# Patient Record
Sex: MALE | Race: BLACK OR AFRICAN AMERICAN | NOT HISPANIC OR LATINO | Employment: OTHER | URBAN - METROPOLITAN AREA
[De-identification: names, ages, dates, MRNs, and addresses within clinical notes are randomized per-mention and may not be internally consistent; named-entity substitution may affect disease eponyms.]

---

## 2017-04-07 ENCOUNTER — LAB VISIT (OUTPATIENT)
Dept: LAB | Facility: HOSPITAL | Age: 67
End: 2017-04-07
Attending: INTERNAL MEDICINE
Payer: COMMERCIAL

## 2017-04-07 DIAGNOSIS — D47.2 MGUS (MONOCLONAL GAMMOPATHY OF UNKNOWN SIGNIFICANCE): ICD-10-CM

## 2017-04-07 LAB
ALBUMIN SERPL BCP-MCNC: 4.1 G/DL
ALP SERPL-CCNC: 80 U/L
ALT SERPL W/O P-5'-P-CCNC: 30 U/L
ANION GAP SERPL CALC-SCNC: 7 MMOL/L
AST SERPL-CCNC: 32 U/L
BASOPHILS # BLD AUTO: 0.01 K/UL
BASOPHILS NFR BLD: 0.4 %
BILIRUB SERPL-MCNC: 0.9 MG/DL
BUN SERPL-MCNC: 17 MG/DL
CALCIUM SERPL-MCNC: 9.6 MG/DL
CHLORIDE SERPL-SCNC: 107 MMOL/L
CO2 SERPL-SCNC: 28 MMOL/L
CREAT SERPL-MCNC: 1.6 MG/DL
DIFFERENTIAL METHOD: ABNORMAL
EOSINOPHIL # BLD AUTO: 0.2 K/UL
EOSINOPHIL NFR BLD: 6.2 %
ERYTHROCYTE [DISTWIDTH] IN BLOOD BY AUTOMATED COUNT: 13.8 %
EST. GFR  (AFRICAN AMERICAN): 51.9 ML/MIN/1.73 M^2
EST. GFR  (NON AFRICAN AMERICAN): 44.9 ML/MIN/1.73 M^2
GLUCOSE SERPL-MCNC: 83 MG/DL
HCT VFR BLD AUTO: 42.6 %
HGB BLD-MCNC: 14.8 G/DL
LYMPHOCYTES # BLD AUTO: 0.8 K/UL
LYMPHOCYTES NFR BLD: 34.7 %
MCH RBC QN AUTO: 29.9 PG
MCHC RBC AUTO-ENTMCNC: 34.7 %
MCV RBC AUTO: 86 FL
MONOCYTES # BLD AUTO: 0.2 K/UL
MONOCYTES NFR BLD: 7.9 %
NEUTROPHILS # BLD AUTO: 1.2 K/UL
NEUTROPHILS NFR BLD: 50.8 %
PLATELET # BLD AUTO: 125 K/UL
PMV BLD AUTO: 10.1 FL
POTASSIUM SERPL-SCNC: 3.4 MMOL/L
PROT SERPL-MCNC: 8.2 G/DL
RBC # BLD AUTO: 4.95 M/UL
SODIUM SERPL-SCNC: 142 MMOL/L
WBC # BLD AUTO: 2.42 K/UL

## 2017-04-07 PROCEDURE — 83520 IMMUNOASSAY QUANT NOS NONAB: CPT

## 2017-04-07 PROCEDURE — 85025 COMPLETE CBC W/AUTO DIFF WBC: CPT

## 2017-04-07 PROCEDURE — 86334 IMMUNOFIX E-PHORESIS SERUM: CPT

## 2017-04-07 PROCEDURE — 86334 IMMUNOFIX E-PHORESIS SERUM: CPT | Mod: 26,,, | Performed by: PATHOLOGY

## 2017-04-07 PROCEDURE — 84165 PROTEIN E-PHORESIS SERUM: CPT

## 2017-04-07 PROCEDURE — 84165 PROTEIN E-PHORESIS SERUM: CPT | Mod: 26,,, | Performed by: PATHOLOGY

## 2017-04-07 PROCEDURE — 80053 COMPREHEN METABOLIC PANEL: CPT

## 2017-04-10 LAB
ALBUMIN SERPL ELPH-MCNC: 4.26 G/DL
ALPHA1 GLOB SERPL ELPH-MCNC: 0.3 G/DL
ALPHA2 GLOB SERPL ELPH-MCNC: 0.55 G/DL
B-GLOBULIN SERPL ELPH-MCNC: 0.85 G/DL
GAMMA GLOB SERPL ELPH-MCNC: 1.85 G/DL
INTERPRETATION SERPL IFE-IMP: NORMAL
KAPPA LC SER QL IA: 2.59 MG/DL
KAPPA LC/LAMBDA SER IA: 1.84
LAMBDA LC SER QL IA: 1.41 MG/DL
PATHOLOGIST INTERPRETATION IFE: NORMAL
PATHOLOGIST INTERPRETATION SPE: NORMAL
PROT SERPL-MCNC: 7.8 G/DL

## 2017-04-13 ENCOUNTER — OFFICE VISIT (OUTPATIENT)
Dept: HEMATOLOGY/ONCOLOGY | Facility: CLINIC | Age: 67
End: 2017-04-13
Payer: COMMERCIAL

## 2017-04-13 ENCOUNTER — OFFICE VISIT (OUTPATIENT)
Dept: UROLOGY | Facility: CLINIC | Age: 67
End: 2017-04-13
Payer: COMMERCIAL

## 2017-04-13 VITALS
TEMPERATURE: 99 F | HEIGHT: 69 IN | DIASTOLIC BLOOD PRESSURE: 67 MMHG | WEIGHT: 157.44 LBS | SYSTOLIC BLOOD PRESSURE: 130 MMHG | RESPIRATION RATE: 18 BRPM | BODY MASS INDEX: 23.32 KG/M2 | HEART RATE: 57 BPM

## 2017-04-13 VITALS
HEIGHT: 69 IN | BODY MASS INDEX: 23.6 KG/M2 | SYSTOLIC BLOOD PRESSURE: 122 MMHG | TEMPERATURE: 98 F | HEART RATE: 59 BPM | WEIGHT: 159.38 LBS | DIASTOLIC BLOOD PRESSURE: 71 MMHG

## 2017-04-13 DIAGNOSIS — D72.819 LEUKOPENIA, UNSPECIFIED TYPE: Primary | ICD-10-CM

## 2017-04-13 DIAGNOSIS — R97.20 ELEVATED PSA: Primary | ICD-10-CM

## 2017-04-13 DIAGNOSIS — D47.2 MGUS (MONOCLONAL GAMMOPATHY OF UNKNOWN SIGNIFICANCE): ICD-10-CM

## 2017-04-13 DIAGNOSIS — D69.6 THROMBOCYTOPENIA: ICD-10-CM

## 2017-04-13 LAB
BILIRUB SERPL-MCNC: ABNORMAL MG/DL
BLOOD URINE, POC: ABNORMAL
COLOR, POC UA: ABNORMAL
GLUCOSE UR QL STRIP: ABNORMAL
KETONES UR QL STRIP: ABNORMAL
LEUKOCYTE ESTERASE URINE, POC: ABNORMAL
NITRITE, POC UA: ABNORMAL
PH, POC UA: 6
PROTEIN, POC: ABNORMAL
SPECIFIC GRAVITY, POC UA: 1015
UROBILINOGEN, POC UA: ABNORMAL

## 2017-04-13 PROCEDURE — 99214 OFFICE O/P EST MOD 30 MIN: CPT | Mod: 25,S$GLB,, | Performed by: UROLOGY

## 2017-04-13 PROCEDURE — 1160F RVW MEDS BY RX/DR IN RCRD: CPT | Mod: S$GLB,,, | Performed by: INTERNAL MEDICINE

## 2017-04-13 PROCEDURE — 3078F DIAST BP <80 MM HG: CPT | Mod: S$GLB,,, | Performed by: UROLOGY

## 2017-04-13 PROCEDURE — 1160F RVW MEDS BY RX/DR IN RCRD: CPT | Mod: S$GLB,,, | Performed by: UROLOGY

## 2017-04-13 PROCEDURE — 3074F SYST BP LT 130 MM HG: CPT | Mod: S$GLB,,, | Performed by: UROLOGY

## 2017-04-13 PROCEDURE — 99999 PR PBB SHADOW E&M-EST. PATIENT-LVL III: CPT | Mod: PBBFAC,,, | Performed by: INTERNAL MEDICINE

## 2017-04-13 PROCEDURE — 99214 OFFICE O/P EST MOD 30 MIN: CPT | Mod: S$GLB,,, | Performed by: INTERNAL MEDICINE

## 2017-04-13 PROCEDURE — 99999 PR PBB SHADOW E&M-EST. PATIENT-LVL III: CPT | Mod: PBBFAC,,, | Performed by: UROLOGY

## 2017-04-13 PROCEDURE — 3078F DIAST BP <80 MM HG: CPT | Mod: S$GLB,,, | Performed by: INTERNAL MEDICINE

## 2017-04-13 PROCEDURE — 87086 URINE CULTURE/COLONY COUNT: CPT

## 2017-04-13 PROCEDURE — 81002 URINALYSIS NONAUTO W/O SCOPE: CPT | Mod: S$GLB,,, | Performed by: UROLOGY

## 2017-04-13 PROCEDURE — 3075F SYST BP GE 130 - 139MM HG: CPT | Mod: S$GLB,,, | Performed by: INTERNAL MEDICINE

## 2017-04-13 RX ORDER — SULFAMETHOXAZOLE AND TRIMETHOPRIM 800; 160 MG/1; MG/1
1 TABLET ORAL 2 TIMES DAILY
Qty: 40 TABLET | Refills: 0 | Status: SHIPPED | OUTPATIENT
Start: 2017-04-13 | End: 2017-05-03

## 2017-04-13 NOTE — PROGRESS NOTES
Subjective:       Patient ID: Irais Mcneil is a 66 y.o. male.    Chief Complaint:  OFFICE NOTE    CHIEF COMPLAINT:  Abnormal PSA.    Mr. Mcneil is a 66-year-old male who has a history of an elevated PSA.  The   patient in December 2015 underwent transrectal prostate ultrasound and biopsies   that proved to be negative for prostate cancer.  Since then, the patient has   been followed closely for his PSA every six months.  It is to be noted that a   year ago, his PSA was 6.3.  It came down in October 2016 to 5.7 and the last one   on 04/07/2017, 6.1.    The patient is free of any low urinary tract symptoms.  He refers to have   nocturia x1.  Denies dysuria.  Denies hematuria.  The flow is adequate and he   feels that he can empty the bladder satisfactorily.    FAMILY HISTORY:  Negative for prostate cancer.    PAST MEDICAL AND SURGICAL HISTORY:  He underwent a bone marrow biopsy for a low   cell count in his blood and proves to be not significant, there is no   malignancy.    Also to be noted is that the urinalysis today shows negative leukocytes,   positive nitrites, negative blood.  Suspect that he may have a urinary   infection.  We are going to do a urine culture and start the patient on Bactrim   DS and depending on the culture, we may need to see him back or not, earlier   than six months.      EOR/PN  dd: 04/13/2017 11:07:21 (CDT)  td: 04/13/2017 21:35:45 (CDT)  Doc ID   #[id number]  Job ID #[id number]    CC:        HPI  Review of Systems   Constitutional: Negative for activity change and appetite change.   HENT: Negative.    Eyes: Negative for discharge.   Respiratory: Negative for cough and shortness of breath.    Cardiovascular: Negative for chest pain and palpitations.   Gastrointestinal: Negative for abdominal distention, abdominal pain, constipation and vomiting.   Genitourinary: Negative for discharge, dysuria, flank pain, frequency, hematuria, testicular pain and urgency.   Musculoskeletal: Negative for  arthralgias.   Skin: Negative for rash.   Neurological: Negative for dizziness.   Psychiatric/Behavioral: The patient is not nervous/anxious.        Objective:      Physical Exam   Constitutional: He appears well-developed and well-nourished.   HENT:   Head: Normocephalic.   Eyes: Pupils are equal, round, and reactive to light.   Neck: Normal range of motion.   Cardiovascular: Normal rate.    Pulmonary/Chest: Effort normal.   Abdominal: Soft. He exhibits no distension and no mass. There is no tenderness.   Genitourinary: Rectum normal and penis normal. Rectal exam shows no external hemorrhoid, no mass and no tenderness. Prostate is enlarged. Prostate is not tender. Right testis shows no mass and no tenderness. Left testis shows no mass and no tenderness. No discharge found.   Musculoskeletal: Normal range of motion.   Neurological: He is alert.   Skin: Skin is warm.     Psychiatric: He has a normal mood and affect.       Assessment:       1. Elevated PSA        Plan:       Elevated PSA  -     POCT URINE DIPSTICK WITHOUT MICROSCOPE  -     Urine culture    Other orders  -     sulfamethoxazole-trimethoprim 800-160mg (BACTRIM DS) 800-160 mg Tab; Take 1 tablet by mouth 2 (two) times daily.  Dispense: 40 tablet; Refill: 0    RTC 6 mo. Repeat PSA q 6 mo.

## 2017-04-13 NOTE — PROGRESS NOTES
Subjective:       Patient ID: Irais Mcneil is a 66 y.o. male.    Chief Complaint: Follow-up      HPI    This is a 66-year-old gentleman who is here for follow-up of leukopenia, granulocytopenia and thrombocytopenia.   He is here today to follow-up on his labs.  He only uses topomax now intermittently  He remains on Ultram which she takes on an as-needed basis for arthralgias and he is tolerating Norvasc as well as Diovan for hypertension          Current Outpatient Prescriptions:     amlodipine (NORVASC) 10 MG tablet, TAKE 1 TABLET (10 MG TOTAL) BY MOUTH ONCE DAILY., Disp: 30 tablet, Rfl: 2    aspirin 81 MG chewable tablet, Take 81 mg by mouth once daily.  , Disp: , Rfl:     atorvastatin (LIPITOR) 20 MG tablet, TAKE 1 TABLET BY MOUTH AT BEDTIME, Disp: 30 tablet, Rfl: 2    folic acid (FOLVITE) 1 MG tablet, TAKE 1 TABLET BY MOUTH EVERY DAY, Disp: 90 tablet, Rfl: 0    multivitamin capsule, Take 1 capsule by mouth once daily.  , Disp: , Rfl:     sildenafil (VIAGRA) 100 MG tablet, Take 1 tablet (100 mg total) by mouth as needed for Erectile Dysfunction., Disp: 4 tablet, Rfl: 11    topiramate (TOPAMAX) 25 MG tablet, TAKE 1 TABLET BY MOUTH EVERY DAY, Disp: 90 tablet, Rfl: 2    tramadol (ULTRAM) 50 mg tablet, Take 50 mg by mouth every 6 (six) hours as needed.  , Disp: , Rfl:     valsartan (DIOVAN) 40 MG tablet, Take 40 mg by mouth once daily., Disp: , Rfl: 3  All medications and past history have been reviewed.    Review of Systems    CONSTITUTIONAL: No fevers, chills, night sweats, wt. loss, appetite changes  SKIN: no rashes or itching  ENT: No headaches, head trauma, vision changes, or eye pain  LYMPH NODES: None noticed   CV: No chest pain, palpitations.   RESP: No dyspnea on exertion, cough, wheezing, or hemoptysis  GI: No nausea, emesis, diarrhea, constipation, melena, hematochezia, pain.   : No dysuria, hematuria, urgency, or frequency   HEME: No easy bruising, bleeding problems  PSYCHIATRIC: No  "depression, anxiety, psychosis, hallucinations.  NEURO: No seizures, memory loss, dizziness or difficulty sleeping  MSK: No arthralgias or joint swelling    Objective:        /67  Pulse (!) 57  Temp 98.8 °F (37.1 °C)  Resp 18  Ht 5' 9" (1.753 m)  Wt 71.4 kg (157 lb 6.5 oz)  BMI 23.25 kg/m2  Physical Exam    Repeat pressure 150/59  Gen: NAD, A and O x3,   Psych: pleasant affect, normal thought process  Eyes: Pupils round and non dilated, EOM intact  Nose: Nares patent  OP clear, mucosa patent  Neck: suppple, no JVD, trachea midline  Chest negative  Abd: ND, + BS no ascites  Extr: No CCE, SHRAVAN, strength normal  Neuro: steady gait, CNs grossly intact  Skin: intact, no lesions noted  Rheum: No joint swelling  All labs and imaging have been reviewed.        Ref Range & Units 10d ago   3mo ago       WBC 3.90 - 12.70 K/uL 2.76 (L) 3.56 (L)    RBC 4.60 - 6.20 M/uL 4.81 4.83    Hemoglobin 14.0 - 18.0 g/dL 14.1 14.2    Hematocrit 40.0 - 54.0 % 42.7 42.1    MCV 82 - 98 fL 89 87    MCH 27.0 - 31.0 pg 29.3 29.4    MCHC 32.0 - 36.0 % 33.0 33.7    RDW 11.5 - 14.5 % 13.4 13.4    Platelets 150 - 350 K/uL 106 (L) 126 (L)    MPV 9.2 - 12.9 fL 10.1 10.3    Gran # 1.8 - 7.7 K/uL 1.2 (L) 1.7 (L)    Lymph # 1.0 - 4.8 K/uL 1.0 1.3    Mono # 0.3 - 1.0 K/uL 0.3 0.3    Eos # 0.0 - 0.5 K/uL 0.2 0.2    Baso # 0.00 - 0.20 K/uL 0.02 0.01    Gran% 38.0 - 73.0 % 44.6 48.6    Lymph% 18.0 - 48.0 % 37.7 36.2    Mono% 4.0 - 15.0 % 9.4 7.9    Eosinophil% 0.0 - 8.0 % 7.6 6.7    Basophil% 0.0 - 1.9 % 0.7 0.3    Differential Method  Automated Automated   Resulting Agency  [facility]         Ref Range & Units 10d ago   3mo ago       Sodium 136 - 145 mmol/L 146 (H) 145    Potassium 3.5 - 5.1 mmol/L 4.5 4.5    Chloride 95 - 110 mmol/L 110 109    CO2 23 - 29 mmol/L 30 (H) 31 (H)    Glucose 70 - 110 mg/dL 90 94    BUN, Bld 8 - 23 mg/dL 21 21    Creatinine 0.5 - 1.4 mg/dL 1.7 (H) 1.4    Calcium 8.7 - 10.5 mg/dL 9.6 9.8    Anion Gap 8 - 16 mmol/L 6 (L) 5 " (L)    eGFR if African American >60 mL/min/1.73 m^2 48.5 (A) >60.0    eGFR if non African American >60 mL/min/1.73 m^2 42.0 (A) 53.1 (A)        Ref Range & Units 10d ago     [place] Free Light Chains 0.33 - 1.94 mg/dL 3.13 (H)   Lambda Free Light Chains 0.57 - 2.63 mg/dL 1.33   Kappa/Lambda FLC Ratio 0.26 - 1.65 2.35 (H)           Assessment:       1. Leukopenia, unspecified type    2. Thrombocytopenia    3. MGUS (monoclonal gammopathy of unknown significance)        Plan:           Pt lives near [place]  EXplained his counts are cyclical and likely inherited or due to marrow suppression from intermittent use of medications ie topomax  Pt is not in need of transfusions or the use of GCSFs at this time  I will set him up for F/U with a hematologist in [place] for future monitoring                 The plan was discussed with the patient and all questions/concerns have been answered to the patient's satisfaction.

## 2017-04-13 NOTE — MR AVS SNAPSHOT
[facility]  [address], [address]  [place] [address]  Phone: [phone]                  Irais Mcneil   2017 1:00 PM   Office Visit    Description:  Male : 1950   Provider:  Ping Cedillo MD   Department:  [facility]           Reason for Visit     Follow-up                To Do List           Future Appointments        Provider Department Dept Phone    2017 10:45 AM Severo Milton MD [facility] - Urology [phone]    2017 1:00 PM Ping Cedillo MD [facility] [phone]      Goals (5 Years of Data)     None      [facility]     [facility] Nurse Care Line -  Assistance  Unless otherwise directed by your provider, please contact [facility], our nurse care line that is available for  assistance.     Registered nurses in the [facility] Center provide: appointment scheduling, clinical advisement, health education, and other advisory services.  Call: [phone] (toll free)               Medications           Message regarding Medications     Verify the changes and/or additions to your medication regime listed below are the same as discussed with your clinician today.  If any of these changes or additions are incorrect, please notify your healthcare provider.             Verify that the below list of medications is an accurate representation of the medications you are currently taking.  If none reported, the list may be blank. If incorrect, please contact your healthcare provider. Carry this list with you in case of emergency.           Current Medications     amlodipine (NORVASC) 10 MG tablet TAKE 1 TABLET (10 MG TOTAL) BY MOUTH ONCE DAILY.    aspirin 81 MG chewable tablet Take 81 mg by mouth once daily.      atorvastatin (LIPITOR) 20 MG tablet TAKE 1 TABLET BY MOUTH AT BEDTIME    folic acid (FOLVITE) 1 MG tablet TAKE 1 TABLET  "BY MOUTH EVERY DAY    multivitamin capsule Take 1 capsule by mouth once daily.      sildenafil (VIAGRA) 100 MG tablet Take 1 tablet (100 mg total) by mouth as needed for Erectile Dysfunction.    topiramate (TOPAMAX) 25 MG tablet TAKE 1 TABLET BY MOUTH EVERY DAY    tramadol (ULTRAM) 50 mg tablet Take 50 mg by mouth every 6 (six) hours as needed.      valsartan (DIOVAN) 40 MG tablet Take 40 mg by mouth once daily.           Clinical Reference Information           Your Vitals Were     BP Pulse Temp Resp Height Weight    130/67 57 98.8 °F (37.1 °C) 18 5' 9" (1.753 m) 71.4 kg (157 lb 6.5 oz)    BMI                23.25 kg/m2          Blood Pressure          Most Recent Value    BP  130/67      Allergies as of 4/13/2017     No Known Allergies      Immunizations Administered on Date of Encounter - 4/13/2017     None      Language Assistance Services     ATTENTION: Language assistance services are available, free of charge. Please call [phone].      ATENCIÓN: Si habla emilia, tiene a vergara disposición servicios gratuitos de asistencia lingüística. Llame al [phone].     HERNÁN Ý: N?u b?n nói Ti?ng Vi?t, có các d?ch v? h? tr? ngôn ng? mi?n phí dành cho b?n. G?i s? [phone].         [facility] complies with applicable Federal civil rights laws and does not discriminate on the basis of race, color, national origin, age, disability, or sex.        "

## 2017-04-14 LAB — BACTERIA UR CULT: NO GROWTH

## 2017-05-05 ENCOUNTER — LAB VISIT (OUTPATIENT)
Dept: LAB | Facility: HOSPITAL | Age: 67
End: 2017-05-05
Payer: COMMERCIAL

## 2017-05-05 DIAGNOSIS — G43.809: ICD-10-CM

## 2017-05-05 DIAGNOSIS — I73.9 PERIPHERAL VASCULAR DISEASE, UNSPECIFIED: ICD-10-CM

## 2017-05-05 LAB
ESTIMATED AVG GLUCOSE: 100 MG/DL
HBA1C MFR BLD HPLC: 5.1 %
MAGNESIUM SERPL-MCNC: 2.3 MG/DL
VIT B12 SERPL-MCNC: 1899 PG/ML

## 2017-05-05 PROCEDURE — 82607 VITAMIN B-12: CPT

## 2017-05-05 PROCEDURE — 83036 HEMOGLOBIN GLYCOSYLATED A1C: CPT

## 2017-05-05 PROCEDURE — 36415 COLL VENOUS BLD VENIPUNCTURE: CPT

## 2017-05-05 PROCEDURE — 83735 ASSAY OF MAGNESIUM: CPT

## 2017-09-11 ENCOUNTER — OFFICE VISIT (OUTPATIENT)
Dept: OPTOMETRY | Facility: CLINIC | Age: 67
End: 2017-09-11
Payer: COMMERCIAL

## 2017-09-11 DIAGNOSIS — H43.812 POSTERIOR VITREOUS DETACHMENT, LEFT: Primary | ICD-10-CM

## 2017-09-11 DIAGNOSIS — H33.312 RETINAL TEAR, LEFT: ICD-10-CM

## 2017-09-11 PROCEDURE — 99999 PR PBB SHADOW E&M-EST. PATIENT-LVL II: CPT | Mod: PBBFAC,,, | Performed by: OPTOMETRIST

## 2017-09-11 PROCEDURE — 92014 COMPRE OPH EXAM EST PT 1/>: CPT | Mod: S$GLB,,, | Performed by: OPTOMETRIST

## 2017-09-11 NOTE — PROGRESS NOTES
HPI     Last eye exam was 12/3/14 with Dr. Piña.  Patient states seeing floater OS moving across vision ~ 2 weeks ago. No   flashes of light OU or floaters OD. Sometimes had trouble reading words on   tv and can't read anirudh lphone after waking in the am. AT's help a little   with blurred vision.  Patient denies diplopia, headaches, and pain.    AT's prn OU    Last edited by Estelle Jackson on 9/11/2017  9:58 AM. (History)            Assessment /Plan     For exam results, see Encounter Report.    Posterior vitreous detachment, left    Retinal tear, left          1-2.  Educated pt on PVD.  Appears to have small retinal tear inferior temporal in the left eye.  Consult retina.

## 2017-09-15 ENCOUNTER — INITIAL CONSULT (OUTPATIENT)
Dept: OPHTHALMOLOGY | Facility: CLINIC | Age: 67
End: 2017-09-15
Payer: COMMERCIAL

## 2017-09-15 VITALS — DIASTOLIC BLOOD PRESSURE: 80 MMHG | SYSTOLIC BLOOD PRESSURE: 128 MMHG | HEART RATE: 64 BPM

## 2017-09-15 DIAGNOSIS — H43.12 VITREOUS HEMORRHAGE, LEFT EYE: ICD-10-CM

## 2017-09-15 DIAGNOSIS — H43.23 ASTEROID HYALOSIS, BOTH EYES: ICD-10-CM

## 2017-09-15 DIAGNOSIS — H43.812 POSTERIOR VITREOUS DETACHMENT, LEFT EYE: ICD-10-CM

## 2017-09-15 PROCEDURE — 99999 PR PBB SHADOW E&M-EST. PATIENT-LVL III: CPT | Mod: PBBFAC,,, | Performed by: OPHTHALMOLOGY

## 2017-09-15 PROCEDURE — 92014 COMPRE OPH EXAM EST PT 1/>: CPT | Mod: S$GLB,,, | Performed by: OPHTHALMOLOGY

## 2017-09-15 PROCEDURE — 92225 PR SPECIAL EYE EXAM, INITIAL: CPT | Mod: LT,S$GLB,, | Performed by: OPHTHALMOLOGY

## 2017-09-15 NOTE — PROGRESS NOTES
HPI     PVD  Appears to have small retinal tear inferior temporal in the left eye Per Dr. Wood  DLS- 09/11/2017 Dr. Wood      Pt sts has been noticing floater in OS started 3 weeks ago started large and got smaller throughout the last weeks. Would like to know why he is having this floater in OS  Denies pain just itching. No vision changes just occasional blurred va OU   when using gtts vision gets better  (-)Flashes (+)Floaters  (-)Photophobia  (-)Glare    Last edited by Nini Malave on 9/15/2017  8:32 AM. (History)        A/P    1. Hemorrhagic PVD OS  IT heme, no breaks with SD exam    RD precautions    2. AH OU    3. PCIOL OU    6 weeks Dilate OS

## 2017-09-15 NOTE — LETTER
September 15, 2017      Ashtyn Wood, OD  [address]  [facility] [address]           [facility]  [address]  [facility] [address]  Phone: [phone]  Fax: [phone]          Patient: Irais Mcneil   MR Number: [id number]   YOB: 1950   Date of Visit: 9/15/2017       Dear Dr. Ashtyn Wood:    Thank you for referring Irais Mcneil to me for evaluation. Attached you will find relevant portions of my assessment and plan of care.    If you have questions, please do not hesitate to call me. I look forward to following Irais Mcneil along with you.    Sincerely,    TIANNA Israel MD    Enclosure  CC:  No Recipients    If you would like to receive this communication electronically, please contact [email or web address] or [phone] to request more information on [facility] access.    For providers and/or their staff who would like to refer a patient to [facility], please contact us through our one-stop-shop provider referral line, [facility], at [phone].    If you feel you have received this communication in error or would no longer like to receive these types of communications, please e-mail [email or web address]

## 2017-10-31 ENCOUNTER — LAB VISIT (OUTPATIENT)
Dept: LAB | Facility: HOSPITAL | Age: 67
End: 2017-10-31
Attending: UROLOGY
Payer: COMMERCIAL

## 2017-10-31 ENCOUNTER — OFFICE VISIT (OUTPATIENT)
Dept: OPHTHALMOLOGY | Facility: CLINIC | Age: 67
End: 2017-10-31
Payer: COMMERCIAL

## 2017-10-31 DIAGNOSIS — R97.20 ELEVATED PSA: ICD-10-CM

## 2017-10-31 DIAGNOSIS — H43.12 VITREOUS HEMORRHAGE, LEFT EYE: ICD-10-CM

## 2017-10-31 DIAGNOSIS — H43.812 POSTERIOR VITREOUS DETACHMENT, LEFT EYE: Primary | ICD-10-CM

## 2017-10-31 DIAGNOSIS — H43.23 ASTEROID HYALOSIS, BOTH EYES: ICD-10-CM

## 2017-10-31 LAB — COMPLEXED PSA SERPL-MCNC: 6.1 NG/ML

## 2017-10-31 PROCEDURE — 92014 COMPRE OPH EXAM EST PT 1/>: CPT | Mod: S$GLB,,, | Performed by: OPHTHALMOLOGY

## 2017-10-31 PROCEDURE — 99999 PR PBB SHADOW E&M-EST. PATIENT-LVL III: CPT | Mod: PBBFAC,,, | Performed by: OPHTHALMOLOGY

## 2017-10-31 PROCEDURE — 36415 COLL VENOUS BLD VENIPUNCTURE: CPT

## 2017-10-31 PROCEDURE — 84153 ASSAY OF PSA TOTAL: CPT

## 2017-10-31 PROCEDURE — 92226 PR SPECIAL EYE EXAM, SUBSEQUENT: CPT | Mod: LT,S$GLB,, | Performed by: OPHTHALMOLOGY

## 2017-10-31 NOTE — PROGRESS NOTES
HPI     6 week OS dilate   DLS- 09/15/2017 Dr. Israel    Floaters/spots has improved a lot since last visit about 2 weeks ago   noticed. Blurred reading va when reading small print in am Denies pain       A/P    1. Hemorrhagic PVD OS  IT heme, no breaks with SD exam    RD precautions    2. AH OU    3. PCIOL OU      3 months

## 2017-11-09 ENCOUNTER — IMMUNIZATION (OUTPATIENT)
Dept: FAMILY MEDICINE | Facility: CLINIC | Age: 67
End: 2017-11-09
Payer: COMMERCIAL

## 2017-11-09 ENCOUNTER — LAB VISIT (OUTPATIENT)
Dept: LAB | Facility: HOSPITAL | Age: 67
End: 2017-11-09
Attending: UROLOGY
Payer: COMMERCIAL

## 2017-11-09 ENCOUNTER — OFFICE VISIT (OUTPATIENT)
Dept: UROLOGY | Facility: CLINIC | Age: 67
End: 2017-11-09
Payer: COMMERCIAL

## 2017-11-09 VITALS
SYSTOLIC BLOOD PRESSURE: 128 MMHG | HEIGHT: 69 IN | TEMPERATURE: 98 F | WEIGHT: 152 LBS | DIASTOLIC BLOOD PRESSURE: 69 MMHG | HEART RATE: 59 BPM | BODY MASS INDEX: 22.51 KG/M2

## 2017-11-09 DIAGNOSIS — R97.20 ABNORMAL PSA: Primary | ICD-10-CM

## 2017-11-09 DIAGNOSIS — R97.20 ELEVATED PSA: ICD-10-CM

## 2017-11-09 DIAGNOSIS — R97.20 ABNORMAL PSA: ICD-10-CM

## 2017-11-09 LAB
ALBUMIN SERPL BCP-MCNC: 3.7 G/DL
ANION GAP SERPL CALC-SCNC: 4 MMOL/L
BILIRUB SERPL-MCNC: NORMAL MG/DL
BLOOD URINE, POC: NORMAL
BUN SERPL-MCNC: 20 MG/DL
CALCIUM SERPL-MCNC: 9.7 MG/DL
CHLORIDE SERPL-SCNC: 106 MMOL/L
CO2 SERPL-SCNC: 31 MMOL/L
COLOR, POC UA: NORMAL
CREAT SERPL-MCNC: 1.6 MG/DL
EST. GFR  (AFRICAN AMERICAN): 51.9 ML/MIN/1.73 M^2
EST. GFR  (NON AFRICAN AMERICAN): 44.9 ML/MIN/1.73 M^2
GLUCOSE SERPL-MCNC: 48 MG/DL
GLUCOSE UR QL STRIP: NORMAL
KETONES UR QL STRIP: NORMAL
LEUKOCYTE ESTERASE URINE, POC: NORMAL
NITRITE, POC UA: NORMAL
PH, POC UA: 6
PHOSPHATE SERPL-MCNC: 2.8 MG/DL
POTASSIUM SERPL-SCNC: 4.8 MMOL/L
PROTEIN, POC: NORMAL
SODIUM SERPL-SCNC: 141 MMOL/L
SPECIFIC GRAVITY, POC UA: 1.01
UROBILINOGEN, POC UA: NORMAL

## 2017-11-09 PROCEDURE — 36415 COLL VENOUS BLD VENIPUNCTURE: CPT | Mod: PO

## 2017-11-09 PROCEDURE — 99214 OFFICE O/P EST MOD 30 MIN: CPT | Mod: 25,S$GLB,, | Performed by: UROLOGY

## 2017-11-09 PROCEDURE — 81002 URINALYSIS NONAUTO W/O SCOPE: CPT | Mod: S$GLB,,, | Performed by: UROLOGY

## 2017-11-09 PROCEDURE — 90471 IMMUNIZATION ADMIN: CPT | Mod: S$GLB,,, | Performed by: INTERNAL MEDICINE

## 2017-11-09 PROCEDURE — 99999 PR PBB SHADOW E&M-EST. PATIENT-LVL III: CPT | Mod: PBBFAC,,, | Performed by: UROLOGY

## 2017-11-09 PROCEDURE — 80069 RENAL FUNCTION PANEL: CPT

## 2017-11-09 PROCEDURE — 90686 IIV4 VACC NO PRSV 0.5 ML IM: CPT | Mod: S$GLB,,, | Performed by: INTERNAL MEDICINE

## 2017-11-10 ENCOUNTER — TELEPHONE (OUTPATIENT)
Dept: UROLOGY | Facility: CLINIC | Age: 67
End: 2017-11-10

## 2017-11-10 DIAGNOSIS — R97.20 ELEVATED PSA: Primary | ICD-10-CM

## 2017-11-10 NOTE — TELEPHONE ENCOUNTER
Please review incidental finding of critical low glucose during renal testing.  Patient is aware.

## 2017-11-10 NOTE — PROGRESS NOTES
Subjective:       Patient ID: Irais Mcneil is a 66 y.o. male.    Chief Complaint:   OFFICE NOTE    CHIEF COMPLAINT:  Elevated PSA.    Mr. Mcneil is a 66-year-old male who has a history of elevated PSA.  His PSA in   April 2017 was 6.1 and the last one on 10/31/2017 was 6.12.  It is to be noted   that in December 2015, he underwent transrectal prostate ultrasound and biopsies   that failed to show evidence of cancer.  His prostate volume at that time was   41.3 mL.  The patient is free of any lower urinary tract symptoms.  Denies   dysuria, nocturia, hematuria, urinary frequency, and decrease in the urine flow.        The family history is negative for prostate cancer.    The patient has mild renal insufficiency with creatinine of 1.6.  The patient   would like me to order a renal panel in order to determine if that is worsening   or is stable.  He used to have severe hypertension, now is under good control.    I have discussed with the patient the possibility of sending the tissue from two   years ago to a genetic study on that to see if any methylation signs in the   genetic material of the cell and he agreed to proceed with it.    The past medical and surgical history have not changed since the last visit, all   this was reviewed including medications and allergies.    The urinalysis is clear.      EOR/HN  dd: 11/10/2017 13:06:32 (CST)  td: 11/11/2017 09:56:56 (CST)  Doc ID   #[id number]  Job ID #[id number]    CC:       HPI  Review of Systems   Constitutional: Negative for activity change and appetite change.   HENT: Negative.    Eyes: Negative for discharge.   Respiratory: Negative for cough and shortness of breath.    Cardiovascular: Negative for chest pain and palpitations.   Gastrointestinal: Negative for abdominal distention, abdominal pain, constipation and vomiting.   Genitourinary: Negative for discharge, dysuria, flank pain, frequency, hematuria, testicular pain and urgency.   Musculoskeletal: Negative  for arthralgias.   Skin: Negative for rash.   Neurological: Negative for dizziness.   Psychiatric/Behavioral: The patient is not nervous/anxious.        Objective:      Physical Exam   Constitutional: He appears well-developed and well-nourished.   HENT:   Head: Normocephalic.   Eyes: Pupils are equal, round, and reactive to light.   Neck: Normal range of motion.   Cardiovascular: Normal rate.    Pulmonary/Chest: Effort normal.   Abdominal: Soft. He exhibits no distension and no mass. There is no tenderness.   Genitourinary: Rectum normal and penis normal. Rectal exam shows no external hemorrhoid, no mass and no tenderness. Prostate is enlarged. Prostate is not tender. Right testis shows no mass and no tenderness. Left testis shows no mass and no tenderness. No discharge found.   Musculoskeletal: Normal range of motion.   Neurological: He is alert.   Skin: Skin is warm.     Psychiatric: He has a normal mood and affect.       Assessment:       1. Abnormal PSA    2. Elevated PSA    3. Kidney disease due to severe high blood pressure, stage 1 through stage 4 or unspecified chronic kidney disease        Plan:       Abnormal PSA  -     POCT URINE DIPSTICK WITHOUT MICROSCOPE  -     RENAL FUNCTION PANEL; Future; Expected date: 11/23/2017    Elevated PSA    Kidney disease due to severe high blood pressure, stage 1 through stage 4 or unspecified chronic kidney disease    RTC 6 mo with PSA.

## 2017-11-11 ENCOUNTER — LAB VISIT (OUTPATIENT)
Dept: LAB | Facility: HOSPITAL | Age: 67
End: 2017-11-11
Attending: INTERNAL MEDICINE
Payer: COMMERCIAL

## 2017-11-11 DIAGNOSIS — G43.009 MIGRAINE WITHOUT AURA AND WITHOUT STATUS MIGRAINOSUS, NOT INTRACTABLE: ICD-10-CM

## 2017-11-11 DIAGNOSIS — I73.9 INTERMITTENT CLAUDICATION: ICD-10-CM

## 2017-11-11 LAB
ALBUMIN SERPL BCP-MCNC: 3.9 G/DL
ALP SERPL-CCNC: 80 U/L
ALT SERPL W/O P-5'-P-CCNC: 21 U/L
ANION GAP SERPL CALC-SCNC: 7 MMOL/L
AST SERPL-CCNC: 28 U/L
BILIRUB SERPL-MCNC: 0.6 MG/DL
BUN SERPL-MCNC: 19 MG/DL
CALCIUM SERPL-MCNC: 9.9 MG/DL
CHLORIDE SERPL-SCNC: 109 MMOL/L
CO2 SERPL-SCNC: 27 MMOL/L
CREAT SERPL-MCNC: 1.5 MG/DL
EST. GFR  (AFRICAN AMERICAN): 56 ML/MIN/1.73 M^2
EST. GFR  (NON AFRICAN AMERICAN): 49 ML/MIN/1.73 M^2
GLUCOSE SERPL-MCNC: 88 MG/DL
POTASSIUM SERPL-SCNC: 4.3 MMOL/L
PROT SERPL-MCNC: 8 G/DL
SODIUM SERPL-SCNC: 143 MMOL/L

## 2017-11-11 PROCEDURE — 80053 COMPREHEN METABOLIC PANEL: CPT

## 2017-11-11 PROCEDURE — 36415 COLL VENOUS BLD VENIPUNCTURE: CPT

## 2017-11-28 ENCOUNTER — LAB VISIT (OUTPATIENT)
Dept: LAB | Facility: HOSPITAL | Age: 67
End: 2017-11-28
Attending: UROLOGY
Payer: COMMERCIAL

## 2017-11-28 DIAGNOSIS — R97.20 ELEVATED PSA: ICD-10-CM

## 2017-11-28 PROCEDURE — 36415 COLL VENOUS BLD VENIPUNCTURE: CPT

## 2017-12-28 ENCOUNTER — OFFICE VISIT (OUTPATIENT)
Dept: UROLOGY | Facility: CLINIC | Age: 67
End: 2017-12-28
Payer: COMMERCIAL

## 2017-12-28 VITALS
BODY MASS INDEX: 22.49 KG/M2 | DIASTOLIC BLOOD PRESSURE: 84 MMHG | RESPIRATION RATE: 18 BRPM | HEART RATE: 66 BPM | SYSTOLIC BLOOD PRESSURE: 137 MMHG | HEIGHT: 69 IN | WEIGHT: 151.88 LBS

## 2017-12-28 DIAGNOSIS — R97.20 ELEVATED PSA: Primary | ICD-10-CM

## 2017-12-28 PROCEDURE — 99999 PR PBB SHADOW E&M-EST. PATIENT-LVL III: CPT | Mod: PBBFAC,,, | Performed by: UROLOGY

## 2017-12-28 PROCEDURE — 99213 OFFICE O/P EST LOW 20 MIN: CPT | Mod: S$GLB,,, | Performed by: UROLOGY

## 2017-12-28 PROCEDURE — 81002 URINALYSIS NONAUTO W/O SCOPE: CPT | Mod: S$GLB,,, | Performed by: UROLOGY

## 2017-12-28 NOTE — PROGRESS NOTES
OFFICE NOTE    CHIEF COMPLAINT:  Abnormal PSA.    Mr. Mcneil is a 67-year-old male who has a long history of elevated PSA.  His   PSA in April 2017 was 6.1 and in October 2017 is 6.1.  The patient in December 2015 underwent transrectal prostate ultrasound and biopsies that failed to show   evidence of cancer.  The prostate volume at that time was 41 cubic cm.  We did   send that tissue for the ConfirmMDx methylation test that shows that the patient   has the likelihood of prostate cancer on repeat biopsy of approximately 60%.    Today, he was present with his wife and I did explain to them all the details of   this new test and also the significant chance of him developing prostate   cancer.  After a lengthy discussion, they would like to proceed to wait a little   bit longer since his last two PSAs have not changed and repeat the PSA in April   six months from the previous one.  At the point, they may agree to proceed with   transrectal prostate ultrasound and repeat biopsy.  I feel that since there is   no significant increase in his PSA, this is perfectly acceptable.  The patient   and the wife were answered all the questions and they left the office in   satisfactory condition.  I did spend approximately 40 to 45 minutes counseling   this couple.  All the time was spent in counseling.      EOR/HN  dd: 12/28/2017 12:30:26 (CST)  td: 12/29/2017 15:45:21 (CST)  Doc ID   #[id number]  Job ID #[id number]    CC:

## 2017-12-29 LAB
BILIRUB SERPL-MCNC: NORMAL MG/DL
BLOOD URINE, POC: NORMAL
COLOR, POC UA: NORMAL
GLUCOSE UR QL STRIP: NORMAL
KETONES UR QL STRIP: NORMAL
LEUKOCYTE ESTERASE URINE, POC: NORMAL
NITRITE, POC UA: NORMAL
PH, POC UA: 7
PROTEIN, POC: NORMAL
SPECIFIC GRAVITY, POC UA: 1.01
UROBILINOGEN, POC UA: NORMAL

## 2018-01-02 ENCOUNTER — OFFICE VISIT (OUTPATIENT)
Dept: OPHTHALMOLOGY | Facility: CLINIC | Age: 68
End: 2018-01-02
Payer: COMMERCIAL

## 2018-01-02 VITALS — SYSTOLIC BLOOD PRESSURE: 134 MMHG | HEART RATE: 56 BPM | DIASTOLIC BLOOD PRESSURE: 81 MMHG

## 2018-01-02 DIAGNOSIS — H43.12 VITREOUS HEMORRHAGE, LEFT EYE: Primary | ICD-10-CM

## 2018-01-02 DIAGNOSIS — H34.8321 BRANCH RETINAL VEIN OCCLUSION OF LEFT EYE WITH RETINAL NEOVASCULARIZATION: ICD-10-CM

## 2018-01-02 PROCEDURE — 92235 FLUORESCEIN ANGRPH MLTIFRAME: CPT | Mod: S$GLB,,, | Performed by: OPHTHALMOLOGY

## 2018-01-02 PROCEDURE — 99999 PR PBB SHADOW E&M-EST. PATIENT-LVL III: CPT | Mod: PBBFAC,,, | Performed by: OPHTHALMOLOGY

## 2018-01-02 PROCEDURE — 92226 PR SPECIAL EYE EXAM, SUBSEQUENT: CPT | Mod: LT,S$GLB,, | Performed by: OPHTHALMOLOGY

## 2018-01-02 PROCEDURE — 92014 COMPRE OPH EXAM EST PT 1/>: CPT | Mod: S$GLB,,, | Performed by: OPHTHALMOLOGY

## 2018-01-02 NOTE — PROGRESS NOTES
HPI     Eye Problem    Additional comments: 3 mos check      [place] FA - No NV OS      A/P    1. Hemorrhagic PVD OS  IT heme, no breaks with SD exam    Sclerotic vessel OS IT - no active NV on FA    RD precautions    2. AH OU    3. PCIOL OU      6 months

## 2018-02-26 ENCOUNTER — TELEPHONE (OUTPATIENT)
Dept: CARDIOLOGY | Facility: CLINIC | Age: 68
End: 2018-02-26

## 2018-02-26 DIAGNOSIS — R00.1 BRADYCARDIA: Primary | ICD-10-CM

## 2018-02-26 NOTE — TELEPHONE ENCOUNTER
----- Message from Katy De Luna MA sent at 2/26/2018 10:22 AM CST -----  Contact: self  Pt.called to say he went to an urgent care facility yesterday 2/25 (not an [facility] facility).He went to  It because he was dizzy,.they did an ekg, heart rate was 53.Was told to call his cardiologist. Please call [phone]. pt. Last visit 9-16-15. He has paperwork from Urgent care that he will fax to you.

## 2018-02-26 NOTE — TELEPHONE ENCOUNTER
Pt said that he went to a Non[facility] urgent care facility to be seen for dizziness and they did an EKG which showed that his ventricular heart rate was 53 and they advised him to f/u with his Cardiologist.LOV was 9/16/15 .He agreed to schedule an appt with Shira Sam NP along w/Maribeth Leblanc.Pt said that he does not have the actual EKG just the narrative.Scheduled the EKG b4.

## 2018-02-27 ENCOUNTER — OFFICE VISIT (OUTPATIENT)
Dept: CARDIOLOGY | Facility: CLINIC | Age: 68
End: 2018-02-27
Payer: COMMERCIAL

## 2018-02-27 ENCOUNTER — HOSPITAL ENCOUNTER (OUTPATIENT)
Dept: CARDIOLOGY | Facility: CLINIC | Age: 68
Discharge: HOME | End: 2018-02-27
Payer: COMMERCIAL

## 2018-02-27 VITALS
HEART RATE: 60 BPM | BODY MASS INDEX: 23.67 KG/M2 | HEIGHT: 69 IN | OXYGEN SATURATION: 100 % | SYSTOLIC BLOOD PRESSURE: 138 MMHG | WEIGHT: 159.81 LBS | DIASTOLIC BLOOD PRESSURE: 73 MMHG

## 2018-02-27 DIAGNOSIS — R00.1 BRADYCARDIA: Primary | ICD-10-CM

## 2018-02-27 DIAGNOSIS — I10 ESSENTIAL HYPERTENSION: ICD-10-CM

## 2018-02-27 DIAGNOSIS — R42 LIGHT HEADEDNESS: ICD-10-CM

## 2018-02-27 DIAGNOSIS — R00.1 BRADYCARDIA: ICD-10-CM

## 2018-02-27 DIAGNOSIS — E78.5 HYPERLIPIDEMIA, UNSPECIFIED HYPERLIPIDEMIA TYPE: ICD-10-CM

## 2018-02-27 PROCEDURE — 3008F BODY MASS INDEX DOCD: CPT | Mod: S$GLB,,, | Performed by: NURSE PRACTITIONER

## 2018-02-27 PROCEDURE — 99214 OFFICE O/P EST MOD 30 MIN: CPT | Mod: S$GLB,,, | Performed by: NURSE PRACTITIONER

## 2018-02-27 PROCEDURE — 93000 ELECTROCARDIOGRAM COMPLETE: CPT | Mod: S$GLB,,, | Performed by: INTERNAL MEDICINE

## 2018-02-27 PROCEDURE — 99999 PR PBB SHADOW E&M-EST. PATIENT-LVL IV: CPT | Mod: PBBFAC,,, | Performed by: NURSE PRACTITIONER

## 2018-02-27 RX ORDER — TRAMADOL HYDROCHLORIDE 50 MG/1
TABLET ORAL
Refills: 1 | COMMUNITY
Start: 2018-02-09 | End: 2018-04-10 | Stop reason: SDUPTHER

## 2018-02-27 NOTE — PROGRESS NOTES
Mr. Mcneil is a patient of Dr. Stahl and was last seen in [facility] 9/6/2015.      Subjective:   Patient ID:  Irais Mcneil is a 67 y.o. male who presents for follow-up of Dizziness (Lightheaded, Urgent Care fu 02/25/18)  .   HPI:    Mr. Mcneil is a 68yo male with HTN, HLD and CKD here for follow up. He presented to urgent care on 2/25 with complaints of light-headedness. He says that he was at the breakfast table when he felt very light-headed and faint, although he denies LOC. He went to bed but the light-headedness remained for several hours, so he went to urgent care.  At urgent care he was also diagnosed with an ear infection and received a steroid infection and is currently taking augmentin. Because his EKG showed HR of 53, he was told to follow up with cardiology. Today he says that his light-headedness has resolved with no repeat episodes. Mr. Mcneil denies chest pain with exertion or at rest, palpitations, SOB, DANG, leg edema, claudication, PND, or orthopnea.     He is currently taking ASA 81mg and atorvastatin 20mg for CAD management. LDL 89 on 4/18/2016. He is also taking valsartan 40mg. BPs are in the 130s per clinic measures. Creatinine is 1.5 (which is baseline). K is 4.3. Hepatic transaminases are within normal limits. HA1C is 5.1. Weight is unchanged since last clinic visit.     Recent Cardiac Tests:    EKG (8/18/2014):  Marked sinus bradycardia, HR 47  Borderline Abnormal ECG  When compared with ECG of 27-SEP-2012 10:37,  No significant change was found    Current Outpatient Prescriptions   Medication Sig    amLODIPine (NORVASC) 10 MG tablet TAKE 1 TABLET (10 MG TOTAL) BY MOUTH ONCE DAILY.    aspirin (ECOTRIN) 81 MG EC tablet Take by mouth.    atorvastatin (LIPITOR) 20 MG tablet TAKE 1 TABLET BY MOUTH EVERY EVENING.    folic acid (FOLVITE) 1 MG tablet TAKE 1 TABLET BY MOUTH EVERY DAY    topiramate (TOPAMAX) 25 MG tablet TAKE 1 TABLET (25 MG TOTAL) BY MOUTH ONCE DAILY.     "traMADol (ULTRAM) 50 mg tablet TAKE 1-2 TABLETS EVERY 6-8 HOURS AS NEEDED FOR PAIN    valsartan (DIOVAN) 40 MG tablet Take 40 mg by mouth once daily.     No current facility-administered medications for this visit.        Review of Systems   Constitution: Negative for malaise/fatigue.   Eyes: Negative for blurred vision.   Cardiovascular: Positive for near-syncope. Negative for chest pain, claudication, dyspnea on exertion, irregular heartbeat, leg swelling, orthopnea, palpitations, paroxysmal nocturnal dyspnea and syncope.   Respiratory: Negative for shortness of breath.    Hematologic/Lymphatic: Negative for bleeding problem.   Skin: Negative for rash.   Musculoskeletal: Negative for myalgias.   Gastrointestinal: Negative for abdominal pain, constipation, diarrhea and nausea.   Genitourinary: Negative for hematuria.   Neurological: Positive for light-headedness. Negative for headaches, loss of balance and numbness.   Psychiatric/Behavioral: Negative for altered mental status.   Allergic/Immunologic: Negative for persistent infections.     Objective:     Right Arm BP - Sittin/74 (18 1043)  Left Arm BP - Sittin/73 (18 1043)    /73 (BP Location: Left arm, Patient Position: Sitting, BP Method: X-Large (Automatic))   Pulse 60   Ht 5' 9" (1.753 m)   Wt 72.5 kg (159 lb 13.3 oz)   SpO2 100%   BMI 23.60 kg/m²     Physical Exam   Constitutional: He is oriented to person, place, and time. He appears well-developed and well-nourished.   HENT:   Head: Normocephalic.   Nose: Nose normal.   Eyes: Pupils are equal, round, and reactive to light.   Neck: No JVD present. Carotid bruit is not present.   Cardiovascular: Normal rate, regular rhythm, S1 normal and S2 normal.  Exam reveals no gallop.    No murmur heard.  Pulses:       Carotid pulses are 2+ on the right side, and 2+ on the left side.       Radial pulses are 2+ on the right side, and 2+ on the left side.        Dorsalis pedis pulses are " 2+ on the right side, and 2+ on the left side.   Pulmonary/Chest: Breath sounds normal. No respiratory distress.   Abdominal: Soft. Bowel sounds are normal. He exhibits no distension. There is no tenderness.   Musculoskeletal: Normal range of motion. He exhibits no edema.   Neurological: He is alert and oriented to person, place, and time.   Skin: Skin is warm and dry. No erythema.   Psychiatric: He has a normal mood and affect. His speech is normal and behavior is normal.   Nursing note and vitals reviewed.    Lab Results   Component Value Date     11/11/2017    K 4.3 11/11/2017    MG 2.3 05/05/2017     11/11/2017    CO2 27 11/11/2017    BUN 19 11/11/2017    CREATININE 1.5 (H) 11/11/2017    GLU 88 11/11/2017    HGBA1C 5.1 05/05/2017    AST 28 11/11/2017    ALT 21 11/11/2017    ALBUMIN 3.9 11/11/2017    PROT 8.0 11/11/2017    BILITOT 0.6 11/11/2017    WBC 2.42 (L) 04/07/2017    HGB 14.8 04/07/2017    HCT 42.6 04/07/2017    MCV 86 04/07/2017     (L) 04/07/2017    TSH 2.1 08/21/2007    CHOL 159 04/18/2016    HDL 63 04/18/2016    LDLCALC 89.2 04/18/2016    LDLCALC 76 12/02/2013    TRIG 34 04/18/2016         Recent Labs  Lab 10/03/16  0850   INR 1.0        Test(s) Reviewed  I have reviewed the following in detail:  [] Stress test   [] Angiography   [] Echocardiogram   [x] Labs   [x] Other:  EKG       Assessment:         1. Bradycardia. EKG today shows sinus bradycardia, which is chronic for him and not likely the cause for his light-headedness. EKGs since 2012 show HR in the low 50s.      2. Essential hypertension. Controlled per clinic measures. Continue current medications.      3. Hyperlipidemia, unspecified hyperlipidemia type. LDL 89 on atorvastatin 20mg. Patient has lipid panel scheduled for this spring with PCP.     4. Light headedness. Likely secondary to ear infection. Patient treated with steroid injection and is currently on augmentin therapy.     Plan:     Irais was seen today for  dizziness.    Diagnoses and all orders for this visit:    Bradycardia    Essential hypertension  -     Lipid panel; Future; Expected date: 02/27/2019  -     Comprehensive metabolic panel; Future; Expected date: 02/27/2019    Hyperlipidemia, unspecified hyperlipidemia type  -     Lipid panel; Future; Expected date: 02/27/2019  -     Comprehensive metabolic panel; Future; Expected date: 02/27/2019    Light headedness      Continue current medications. Can take zyrtec D or claritin D prior to your plane flight.   Continue exercise regimen. Patient has been encouraged to exercise a minimum of 30 minutes daily, 5 times per week.   Patient advised to consume a low salt, heart healthy diet. Mediterranean diet recommended.  Return to clinic in one year after bloodwork.     Follow-up in about 1 year (around 2/27/2019).    ------------------------------------------------------------------    BALJEET Riggs, NP-C  Consult Cardiology

## 2018-03-27 ENCOUNTER — HOSPITAL ENCOUNTER (OUTPATIENT)
Dept: RADIOLOGY | Facility: OTHER | Age: 68
Discharge: HOME | End: 2018-03-27
Attending: PSYCHIATRY & NEUROLOGY
Payer: COMMERCIAL

## 2018-03-27 DIAGNOSIS — M54.16 LUMBAR RADICULOPATHY, CHRONIC: ICD-10-CM

## 2018-03-27 PROCEDURE — 72148 MRI LUMBAR SPINE W/O DYE: CPT | Mod: 26,,, | Performed by: RADIOLOGY

## 2018-03-27 PROCEDURE — 72148 MRI LUMBAR SPINE W/O DYE: CPT | Mod: TC

## 2018-04-02 ENCOUNTER — LAB VISIT (OUTPATIENT)
Dept: LAB | Facility: HOSPITAL | Age: 68
End: 2018-04-02
Attending: UROLOGY
Payer: COMMERCIAL

## 2018-04-02 DIAGNOSIS — R97.20 ELEVATED PSA: ICD-10-CM

## 2018-04-02 LAB
PROSTATE SPECIFIC ANTIGEN, TOTAL: 7.4 NG/ML
PSA FREE MFR SERPL: 23.78 %
PSA FREE SERPL-MCNC: 1.76 NG/ML

## 2018-04-02 PROCEDURE — 84154 ASSAY OF PSA FREE: CPT

## 2018-04-04 ENCOUNTER — PATIENT MESSAGE (OUTPATIENT)
Dept: UROLOGY | Facility: CLINIC | Age: 68
End: 2018-04-04

## 2018-04-05 DIAGNOSIS — R97.20 ELEVATED PSA: Primary | ICD-10-CM

## 2018-04-09 ENCOUNTER — TELEPHONE (OUTPATIENT)
Dept: SPINE | Facility: CLINIC | Age: 68
End: 2018-04-09

## 2018-04-09 DIAGNOSIS — M54.50 LUMBAR PAIN: Primary | ICD-10-CM

## 2018-04-09 NOTE — PROGRESS NOTES
Subjective:      Patient ID: Irais Mcneil is a 67 y.o. male.    Chief Complaint: Low-back Pain      HPI  ([facility])    History of migraines, CKD stage 2, hyperlipidemia, HTN.     History of spinal surgery in 2000- had fusion at L4-L5 (Jos[facility] Miguel). He had postop infection, he had a washout done and he was on IV antibiotics. His pain did slowly improve with time.     Pain has been worse in the last year. He complains of constant LBP with bilateral anterior thigh pain and posterior pain behind the knees. LBP > leg pain. Right leg pain > left leg pain. Pain is worse with sitting and driving. He has some pain when he first stands up, but does okay as he continues to walk. He rates his pain as a 4 on a scale of 1-10. Pain is aching and sharp. No numbness, tingling, or weakness in his legs. He is on prn ultram with some improvement.     No recent PT for his back. No ESIs since surgery. Lumbar surgery as above. He takes prn OTC tylenol with no improvement of his back.     Patient denies fevers, chills, night sweats, nausea, vomiting, and weight loss. Patient also denies bowel/bladder dysfunction, sexual dysfunction, and any saddle anesthesia.       Review of Systems   Constitution: Positive for weight loss. Negative for fever, weakness, malaise/fatigue, night sweats and weight gain.   HENT: Positive for hearing loss. Negative for nosebleeds and odynophagia.    Eyes: Positive for blurred vision. Negative for double vision.   Cardiovascular: Negative for chest pain, irregular heartbeat and palpitations.   Respiratory: Negative for cough, hemoptysis, shortness of breath and wheezing.    Endocrine: Negative for cold intolerance and polydipsia.   Hematologic/Lymphatic: Does not bruise/bleed easily.   Skin: Negative for dry skin, poor wound healing, rash and suspicious lesions.   Musculoskeletal: Positive for back pain and muscle cramps.        See HPI for pertinent positives.   Gastrointestinal: Negative for bloating, abdominal  pain, constipation, diarrhea, hematochezia, melena, nausea and vomiting.   Genitourinary: Negative for bladder incontinence, dysuria, hematuria, hesitancy and incomplete emptying.        Positive for any type sexual dysfunction.    Neurological: Positive for headaches and paresthesias. Negative for disturbances in coordination, dizziness, focal weakness, loss of balance, numbness and seizures.   Psychiatric/Behavioral: Negative for depression and hallucinations. The patient is not nervous/anxious.            Objective:        General: Irais is well-developed, well-nourished, appears stated age, in no acute distress, alert and oriented to time, place and person.     General    Vitals reviewed.  Constitutional: He is oriented to person, place, and time. He appears well-developed and well-nourished.   Pulmonary/Chest: Effort normal.   Abdominal: He exhibits no distension.   Neurological: He is alert and oriented to person, place, and time.   Psychiatric: He has a normal mood and affect. His behavior is normal. Judgment and thought content normal.           Gait: normal, tandem walking is normal and he is able to heel/toe stand.     On exam of the lumbar spine, Inspection of back is normal, well healed lumbar incision. Mild central lower lumbar tenderness.     Skin in lumbar region is warm to the touch without visible rashes.     muscle tone normal without spasm, limited range of motion with pain  Pain in flexion. and Pain in extension.    Strength testing of the bilateral LEs shows  Right hip abduction:  +5/5  Left hip abduction:  +5/5  Right hip flexion:  +5/5   Left hip flexion:  +5/5  Right hip extensors:  +5/5  Left hip extensors:  +5/5  Right quadriceps:  +5/5  Left quadriceps:  +5/5  Right hamstring:  +5/5  Left hamstring:  +5/5  Right dorsiflexion:  +5/5  Left dorsiflexion:  +5/5  Right plantar flexion:  +5/5  Left plantar flexion:  +5/5   Right EHL:  +5/5   Left EHL:  +5/5    negative clonus of bilateral LEs.      negative straight leg raise on bilateral LEs.     DTRs:  Right patellar:  +2     Left patellar:  +2  Right achilles:  +2   Left achilles:  +2    Sensation is grossly intact in L2, L3, L4, L5, and S1 distribution.    Right hip has no pain with IR/ER. Left hip has no pain with IR/ER.       On exam of bilateral UEs, patient has full painfree ROM with no signs of clubbing, laxity, cyanosis, edema, instability, weakness, or tenderness.       XRAY INTERPRETATION:  X-rays of lumbar spine (AP/lat/flex/ext) dated 4/10/18 are personally reviewed and show right sided lumbar curve, uninstrumented fusion L4-L5, retrolisthesis L3-L4, diffuse lumbar spondylosis/DDD.    MRI of lumbar spine dated 3/27/18 is personally reviewed and shows mild central and left foraminal stenosis L1-L2. Mild central stenosis L2-L3 with mild left/moderate right foraminal stenosis. Moderate central stenosis L3-L4 with mild left/moderate right foraminal stenosis. Fusion L4-L5. Mild central stenosis L5-S1 with mild bilateral foraminal stenosis.     Above imaging reviewed with Dr. Diez.         Assessment:       1. History of lumbar fusion    2. Spondylosis without myelopathy    3. DDD (degenerative disc disease), lumbosacral    4. Spinal stenosis of lumbar region without neurogenic claudication    5. Thoracic and lumbosacral neuritis    6. Acquired spondylolisthesis    7. Bilateral low back pain with bilateral sciatica, unspecified chronicity           Plan:       Orders Placed This Encounter    Procedure Order to [facility]    X-Ray Scoliosis Complete    gabapentin (NEURONTIN) 300 MG capsule    cyclobenzaprine (FLEXERIL) 10 MG tablet       History of spinal surgery in 2000- had fusion at L4-L5 (Jos[facility] Miguel). He had postop infection, he had a washout done and he was on IV antibiotics. Pain improved until last year. Now with constant LBP with bilateral anterior thigh pain and posterior pain behind the knees. LBP > leg pain. Right leg pain  > left leg pain. Fusion looks good at L4-L5, he has adjacent segment disease at L3-L4 with moderate stenosis and mild/left moderate right foraminal stenosis. Also with mild central stenosis and varying foraminal stenosis L1-L2, L2-L3, and L5-S1. Pain generator is likely L3-L4. Above lumbar XRs/MRI reviewed with patient. Spoke to patient's wife on speaker phone as well during appointment.     Dr. Diez was in to discuss treatment options with patient and following plan made:     - Set up for bilateral L3-L4 TF LAKEISHA with pain management. May try facet injections as well.   - Full length scoliosis XRs on his way out. Will review at his follow up.   - Trial if flexeril q hs prn. Reviewed dosing and side effects.   - Trial of neurontin 300mg g hs to increase to tid as tolerated. Reviewed dosing and side effects.   - He will f/u on Rg.     Follow-up: Follow-up in about 6 weeks (around 5/22/2018). If there are any questions prior to this, the patient was instructed to contact the office.

## 2018-04-10 ENCOUNTER — HOSPITAL ENCOUNTER (OUTPATIENT)
Dept: RADIOLOGY | Facility: OTHER | Age: 68
Discharge: HOME | End: 2018-04-10
Attending: PHYSICIAN ASSISTANT
Payer: COMMERCIAL

## 2018-04-10 ENCOUNTER — TELEPHONE (OUTPATIENT)
Dept: ADMINISTRATIVE | Facility: OTHER | Age: 68
End: 2018-04-10

## 2018-04-10 ENCOUNTER — OFFICE VISIT (OUTPATIENT)
Dept: SPINE | Facility: CLINIC | Age: 68
End: 2018-04-10
Payer: COMMERCIAL

## 2018-04-10 VITALS
DIASTOLIC BLOOD PRESSURE: 66 MMHG | BODY MASS INDEX: 23.55 KG/M2 | HEIGHT: 69 IN | HEART RATE: 57 BPM | SYSTOLIC BLOOD PRESSURE: 141 MMHG | WEIGHT: 159 LBS

## 2018-04-10 DIAGNOSIS — M51.37 DDD (DEGENERATIVE DISC DISEASE), LUMBOSACRAL: ICD-10-CM

## 2018-04-10 DIAGNOSIS — M54.42 BILATERAL LOW BACK PAIN WITH BILATERAL SCIATICA, UNSPECIFIED CHRONICITY: ICD-10-CM

## 2018-04-10 DIAGNOSIS — M43.10 ACQUIRED SPONDYLOLISTHESIS: ICD-10-CM

## 2018-04-10 DIAGNOSIS — Z98.1 HISTORY OF LUMBAR FUSION: ICD-10-CM

## 2018-04-10 DIAGNOSIS — M54.50 LUMBAR PAIN: ICD-10-CM

## 2018-04-10 DIAGNOSIS — M54.41 BILATERAL LOW BACK PAIN WITH BILATERAL SCIATICA, UNSPECIFIED CHRONICITY: ICD-10-CM

## 2018-04-10 DIAGNOSIS — Z98.1 HISTORY OF LUMBAR FUSION: Primary | ICD-10-CM

## 2018-04-10 DIAGNOSIS — M54.14 THORACIC AND LUMBOSACRAL NEURITIS: ICD-10-CM

## 2018-04-10 DIAGNOSIS — M47.819 SPONDYLOSIS WITHOUT MYELOPATHY: ICD-10-CM

## 2018-04-10 DIAGNOSIS — M48.061 SPINAL STENOSIS OF LUMBAR REGION WITHOUT NEUROGENIC CLAUDICATION: ICD-10-CM

## 2018-04-10 DIAGNOSIS — M54.17 THORACIC AND LUMBOSACRAL NEURITIS: ICD-10-CM

## 2018-04-10 PROCEDURE — 72082 X-RAY EXAM ENTIRE SPI 2/3 VW: CPT | Mod: 26,,, | Performed by: RADIOLOGY

## 2018-04-10 PROCEDURE — 72100 X-RAY EXAM L-S SPINE 2/3 VWS: CPT | Mod: 26,,, | Performed by: RADIOLOGY

## 2018-04-10 PROCEDURE — 72120 X-RAY BEND ONLY L-S SPINE: CPT | Mod: 26,,, | Performed by: RADIOLOGY

## 2018-04-10 PROCEDURE — 99999 PR PBB SHADOW E&M-EST. PATIENT-LVL IV: CPT | Mod: PBBFAC,,, | Performed by: PHYSICIAN ASSISTANT

## 2018-04-10 PROCEDURE — 72082 X-RAY EXAM ENTIRE SPI 2/3 VW: CPT | Mod: TC,FY

## 2018-04-10 PROCEDURE — 72120 X-RAY BEND ONLY L-S SPINE: CPT | Mod: TC,FY

## 2018-04-10 PROCEDURE — 3077F SYST BP >= 140 MM HG: CPT | Mod: CPTII,S$GLB,, | Performed by: PHYSICIAN ASSISTANT

## 2018-04-10 PROCEDURE — 3078F DIAST BP <80 MM HG: CPT | Mod: CPTII,S$GLB,, | Performed by: PHYSICIAN ASSISTANT

## 2018-04-10 PROCEDURE — 99204 OFFICE O/P NEW MOD 45 MIN: CPT | Mod: S$GLB,,, | Performed by: PHYSICIAN ASSISTANT

## 2018-04-10 RX ORDER — CYCLOBENZAPRINE HCL 10 MG
10 TABLET ORAL NIGHTLY PRN
Qty: 30 TABLET | Refills: 0 | Status: SHIPPED | OUTPATIENT
Start: 2018-04-10 | End: 2018-05-10

## 2018-04-10 RX ORDER — GABAPENTIN 300 MG/1
CAPSULE ORAL
Qty: 90 CAPSULE | Refills: 2 | Status: SHIPPED | OUTPATIENT
Start: 2018-04-10 | End: 2018-10-02 | Stop reason: SDUPTHER

## 2018-04-10 NOTE — LETTER
April 11, 2018      Sherwin Richardson DO  [id number] [place] [address]   [address]  [facility] [address]           [facility]  [address], [address]  [facility] [address]  Phone: [phone]  Fax: [phone]          Patient: Irais Mcneil   MR Number: [id number]   YOB: 1950   Date of Visit: 4/10/2018       Dear Dr. Sherwin Richardson:    Thank you for referring Irais Mcneil to me for evaluation. Attached you will find relevant portions of my assessment and plan of care.    If you have questions, please do not hesitate to call me. I look forward to following Irais Mcneil along with you.    Sincerely,    Janell Robb PA-C    Enclosure  CC:  No Recipients    If you would like to receive this communication electronically, please contact [email or web address] or [phone] to request more information on [employer] access.    For providers and/or their staff who would like to refer a patient to [facility], please contact us through our one-stop-shop provider referral line, [facility], at [phone].    If you feel you have received this communication in error or would no longer like to receive these types of communications, please e-mail [email or web address]

## 2018-04-10 NOTE — TELEPHONE ENCOUNTER
----- Message from Carloz Tate MA sent at 4/10/2018 12:26 PM CDT -----  Please schedule patient for injection

## 2018-04-11 ENCOUNTER — LAB VISIT (OUTPATIENT)
Dept: LAB | Facility: HOSPITAL | Age: 68
End: 2018-04-11
Attending: UROLOGY
Payer: COMMERCIAL

## 2018-04-11 DIAGNOSIS — R97.20 ELEVATED PSA: ICD-10-CM

## 2018-04-11 LAB — COMPLEXED PSA SERPL-MCNC: 8.1 NG/ML

## 2018-04-11 PROCEDURE — 36415 COLL VENOUS BLD VENIPUNCTURE: CPT

## 2018-04-11 PROCEDURE — 84153 ASSAY OF PSA TOTAL: CPT

## 2018-04-16 ENCOUNTER — TELEPHONE (OUTPATIENT)
Dept: PAIN MEDICINE | Facility: CLINIC | Age: 68
End: 2018-04-16

## 2018-04-16 ENCOUNTER — OFFICE VISIT (OUTPATIENT)
Dept: UROLOGY | Facility: CLINIC | Age: 68
End: 2018-04-16
Payer: COMMERCIAL

## 2018-04-16 VITALS
DIASTOLIC BLOOD PRESSURE: 71 MMHG | BODY MASS INDEX: 23.15 KG/M2 | HEART RATE: 61 BPM | HEIGHT: 69 IN | SYSTOLIC BLOOD PRESSURE: 122 MMHG | RESPIRATION RATE: 18 BRPM | TEMPERATURE: 99 F | WEIGHT: 156.31 LBS

## 2018-04-16 DIAGNOSIS — R97.20 ELEVATED PSA, LESS THAN 10 NG/ML: ICD-10-CM

## 2018-04-16 DIAGNOSIS — R97.20 ELEVATED PROSTATE SPECIFIC ANTIGEN (PSA): Primary | ICD-10-CM

## 2018-04-16 LAB
BILIRUB SERPL-MCNC: ABNORMAL MG/DL
BLOOD URINE, POC: ABNORMAL
COLOR, POC UA: YELLOW
GLUCOSE UR QL STRIP: ABNORMAL
KETONES UR QL STRIP: ABNORMAL
LEUKOCYTE ESTERASE URINE, POC: ABNORMAL
NITRITE, POC UA: ABNORMAL
PH, POC UA: 6
PROTEIN, POC: ABNORMAL
SPECIFIC GRAVITY, POC UA: 1.01
UROBILINOGEN, POC UA: ABNORMAL

## 2018-04-16 PROCEDURE — 3074F SYST BP LT 130 MM HG: CPT | Mod: CPTII,S$GLB,, | Performed by: UROLOGY

## 2018-04-16 PROCEDURE — 3078F DIAST BP <80 MM HG: CPT | Mod: CPTII,S$GLB,, | Performed by: UROLOGY

## 2018-04-16 PROCEDURE — 81002 URINALYSIS NONAUTO W/O SCOPE: CPT | Mod: S$GLB,,, | Performed by: UROLOGY

## 2018-04-16 PROCEDURE — 99213 OFFICE O/P EST LOW 20 MIN: CPT | Mod: 25,S$GLB,, | Performed by: UROLOGY

## 2018-04-16 PROCEDURE — 99999 PR PBB SHADOW E&M-EST. PATIENT-LVL III: CPT | Mod: PBBFAC,,, | Performed by: UROLOGY

## 2018-04-16 NOTE — TELEPHONE ENCOUNTER
----- Message from Diann Espinoza sent at 4/16/2018 11:30 AM CDT -----  Contact: zuri            Name of Who is Calling: zuri      What is the request in detail: wants to know about the epidural on 4/25/18 and if it is coded correctly because he has a 400.00 deductible. Call pt      Can the clinic reply by [facility]: no      What Number to Call Back if not in [facility]: [phone]

## 2018-04-16 NOTE — PROGRESS NOTES
OFFICE NOTE    Chief complaint:  Elevated PSA, 8.1.    HISTORY OF PRESENT ILLNESS:  Mr. Mcneil is a 67-year-old male who has a known   history of elevated PSA.  In 04/2017 his PSA was 6.1, 10/2017 still at 6.1, in   12/2015 he had a transrectal prostate ultrasound and biopsies that failed to   show evidence of cancer and at that time the prostate volume was approximately   41 cubic cm.  We did methylation test with confirmed MDX that shows the patient   has a likelihood of prostate cancer.  A repeat biopsy of approximately 60% with   about 33% to be of aggressive undifferentiated tumor.    I explained to the patient that this continues raising his PSA is somewhat   bothersome, and we may consider three options, one is keep observing and see if   this PSA we go any higher.  The other is to repeat a biopsy or maybe perform an   MRI of the prostate to see if there is any of the prostate of any concern and   this may match or not the areas of the prostate methylation test that shows more   methylation effect.  After a lengthy discussion, the patient agreed to proceed   with MRI of the prostate since the methylation test is somewhat positive.  We   decide to perform biopsies to focus more biopsies in certain areas of the   prostate that are of more concern.  He understood.  He is going to proceed with   MRI of the prostate with a followup appointment with me in the next three to   four weeks, and at that point, we are going to make a disposition how to proceed   from there.  All the questions were answered at his satisfaction and he left   the office in satisfactory condition.  I spent about 20 minutes with Mr. Mcneil, all the time was spent on counseling.      EOR/HN  dd: 04/16/2018 12:58:43 (CDT)  td: 04/17/2018 05:34:05 (CDT)  Doc ID   #[id number]  Job ID #[id number]    CC:

## 2018-04-16 NOTE — TELEPHONE ENCOUNTER
Contacted and spoke to Mr. Mcneil, he wanted to know why he received a call saying he had to pay $400.00. Patient was informed he would need to speak to financial services.     Staff attempted to transfer patient to patient financial and was told that patient needs to speak to pre service, they can only help once a bill has been sent to patient.     Phone hung up.

## 2018-04-20 ENCOUNTER — OFFICE VISIT (OUTPATIENT)
Dept: URGENT CARE | Facility: CLINIC | Age: 68
End: 2018-04-20
Payer: COMMERCIAL

## 2018-04-20 VITALS
TEMPERATURE: 98 F | HEIGHT: 69 IN | DIASTOLIC BLOOD PRESSURE: 84 MMHG | SYSTOLIC BLOOD PRESSURE: 126 MMHG | HEART RATE: 62 BPM | WEIGHT: 159.81 LBS | OXYGEN SATURATION: 99 % | BODY MASS INDEX: 23.67 KG/M2 | RESPIRATION RATE: 14 BRPM

## 2018-04-20 DIAGNOSIS — K64.4 EXTERNAL HEMORRHOID: Primary | ICD-10-CM

## 2018-04-20 PROCEDURE — 3074F SYST BP LT 130 MM HG: CPT | Mod: CPTII,S$GLB,, | Performed by: NURSE PRACTITIONER

## 2018-04-20 PROCEDURE — 3079F DIAST BP 80-89 MM HG: CPT | Mod: CPTII,S$GLB,, | Performed by: NURSE PRACTITIONER

## 2018-04-20 PROCEDURE — 99203 OFFICE O/P NEW LOW 30 MIN: CPT | Mod: S$GLB,,, | Performed by: NURSE PRACTITIONER

## 2018-04-20 PROCEDURE — 99999 PR PBB SHADOW E&M-EST. PATIENT-LVL IV: CPT | Mod: PBBFAC,,, | Performed by: NURSE PRACTITIONER

## 2018-04-20 RX ORDER — HYDROCORTISONE 25 MG/G
CREAM TOPICAL 2 TIMES DAILY
Qty: 28 G | Refills: 1 | Status: SHIPPED | OUTPATIENT
Start: 2018-04-20 | End: 2018-09-06

## 2018-04-20 NOTE — PATIENT INSTRUCTIONS
Stool softener  Lots of fluids  Sitz baths  Tucks or preporation h wipes over the counter  If symptoms worsen or fail to improve with treatment, see your Primary Care Provider or go to the nearest Emergency Room.        Hemorrhoids    Hemorrhoids are swollen and inflamed veins inside the rectum and near the anus. The rectum is the last several inches of the colon. The anus is the passage between the rectum and the outside of the body.  Causes  The veins can become swollen due to increased pressure in them. This is most often caused by:  · Chronic constipation or diarrhea  · Straining when having a bowel movement  · Sitting too long on the toilet  · A low-fiber diet  · Pregnancy  Symptoms  · Bleeding from the rectum (this may be noticeable after bowel movements)  · Lump near the anus  · Itching around the anus  · Pain around the anus  There are different types of hemorrhoids. Depending on the type you have and the severity, you may be able to treat yourself at home. In some cases, a procedure may be the best treatment option. Your healthcare provider can tell you more about this, if needed.  Home care  General care  · To get relief from pain or itching, try:  ¨ Topical products. Your healthcare provider may prescribe or recommend creams, ointments, or pads that can be applied to the hemorrhoid. Use these exactly as directed.  ¨ Medicines. Your healthcare provider may recommend stool softeners, suppositories, or laxatives to help manage constipation. Use these exactly as directed.  ¨ Sitz baths. A sitz bath involves sitting in a few inches of warm bath water. Be careful not to make the water so hot that you burn yourself--test it before sitting in it. Soak for about 10 to 15 minutes a few times a day. This may help relieve pain.  Tips to help prevent hemorrhoids  · Eat more fiber. Fiber adds bulk to stool and absorbs water as it moves through your colon. This makes stool softer and easier to pass.  ¨ Increase the  fiber in your diet with more fiber-rich foods. These include fresh fruit, vegetables, and whole grains.  ¨ Take a fiber supplement or bulking agent, if advised to by your provider. These include products such as psyllium or methylcellulose.  · Drink plenty of water, if directed to by your provider. This can help keep stool soft.  · Be more active. Frequent exercise aids digestion and helps prevent constipation. It may also help make bowel movements more regular.  · Dont strain during bowel movements. This can make hemorrhoids more likely. Also, dont sit on the toilet for long periods of time.  Follow-up care  Follow up with your healthcare provider, or as advised. If a culture or imaging tests were done, you will be notified of the results when they are ready. This may take a few days or longer.  When to seek medical advice  Call your healthcare provider right away if any of these occur:  · Increased bleeding from the rectum  · Increased pain around the rectum or anus  · Weakness or dizziness  Call 911  Call 911 or return to the emergency department right away if any of these occur:  · Trouble breathing or swallowing  · Fainting or loss of consciousness  · Unusually fast heart rate  · Vomiting blood  · Large amounts of blood in stool  Date Last Reviewed: 6/22/2015  © 1970-6702 [employer]. [address] [place] [address], [place] [address]. All rights reserved. This information is not intended as a substitute for professional medical care. Always follow your healthcare professional's instructions.

## 2018-04-24 ENCOUNTER — HOSPITAL ENCOUNTER (OUTPATIENT)
Dept: RADIOLOGY | Facility: HOSPITAL | Age: 68
Discharge: HOME | End: 2018-04-24
Attending: UROLOGY
Payer: COMMERCIAL

## 2018-04-24 DIAGNOSIS — R97.20 ELEVATED PSA, LESS THAN 10 NG/ML: ICD-10-CM

## 2018-04-24 LAB
CREAT SERPL-MCNC: 1.5 MG/DL (ref 0.5–1.4)
SAMPLE: ABNORMAL

## 2018-04-24 PROCEDURE — 25500020 PHARM REV CODE 255: Performed by: UROLOGY

## 2018-04-24 PROCEDURE — 72197 MRI PELVIS W/O & W/DYE: CPT | Mod: TC

## 2018-04-24 PROCEDURE — 72197 MRI PELVIS W/O & W/DYE: CPT | Mod: 26,,, | Performed by: RADIOLOGY

## 2018-04-24 PROCEDURE — A9585 GADOBUTROL INJECTION: HCPCS | Performed by: UROLOGY

## 2018-04-24 RX ORDER — GADOBUTROL 604.72 MG/ML
8 INJECTION INTRAVENOUS
Status: COMPLETED | OUTPATIENT
Start: 2018-04-24 | End: 2018-04-24

## 2018-04-24 RX ADMIN — GADOBUTROL 8 ML: 604.72 INJECTION INTRAVENOUS at 02:04

## 2018-04-25 ENCOUNTER — HOSPITAL ENCOUNTER (OUTPATIENT)
Facility: OTHER | Age: 68
Discharge: HOME | End: 2018-04-25
Attending: ANESTHESIOLOGY | Admitting: ANESTHESIOLOGY
Payer: COMMERCIAL

## 2018-04-25 ENCOUNTER — SURGERY (OUTPATIENT)
Age: 68
End: 2018-04-25

## 2018-04-25 VITALS
RESPIRATION RATE: 16 BRPM | HEART RATE: 56 BPM | SYSTOLIC BLOOD PRESSURE: 122 MMHG | TEMPERATURE: 98 F | HEIGHT: 69 IN | BODY MASS INDEX: 23.4 KG/M2 | OXYGEN SATURATION: 100 % | WEIGHT: 158 LBS | DIASTOLIC BLOOD PRESSURE: 69 MMHG

## 2018-04-25 DIAGNOSIS — M54.17 LUMBOSACRAL RADICULOPATHY: ICD-10-CM

## 2018-04-25 DIAGNOSIS — G89.29 CHRONIC PAIN: ICD-10-CM

## 2018-04-25 DIAGNOSIS — M51.36 DDD (DEGENERATIVE DISC DISEASE), LUMBAR: Primary | ICD-10-CM

## 2018-04-25 PROCEDURE — 25500020 PHARM REV CODE 255: Performed by: ANESTHESIOLOGY

## 2018-04-25 PROCEDURE — 63600175 PHARM REV CODE 636 W HCPCS: Performed by: ANESTHESIOLOGY

## 2018-04-25 PROCEDURE — 64483 NJX AA&/STRD TFRM EPI L/S 1: CPT | Mod: 50,,, | Performed by: ANESTHESIOLOGY

## 2018-04-25 PROCEDURE — 99152 MOD SED SAME PHYS/QHP 5/>YRS: CPT | Mod: ,,, | Performed by: ANESTHESIOLOGY

## 2018-04-25 PROCEDURE — 64483 NJX AA&/STRD TFRM EPI L/S 1: CPT | Mod: 50 | Performed by: ANESTHESIOLOGY

## 2018-04-25 PROCEDURE — 25000003 PHARM REV CODE 250: Performed by: ANESTHESIOLOGY

## 2018-04-25 RX ORDER — LIDOCAINE HYDROCHLORIDE 5 MG/ML
INJECTION, SOLUTION INFILTRATION; INTRAVENOUS
Status: DISCONTINUED | OUTPATIENT
Start: 2018-04-25 | End: 2018-04-25 | Stop reason: HOSPADM

## 2018-04-25 RX ORDER — MIDAZOLAM HYDROCHLORIDE 1 MG/ML
INJECTION INTRAMUSCULAR; INTRAVENOUS
Status: DISCONTINUED | OUTPATIENT
Start: 2018-04-25 | End: 2018-04-25 | Stop reason: HOSPADM

## 2018-04-25 RX ORDER — DEXAMETHASONE SODIUM PHOSPHATE 10 MG/ML
INJECTION INTRAMUSCULAR; INTRAVENOUS
Status: DISCONTINUED | OUTPATIENT
Start: 2018-04-25 | End: 2018-04-25 | Stop reason: HOSPADM

## 2018-04-25 RX ORDER — FENTANYL CITRATE 50 UG/ML
INJECTION, SOLUTION INTRAMUSCULAR; INTRAVENOUS
Status: DISCONTINUED | OUTPATIENT
Start: 2018-04-25 | End: 2018-04-25 | Stop reason: HOSPADM

## 2018-04-25 RX ORDER — SODIUM CHLORIDE 9 MG/ML
500 INJECTION, SOLUTION INTRAVENOUS CONTINUOUS
Status: DISCONTINUED | OUTPATIENT
Start: 2018-04-25 | End: 2018-04-25 | Stop reason: HOSPADM

## 2018-04-25 RX ORDER — LIDOCAINE HYDROCHLORIDE 10 MG/ML
INJECTION, SOLUTION EPIDURAL; INFILTRATION; INTRACAUDAL; PERINEURAL
Status: DISCONTINUED | OUTPATIENT
Start: 2018-04-25 | End: 2018-04-25 | Stop reason: HOSPADM

## 2018-04-25 RX ADMIN — MIDAZOLAM HYDROCHLORIDE 1 MG: 1 INJECTION, SOLUTION INTRAMUSCULAR; INTRAVENOUS at 09:04

## 2018-04-25 RX ADMIN — DEXAMETHASONE SODIUM PHOSPHATE 10 MG: 10 INJECTION, SOLUTION INTRAMUSCULAR; INTRAVENOUS at 09:04

## 2018-04-25 RX ADMIN — IOHEXOL 3 ML: 300 INJECTION, SOLUTION INTRAVENOUS at 09:04

## 2018-04-25 RX ADMIN — LIDOCAINE HYDROCHLORIDE 10 ML: 5 INJECTION, SOLUTION INFILTRATION; INTRAVENOUS at 09:04

## 2018-04-25 RX ADMIN — SODIUM CHLORIDE 500 ML: 0.9 INJECTION, SOLUTION INTRAVENOUS at 09:04

## 2018-04-25 RX ADMIN — FENTANYL CITRATE 50 MCG: 50 INJECTION, SOLUTION INTRAMUSCULAR; INTRAVENOUS at 09:04

## 2018-04-25 RX ADMIN — LIDOCAINE HYDROCHLORIDE 10 ML: 10 INJECTION, SOLUTION EPIDURAL; INFILTRATION; INTRACAUDAL; PERINEURAL at 09:04

## 2018-04-25 NOTE — DISCHARGE INSTRUCTIONS
Thank you for allowing us to care for you today. You may receive a survey about the care we provided. Your feedback is valuable and helps us provide excellent care throughout the community. Home Care Instructions Pain Management:    1. DIET:   You may resume your normal diet today.   2. BATHING:   You may shower with luke warm water.  3. DRESSING:   You may remove your bandage today.   4. ACTIVITY LEVEL:   You may resume your normal activities 24 hrs after your procedure.  5. MEDICATIONS:   You may resume your normal medications today.   6. SPECIAL INSTRUCTIONS:   No heat to the injection site for 24 hrs including, bath or shower, heating pad, moist heat, or hot tubs.    Use ice pack to injection site for any pain or discomfort.  Apply ice packs for 20 minute intervals as needed.   If you have received any sedatives by mouth today you may not drive for 12 hours.    If you have received any sedation through your IV, you may not drive for 24 hrs.     PLEASE CALL YOUR DOCTOR IF:  1. Redness or swelling around the injection site.  2. Fever of 101 degrees  3. Drainage (pus) from the injection site.  4. For any continuous bleeding (some dried blood over the incision is normal.)    FOR EMERGENCIES:   If any unusual problems or difficulties occur during clinic hours, call [phone] or [phone].   Adult Procedural Sedation Instructions    Recovery After Procedural Sedation (Adult)  You have been given medicine by vein to make you sleep during your surgery. This may have included both a pain medicine and sleeping medicine. Most of the effects have worn off. But you may still have some drowsiness for the next 6 to 8 hours.  Home care  Follow these guidelines when you get home:  · For the next 8 hours, you should be watched by a responsible adult. This person should make sure your condition is not getting worse.  · Don't drink any alcohol for the next 24 hours.  · Don't drive, operate dangerous machinery, or make important  business or personal decisions during the next 24 hours.  Note: Your healthcare provider may tell you not to take any medicine by mouth for pain or sleep in the next 4 hours. These medicines may react with the medicines you were given in the hospital. This could cause a much stronger response than usual.  Follow-up care  Follow up with your healthcare provider if you are not alert and back to your usual level of activity within 12 hours.  When to seek medical advice  Call your healthcare provider right away if any of these occur:  · Drowsiness gets worse  · Weakness or dizziness gets worse  · Repeated vomiting  · You can't be awakened   Date Last Reviewed: 10/18/2016  © 3391-4269 [employer]. [address] [place] [address], [place] [address]. All rights reserved. This information is not intended as a substitute for professional medical care. Always follow your healthcare professional's instructions.

## 2018-04-25 NOTE — H&P (VIEW-ONLY)
Subjective:      Patient ID: Irais Mcneil is a 67 y.o. male.    Chief Complaint: Low-back Pain      HPI  ([facility])    History of migraines, CKD stage 2, hyperlipidemia, HTN.     History of spinal surgery in 2000- had fusion at L4-L5 ([facility]). He had postop infection, he had a washout done and he was on IV antibiotics. His pain did slowly improve with time.     Pain has been worse in the last year. He complains of constant LBP with bilateral anterior thigh pain and posterior pain behind the knees. LBP > leg pain. Right leg pain > left leg pain. Pain is worse with sitting and driving. He has some pain when he first stands up, but does okay as he continues to walk. He rates his pain as a 4 on a scale of 1-10. Pain is aching and sharp. No numbness, tingling, or weakness in his legs. He is on prn ultram with some improvement.     No recent PT for his back. No ESIs since surgery. Lumbar surgery as above. He takes prn OTC tylenol with no improvement of his back.     Patient denies fevers, chills, night sweats, nausea, vomiting, and weight loss. Patient also denies bowel/bladder dysfunction, sexual dysfunction, and any saddle anesthesia.       Review of Systems   Constitution: Positive for weight loss. Negative for fever, weakness, malaise/fatigue, night sweats and weight gain.   HENT: Positive for hearing loss. Negative for nosebleeds and odynophagia.    Eyes: Positive for blurred vision. Negative for double vision.   Cardiovascular: Negative for chest pain, irregular heartbeat and palpitations.   Respiratory: Negative for cough, hemoptysis, shortness of breath and wheezing.    Endocrine: Negative for cold intolerance and polydipsia.   Hematologic/Lymphatic: Does not bruise/bleed easily.   Skin: Negative for dry skin, poor wound healing, rash and suspicious lesions.   Musculoskeletal: Positive for back pain and muscle cramps.        See HPI for pertinent positives.   Gastrointestinal: Negative for bloating, abdominal  pain, constipation, diarrhea, hematochezia, melena, nausea and vomiting.   Genitourinary: Negative for bladder incontinence, dysuria, hematuria, hesitancy and incomplete emptying.        Positive for any type sexual dysfunction.    Neurological: Positive for headaches and paresthesias. Negative for disturbances in coordination, dizziness, focal weakness, loss of balance, numbness and seizures.   Psychiatric/Behavioral: Negative for depression and hallucinations. The patient is not nervous/anxious.            Objective:        General: Irais is well-developed, well-nourished, appears stated age, in no acute distress, alert and oriented to time, place and person.     General    Vitals reviewed.  Constitutional: He is oriented to person, place, and time. He appears well-developed and well-nourished.   Pulmonary/Chest: Effort normal.   Abdominal: He exhibits no distension.   Neurological: He is alert and oriented to person, place, and time.   Psychiatric: He has a normal mood and affect. His behavior is normal. Judgment and thought content normal.           Gait: normal, tandem walking is normal and he is able to heel/toe stand.     On exam of the lumbar spine, Inspection of back is normal, well healed lumbar incision. Mild central lower lumbar tenderness.     Skin in lumbar region is warm to the touch without visible rashes.     muscle tone normal without spasm, limited range of motion with pain  Pain in flexion. and Pain in extension.    Strength testing of the bilateral LEs shows  Right hip abduction:  +5/5  Left hip abduction:  +5/5  Right hip flexion:  +5/5   Left hip flexion:  +5/5  Right hip extensors:  +5/5  Left hip extensors:  +5/5  Right quadriceps:  +5/5  Left quadriceps:  +5/5  Right hamstring:  +5/5  Left hamstring:  +5/5  Right dorsiflexion:  +5/5  Left dorsiflexion:  +5/5  Right plantar flexion:  +5/5  Left plantar flexion:  +5/5   Right EHL:  +5/5   Left EHL:  +5/5    negative clonus of bilateral LEs.      negative straight leg raise on bilateral LEs.     DTRs:  Right patellar:  +2     Left patellar:  +2  Right achilles:  +2   Left achilles:  +2    Sensation is grossly intact in L2, L3, L4, L5, and S1 distribution.    Right hip has no pain with IR/ER. Left hip has no pain with IR/ER.       On exam of bilateral UEs, patient has full painfree ROM with no signs of clubbing, laxity, cyanosis, edema, instability, weakness, or tenderness.       XRAY INTERPRETATION:  X-rays of lumbar spine (AP/lat/flex/ext) dated 4/10/18 are personally reviewed and show right sided lumbar curve, uninstrumented fusion L4-L5, retrolisthesis L3-L4, diffuse lumbar spondylosis/DDD.    MRI of lumbar spine dated 3/27/18 is personally reviewed and shows mild central and left foraminal stenosis L1-L2. Mild central stenosis L2-L3 with mild left/moderate right foraminal stenosis. Moderate central stenosis L3-L4 with mild left/moderate right foraminal stenosis. Fusion L4-L5. Mild central stenosis L5-S1 with mild bilateral foraminal stenosis.     Above imaging reviewed with Dr. Diez.         Assessment:       1. History of lumbar fusion    2. Spondylosis without myelopathy    3. DDD (degenerative disc disease), lumbosacral    4. Spinal stenosis of lumbar region without neurogenic claudication    5. Thoracic and lumbosacral neuritis    6. Acquired spondylolisthesis    7. Bilateral low back pain with bilateral sciatica, unspecified chronicity           Plan:       Orders Placed This Encounter    Procedure Order to [facility]    X-Ray Scoliosis Complete    gabapentin (NEURONTIN) 300 MG capsule    cyclobenzaprine (FLEXERIL) 10 MG tablet       History of spinal surgery in 2000- had fusion at L4-L5 ([facility]). He had postop infection, he had a washout done and he was on IV antibiotics. Pain improved until last year. Now with constant LBP with bilateral anterior thigh pain and posterior pain behind the knees. LBP > leg pain. Right leg pain  > left leg pain. Fusion looks good at L4-L5, he has adjacent segment disease at L3-L4 with moderate stenosis and mild/left moderate right foraminal stenosis. Also with mild central stenosis and varying foraminal stenosis L1-L2, L2-L3, and L5-S1. Pain generator is likely L3-L4. Above lumbar XRs/MRI reviewed with patient. Spoke to patient's wife on speaker phone as well during appointment.     Dr. Diez was in to discuss treatment options with patient and following plan made:     - Set up for bilateral L3-L4 TF LAKEISHA with pain management. May try facet injections as well.   - Full length scoliosis XRs on his way out. Will review at his follow up.   - Trial if flexeril q hs prn. Reviewed dosing and side effects.   - Trial of neurontin 300mg g hs to increase to tid as tolerated. Reviewed dosing and side effects.   - He will f/u on Rg.     Follow-up: Follow-up in about 6 weeks (around 5/22/2018). If there are any questions prior to this, the patient was instructed to contact the office.

## 2018-04-25 NOTE — OP NOTE
Patient Name: Irais Mcneil  MRN: [id number]    INFORMED CONSENT: The procedure, risks, benefits and options were discussed with patient. There are no contraindications to the procedure. The patient expressed understanding and agreed to proceed. The personnel performing the procedure was discussed. I verify that I personally obtained Irais's consent prior to the start of the procedure and the signed consent can be found on the patient's chart.    Procedure Date: 04/25/2018    Anesthesia: Topical    Pre Procedure diagnosis: DDD (degenerative disc disease), lumbosacral [M51.37]  Spinal stenosis of lumbar region without neurogenic claudication [M48.061]  1. DDD (degenerative disc disease), lumbar    2. Lumbosacral radiculopathy    3. Chronic pain      Post-Procedure diagnosis: SAME      Sedation: Yes - Fentanyl 50 mcg and Midazolam 1 mg    PROCEDURE:Bilateral L3-4   TRANSFORAMINAL EPIDURAL STEROID INJECTION        DESCRIPTION OF PROCEDURE: The patient was brought to the procedure room. After performing time out IV access was obtained prior to the procedure. The patient was positioned prone on the fluoroscopy table. Continuous hemodynamic monitoring was initiated including blood pressure, EKG, and pulse oximetry. . The skin was prepped with chlorhexidine three times and draped in a sterile fashion. Skin anesthesia was achieved using 3 mL of lidocaine 1% over the respective injection site.     An oblique fluoroscopic view was obtained, with the superior articular process of the inferior vertebral body aligned with the pedicle. The tip of a 22-gauge 3.5-inch Quincke-type spinal needle was advanced toward the 6 oclock position of the pedicle under intermittent fluoroscopic guidance. Confirmation of proper needle position was made with AP, oblique, and lateral fluoroscopic views. Negative aspiration for blood or CSF was confirmed. 2 mL of Omnipaque 300 was injected. Live fluoroscopic imaging revealed a clear outline of  the spinal nerve with proximal spread of agent through the neural foramen into the anterior epidural space. A total combination of 3 mL of Lidocaine 0.5% and 10 mg decadron was injected at each level. Contrast spread was noted from L3 to L4 level. There was no pain on injection. The needle was removed and bleeding was nil.  A sterile dressing was applied. Esbii was taken back to the recovery room for further observation.     Blood Loss: Nill  Specimen: None    Felix Person MD

## 2018-04-25 NOTE — DISCHARGE SUMMARY
Discharge Note  Short Stay      SUMMARY     Admit Date: 4/25/2018    Attending Physician: Felix Person      Discharge Physician: Felix Person      Discharge Date: 4/25/2018 10:17 AM    Procedure(s) (LRB):  INJECTION-STEROID-EPIDURAL-TRANSFORAMINAL (Bilateral)    Final Diagnosis: DDD (degenerative disc disease), lumbosacral [M51.37]  Spinal stenosis of lumbar region without neurogenic claudication [M48.061]    Disposition: Home or self care    Patient Instructions:   Current Discharge Medication List      CONTINUE these medications which have NOT CHANGED    Details   amLODIPine (NORVASC) 10 MG tablet TAKE 1 TABLET (10 MG TOTAL) BY MOUTH ONCE DAILY.  Qty: 30 tablet, Refills: 5    Associated Diagnoses: Essential hypertension      aspirin (ECOTRIN) 81 MG EC tablet Take by mouth.      atorvastatin (LIPITOR) 20 MG tablet TAKE 1 TABLET BY MOUTH EVERY EVENING.  Qty: 30 tablet, Refills: 2    Associated Diagnoses: Essential hypertension      cyclobenzaprine (FLEXERIL) 10 MG tablet Take 1 tablet (10 mg total) by mouth nightly as needed.  Qty: 30 tablet, Refills: 0    Associated Diagnoses: History of lumbar fusion; Spondylosis without myelopathy; DDD (degenerative disc disease), lumbosacral; Spinal stenosis of lumbar region without neurogenic claudication; Thoracic and lumbosacral neuritis; Acquired spondylolisthesis; Bilateral low back pain with bilateral sciatica, unspecified chronicity      folic acid (FOLVITE) 1 MG tablet TAKE 1 TABLET BY MOUTH EVERY DAY  Qty: 90 tablet, Refills: 0    Associated Diagnoses: Peripheral vascular disease      gabapentin (NEURONTIN) 300 MG capsule 1 po q hs x 3 days, then 1 po bid x 3 days, then 1 po tid.  Qty: 90 capsule, Refills: 2    Associated Diagnoses: History of lumbar fusion; Spondylosis without myelopathy; DDD (degenerative disc disease), lumbosacral; Spinal stenosis of lumbar region without neurogenic claudication; Thoracic and lumbosacral neuritis; Acquired spondylolisthesis;  Bilateral low back pain with bilateral sciatica, unspecified chronicity      hydrocortisone 2.5 % cream Apply topically 2 (two) times daily.  Qty: 28 g, Refills: 1      topiramate (TOPAMAX) 25 MG tablet TAKE 1 TABLET (25 MG TOTAL) BY MOUTH ONCE DAILY.  Qty: 90 tablet, Refills: 1    Associated Diagnoses: Atypical migraine      traMADol (ULTRAM) 50 mg tablet TAKE 1-2 TABLETS EVERY 6-8 HOURS AS NEEDED FOR PAIN  Qty: 30 tablet, Refills: 1    Comments: Not to exceed 4 additional fills before 08/08/2018.      valsartan (DIOVAN) 40 MG tablet Take 40 mg by mouth once daily.  Refills: 3                 Discharge Diagnosis: DDD (degenerative disc disease), lumbosacral [M51.37]  Spinal stenosis of lumbar region without neurogenic claudication [M48.061]  Condition on Discharge: Stable with no complications to procedure   Diet on Discharge: Same as before.  Activity: as per instruction sheet.  Discharge to: Home with a responsible adult.  Follow up: as per Discharge instructions.

## 2018-04-26 ENCOUNTER — TELEPHONE (OUTPATIENT)
Dept: UROLOGY | Facility: CLINIC | Age: 68
End: 2018-04-26

## 2018-04-26 NOTE — TELEPHONE ENCOUNTER
Left message for patient to call back.  Patient will need to see Dr. Hill to plan for prostate bx.

## 2018-04-26 NOTE — TELEPHONE ENCOUNTER
----- Message from Nakul Maza sent at 4/26/2018  2:26 PM CDT -----  Contact: self  Type:  Patient Returning Call  Who Called: patient  Who Left Message for Patient:  Nurse Does the patient know what this is regarding?:    Best Call Back Number:  [phone]  Additional Information:  Patient returning the nurse phone call.

## 2018-05-14 ENCOUNTER — LAB VISIT (OUTPATIENT)
Dept: LAB | Facility: HOSPITAL | Age: 68
End: 2018-05-14
Payer: COMMERCIAL

## 2018-05-14 DIAGNOSIS — Z13.220 SCREENING CHOLESTEROL LEVEL: ICD-10-CM

## 2018-05-14 LAB
CHOLEST SERPL-MCNC: 185 MG/DL
CHOLEST/HDLC SERPL: 2.4 {RATIO}
HDLC SERPL-MCNC: 77 MG/DL
HDLC SERPL: 41.6 %
LDLC SERPL CALC-MCNC: 100 MG/DL
NONHDLC SERPL-MCNC: 108 MG/DL
TRIGL SERPL-MCNC: 40 MG/DL

## 2018-05-14 PROCEDURE — 80061 LIPID PANEL: CPT

## 2018-05-14 PROCEDURE — 36415 COLL VENOUS BLD VENIPUNCTURE: CPT

## 2018-05-17 ENCOUNTER — OFFICE VISIT (OUTPATIENT)
Dept: UROLOGY | Facility: CLINIC | Age: 68
End: 2018-05-17
Payer: COMMERCIAL

## 2018-05-17 VITALS
SYSTOLIC BLOOD PRESSURE: 145 MMHG | DIASTOLIC BLOOD PRESSURE: 88 MMHG | HEART RATE: 72 BPM | BODY MASS INDEX: 23.28 KG/M2 | TEMPERATURE: 98 F | HEIGHT: 69 IN | RESPIRATION RATE: 18 BRPM | WEIGHT: 157.19 LBS

## 2018-05-17 DIAGNOSIS — R97.20 ELEVATED PROSTATE SPECIFIC ANTIGEN (PSA): Primary | ICD-10-CM

## 2018-05-17 LAB
BILIRUB SERPL-MCNC: NORMAL MG/DL
BLOOD URINE, POC: NORMAL
COLOR, POC UA: YELLOW
GLUCOSE UR QL STRIP: NORMAL
KETONES UR QL STRIP: NORMAL
LEUKOCYTE ESTERASE URINE, POC: NORMAL
NITRITE, POC UA: NORMAL
PH, POC UA: 7
PROTEIN, POC: NORMAL
SPECIFIC GRAVITY, POC UA: 1
UROBILINOGEN, POC UA: NORMAL

## 2018-05-17 PROCEDURE — 81002 URINALYSIS NONAUTO W/O SCOPE: CPT | Mod: S$GLB,,, | Performed by: UROLOGY

## 2018-05-17 PROCEDURE — 3008F BODY MASS INDEX DOCD: CPT | Mod: CPTII,S$GLB,, | Performed by: UROLOGY

## 2018-05-17 PROCEDURE — 99214 OFFICE O/P EST MOD 30 MIN: CPT | Mod: 25,S$GLB,, | Performed by: UROLOGY

## 2018-05-17 PROCEDURE — 99999 PR PBB SHADOW E&M-EST. PATIENT-LVL III: CPT | Mod: PBBFAC,,, | Performed by: UROLOGY

## 2018-05-17 PROCEDURE — 3077F SYST BP >= 140 MM HG: CPT | Mod: CPTII,S$GLB,, | Performed by: UROLOGY

## 2018-05-17 PROCEDURE — 3079F DIAST BP 80-89 MM HG: CPT | Mod: CPTII,S$GLB,, | Performed by: UROLOGY

## 2018-05-17 RX ORDER — CYCLOBENZAPRINE HCL 10 MG
10 TABLET ORAL 3 TIMES DAILY PRN
Qty: 90 TABLET | Refills: 0 | Status: SHIPPED | OUTPATIENT
Start: 2018-05-17 | End: 2018-06-16

## 2018-05-17 RX ORDER — CEFUROXIME AXETIL 500 MG/1
500 TABLET ORAL 2 TIMES DAILY
Qty: 14 TABLET | Refills: 0 | Status: SHIPPED | OUTPATIENT
Start: 2018-05-17 | End: 2018-06-06 | Stop reason: ALTCHOICE

## 2018-05-17 RX ORDER — CYCLOBENZAPRINE HCL 10 MG
10 TABLET ORAL 3 TIMES DAILY PRN
COMMUNITY
End: 2018-05-17 | Stop reason: SDUPTHER

## 2018-05-17 RX ORDER — CIPROFLOXACIN 500 MG/1
500 TABLET ORAL 2 TIMES DAILY
Qty: 14 TABLET | Refills: 0 | Status: SHIPPED | OUTPATIENT
Start: 2018-05-17 | End: 2018-06-06 | Stop reason: ALTCHOICE

## 2018-05-17 NOTE — PROGRESS NOTES
OFFICE NOTE    CHIEF COMPLAINT:  Elevated PSA.    HISTORY OF PRESENT ILLNESS:  This 67-year-old male returns for routine recheck.    He has been followed by Dr. Milton with a history of elevated PSA, in fact he   has undergone a prostate ultrasound with biopsy in 2015, which did not show any   evidence of any malignancy.  He has continued to be followed and MDX revealed   high suspicion and likelihood of developing prostate cancer with  60% probability   and he also recently underwent an MRI of the prostate, which also revealed a   high probability of developing prostate cancer with a grade of 5/5.  These   findings were discussed with the patient and it was recommended that he undergo   a repeat transrectal ultrasound biopsy of the prostate again to which he was   agreeable and we discussed that we would plan to do this under general   anesthesia.  His most recent PSA was 8.1 on 04/11/2018 that compares to 6.1 in   October 2017.    PHYSICAL EXAMINATION:  ABDOMEN:  Soft, benign and nontender.  No masses.  No hernias, organomegaly.  EXTERNAL GENITALIA:  Normal phallus with adequate meatus.  Testes descended and   feel normal.  No scrotal masses.  RECTAL:  Reveals an enlarged, firm prostate, although no distinct nodules.    UA negative with pH 7.0.    FINAL IMPRESSION:  Elevated prostate-specific antigen.    RECOMMENDATIONS:  Transrectal ultrasound and biopsy of the prostate under   anesthesia that is tentatively being scheduled for 05/28/2018.  He was given a prescription for Ceftin 500 mg bid and Cipro 500 mg bid that he will start taking the day before the procedure.      PAIGE  dd: 05/17/2018 16:25:56 (CDT)  td: 05/18/2018 11:51:19 (CDT)  Doc ID   #[id number]  Job ID #[id number]    CC:

## 2018-05-17 NOTE — TELEPHONE ENCOUNTER
----- Message from Alana Maza sent at 5/17/2018  9:30 AM CDT -----  Can the clinic reply in [facility]: no      Please refill the medication(s) listed below. The patient can be reached at this phone number [phone] once it is called into the pharmacy.      Medication #1cyclobenzaprine (FLEXERIL) 10 MG tablet 30 tablet 0 4/10/2018 5/10/2018 --  Sig - Route: Take 1 tablet (10 mg total) by mouth nightly as needed. - Oral  Class: Normal  Order: [id number]      cyclobenzaprine (FLEXERIL) 10 MG tablet 30 tablet 0 4/10/2018 5/10/2018 --  Sig - Route: Take 1 tablet (10 mg total) by mouth nightly as needed. - Oral  Class: Normal  Order: [id number]

## 2018-05-22 ENCOUNTER — OFFICE VISIT (OUTPATIENT)
Dept: SPINE | Facility: CLINIC | Age: 68
End: 2018-05-22
Payer: COMMERCIAL

## 2018-05-22 ENCOUNTER — TELEPHONE (OUTPATIENT)
Dept: UROLOGY | Facility: CLINIC | Age: 68
End: 2018-05-22

## 2018-05-22 VITALS
SYSTOLIC BLOOD PRESSURE: 137 MMHG | WEIGHT: 157 LBS | HEIGHT: 69 IN | BODY MASS INDEX: 23.25 KG/M2 | DIASTOLIC BLOOD PRESSURE: 68 MMHG | HEART RATE: 68 BPM

## 2018-05-22 DIAGNOSIS — Z98.1 HISTORY OF LUMBAR FUSION: ICD-10-CM

## 2018-05-22 DIAGNOSIS — M54.17 THORACIC AND LUMBOSACRAL NEURITIS: ICD-10-CM

## 2018-05-22 DIAGNOSIS — M54.42 BILATERAL LOW BACK PAIN WITH BILATERAL SCIATICA, UNSPECIFIED CHRONICITY: ICD-10-CM

## 2018-05-22 DIAGNOSIS — M48.061 SPINAL STENOSIS OF LUMBAR REGION WITHOUT NEUROGENIC CLAUDICATION: ICD-10-CM

## 2018-05-22 DIAGNOSIS — M51.37 DDD (DEGENERATIVE DISC DISEASE), LUMBOSACRAL: ICD-10-CM

## 2018-05-22 DIAGNOSIS — M54.14 THORACIC AND LUMBOSACRAL NEURITIS: ICD-10-CM

## 2018-05-22 DIAGNOSIS — M47.819 SPONDYLOSIS WITHOUT MYELOPATHY: ICD-10-CM

## 2018-05-22 DIAGNOSIS — M54.41 BILATERAL LOW BACK PAIN WITH BILATERAL SCIATICA, UNSPECIFIED CHRONICITY: ICD-10-CM

## 2018-05-22 DIAGNOSIS — M43.10 ACQUIRED SPONDYLOLISTHESIS: ICD-10-CM

## 2018-05-22 DIAGNOSIS — M41.9 SCOLIOSIS, UNSPECIFIED SCOLIOSIS TYPE, UNSPECIFIED SPINAL REGION: Primary | ICD-10-CM

## 2018-05-22 PROCEDURE — 99214 OFFICE O/P EST MOD 30 MIN: CPT | Mod: S$GLB,,, | Performed by: PHYSICIAN ASSISTANT

## 2018-05-22 PROCEDURE — 3008F BODY MASS INDEX DOCD: CPT | Mod: CPTII,S$GLB,, | Performed by: PHYSICIAN ASSISTANT

## 2018-05-22 PROCEDURE — 3078F DIAST BP <80 MM HG: CPT | Mod: CPTII,S$GLB,, | Performed by: PHYSICIAN ASSISTANT

## 2018-05-22 PROCEDURE — 99999 PR PBB SHADOW E&M-EST. PATIENT-LVL IV: CPT | Mod: PBBFAC,,, | Performed by: PHYSICIAN ASSISTANT

## 2018-05-22 PROCEDURE — 3075F SYST BP GE 130 - 139MM HG: CPT | Mod: CPTII,S$GLB,, | Performed by: PHYSICIAN ASSISTANT

## 2018-05-22 NOTE — TELEPHONE ENCOUNTER
----- Message from Criss Limon sent at 5/22/2018  8:12 AM CDT -----  Contact: self  Type: Needs Medical Advice    Who Called:  self  Symptoms (please be specific):  ADRIAN  How long has patient had these symptoms:  ADRIAN  Pharmacy name and phone #:  ADRIAN  Best Call Back Number: [phone]  Additional Information: Patient is calling regarding his procedure. He understood that he is having the procedure on 05/28/18 but it is not on the schedule. Please call patient.

## 2018-05-22 NOTE — TELEPHONE ENCOUNTER
Spoke with patient, info given for surgery and number given to pre-op to call on thurs, patient verbally understood.

## 2018-05-22 NOTE — PROGRESS NOTES
Subjective:      Patient ID: Irais Mcneil is a 67 y.o. male.    Chief Complaint: Follow-up      HPI  ([facility])    History of migraines, CKD stage 2, hyperlipidemia, HTN.     History of spinal surgery in 2000- had fusion at L4-L5 ([facility]). He had postop infection, he had a washout done and he was on IV antibiotics. Pain improved until last year. Fusion looks good at L4-L5, he has adjacent segment disease at L3-L4 with moderate stenosis and mild/left moderate right foraminal stenosis. Also with mild central stenosis and varying foraminal stenosis L1-L2, L2-L3, and L5-S1. Pain generator is likely L3-L4.     Started on neurontin and flexeril at his last visit. He had scoliosis XRs done to review today. He also had bilateral L3-L4 TF LAKEISHA with Dr. Person on 4/25/18.     He had at least 50% initial improvement with injection for a few weeks. Pain is no longer constant, but he still has intermittent LBP with bilateral anterior thigh pain and posterior pain behind the knees. LBP > leg pain. Right leg pain > left leg pain. Pain is worse with sitting and driving. He rates his pain as a 5 on a scale of 1-10. Pain is aching and sharp. No numbness, tingling, or weakness in his legs. Not sure if neurontin and flexeril have helped. Neurontin makes him sleepy.       Review of Systems   Constitution: Negative for chills, fever, night sweats and weight gain.   Gastrointestinal: Negative for bowel incontinence, nausea and vomiting.   Genitourinary: Negative for bladder incontinence.   Neurological: Negative for disturbances in coordination and loss of balance.           Objective:        General: Irais is well-developed, well-nourished, appears stated age, in no acute distress, alert and oriented to time, place and person.     Ortho/SPM Exam     Patient sits comfortably in the exam room and answers questions appropriately. Grossly patient is able to move bilateral LEs without difficulty. Ambulates normally.       XRAY  INTERPRETATION:  Full length scoliosis x-rays (AP/lateral) dated 4/10/18 are personally reviewed and show 23 degree right sided curve T11-L4, lateral listhesis to right L3-L4, flat back.     Above imaging reviewed with Dr. Diez.         Assessment:       1. Scoliosis, unspecified scoliosis type, unspecified spinal region    2. History of lumbar fusion    3. Spondylosis without myelopathy    4. DDD (degenerative disc disease), lumbosacral    5. Spinal stenosis of lumbar region without neurogenic claudication    6. Thoracic and lumbosacral neuritis    7. Acquired spondylolisthesis    8. Bilateral low back pain with bilateral sciatica, unspecified chronicity           Plan:       Orders Placed This Encounter    Procedure Order to [facility]    Ambulatory referral to Physical Therapy - Lumbar       History of spinal surgery in 2000- had fusion at L4-L5 ([facility]). He had postop infection, he had a washout done and he was on IV antibiotics. Pain improved until last year. Some improvement with recent lumbar LAKEISHA, however he still has intermittent LBP with bilateral anterior thigh pain and posterior pain behind the knees. LBP > leg pain. Right leg pain > left leg pain. Fusion looks good at L4-L5, he has adjacent segment disease at L3-L4 with moderate stenosis and mild/left moderate right foraminal stenosis. Also with mild central stenosis and varying foraminal stenosis L1-L2, L2-L3, and L5-S1. Pain generator is likely L3-L4. He has mild scoliosis and flat back deformity as well. No sagittal imbalance. Above scoliosis XRs reviewed with patient and his wife.     Dr. Diez was in to discuss treatment options with patient and following plan made:     - Set up for repeat bilateral L3-L4 TF LAKEISHA with pain management. May try facet injections as well.   - Of note, he has prostate biopsy scheduled for 5/28/18 and starts ceftin and cipro on 5/27/18.   - Having side effects (sleepy) with neurontin tid. He was go back  down to 1 q hs. If he tolerates this then he can go to 2 q hs.   - Continue prn flexeril.   - Dr. Diez wants patient to see Dr. Peña for a second opinion regarding his scoliosis and flat back deformity.     Follow-up: Follow-up in about 2 months (around 7/22/2018). If there are any questions prior to this, the patient was instructed to contact the office.

## 2018-05-23 ENCOUNTER — PATIENT MESSAGE (OUTPATIENT)
Dept: ADMINISTRATIVE | Facility: OTHER | Age: 68
End: 2018-05-23

## 2018-05-23 DIAGNOSIS — R97.20 ELEVATED PSA: ICD-10-CM

## 2018-05-23 DIAGNOSIS — R97.20 ELEVATED PROSTATE SPECIFIC ANTIGEN (PSA): Primary | ICD-10-CM

## 2018-05-23 NOTE — H&P
Subjective:       Patient ID: Irais Mcneil is a 67 y.o. male.    Chief Complaint: Elevated PSA of 8.1 on 4/11/2018 compared to 6.1 in October 2017.  MRI of the prostate revealed a high probability of 5 out of 5 of prostatic carcinoma.  He had undergone a prior prostate ultrasound biopsy in 2015 which was negative for malignancy.    Past Medical History:   Diagnosis Date    Back pain     Elevated PSA     Headache(784.0)     Hyperlipidemia     Hypertension     Other and unspecified hyperlipidemia     Renal disease     Stage II     Past Surgical History:   Procedure Laterality Date    BACK SURGERY      CATARACT EXTRACTION W/  INTRAOCULAR LENS IMPLANT Left 10/06/2008    yag 7/10/13    CATARACT EXTRACTION W/  INTRAOCULAR LENS IMPLANT Right 08/18/2008    yag 7/10/13    SHOULDER ARTHROSCOPY  2/8/12    rt shoulder     Family History   Problem Relation Age of Onset    Stroke Sister     Diabetes Sister     Hypertension Sister     Stroke Brother     Hypertension Brother     Hypertension Mother      Social History     Social History    Marital status:      Spouse name: N/A    Number of children: N/A    Years of education: N/A     Social History Main Topics    Smoking status: Never Smoker    Smokeless tobacco: Never Used    Alcohol use No    Drug use: No    Sexual activity: Not on file     Other Topics Concern    Not on file     Social History Narrative    No narrative on file       Current Outpatient Prescriptions   Medication Sig Dispense Refill    amLODIPine (NORVASC) 10 MG tablet TAKE 1 TABLET (10 MG TOTAL) BY MOUTH ONCE DAILY. 30 tablet 5    aspirin (ECOTRIN) 81 MG EC tablet Take by mouth.      atorvastatin (LIPITOR) 20 MG tablet TAKE 1 TABLET BY MOUTH EVERY EVENING. 30 tablet 2    cefUROXime (CEFTIN) 500 MG tablet Take 1 tablet (500 mg total) by mouth 2 (two) times daily. 14 tablet 0    ciprofloxacin HCl (CIPRO) 500 MG tablet Take 1 tablet (500 mg total) by mouth 2 (two) times  daily. 14 tablet 0    cyclobenzaprine (FLEXERIL) 10 MG tablet Take 1 tablet (10 mg total) by mouth 3 (three) times daily as needed for Muscle spasms. 90 tablet 0    folic acid (FOLVITE) 1 MG tablet TAKE 1 TABLET BY MOUTH EVERY DAY 90 tablet 0    gabapentin (NEURONTIN) 300 MG capsule 1 po q hs x 3 days, then 1 po bid x 3 days, then 1 po tid. 90 capsule 2    hydrocortisone 2.5 % cream Apply topically 2 (two) times daily. 28 g 1    topiramate (TOPAMAX) 25 MG tablet TAKE 1 TABLET (25 MG TOTAL) BY MOUTH ONCE DAILY. 90 tablet 1    traMADol (ULTRAM) 50 mg tablet TAKE 1-2 TABLETS EVERY 6-8 HOURS AS NEEDED FOR PAIN 30 tablet 1    valsartan (DIOVAN) 40 MG tablet Take 40 mg by mouth once daily.  3     No current facility-administered medications for this visit.      Review of patient's allergies indicates:  No Known Allergies    Review of Systems   All other systems reviewed and are negative.      Objective:      There were no vitals filed for this visit.  Physical Exam   Cardiovascular: Normal rate.    Pulmonary/Chest: Effort normal.   Abdominal: Soft.   Genitourinary: Penis normal.   Genitourinary Comments: Enlarged firm prostate            Assessment:       1. Elevated prostate specific antigen (PSA)    2. Elevated PSA        Plan:       Transrectal ultrasound biopsy of the prostate

## 2018-05-24 ENCOUNTER — TELEPHONE (OUTPATIENT)
Dept: PAIN MEDICINE | Facility: CLINIC | Age: 68
End: 2018-05-24

## 2018-05-24 NOTE — TELEPHONE ENCOUNTER
----- Message from Freda Yen sent at 2018  2:04 PM CDT -----  Please schedule with Dr. Person , pt. Will like procedure  he's starting a new job.         Patient Name: VANGIE CISNEROS([id number])  Sex: Male  : 1950      PCP: KASEY MALIK    Center: [facility]     Level of Service:60499     [place] OFFICE/OUTPT VISIT,NEW,LEVL IV    Types of orders made on 04/10/2018: Imaging, Medications, Procedures    Order Date:4/10/2018  Ordering User:CARLEY ROBB [[id number]]  Encounter Provider:Carley Robb PA-C [[id number]]  Authorizing Provider: Carley Robb PA-C [[id number]]  Supervising Provider:JUSTINO MARAVILLA [[id number]]  Type of Supervision:Supervision Required  Department:[facility][[id number]]    Common Order Information  Procedure -> Epidural - Lumbar Transforaminal (Specify level in Comments) Cmt:             bilateral L3-L4 TF LAKEISHA     Performing Physician -> Felix Person MD     Pre-op Diagnosis -> Spinal stenosis     Order Specific Information  Order: Procedure Order to [facility] [Custom: [id number]]  Order #:          [id number]: 1 FUTURE    Priority: Routine  Class: Hospital Performed    Future Order Information      Expires on:04/10/2019              Associated Diagnoses      Z98.1 History of lumbar fusion      M19.90 Spondylosis without myelopathy      M51.37 DDD (degenerative disc disease), lumbosacral      M48.061 Spinal stenosis of lumbar region without neurogenic claudication      M54.14, M54.17 Thoracic and lumbosacral neuritis      M43.10 Acquired spondylolisthesis      M54.42, M54.41 Bilateral low back pain with bilateral sciatica, unspecified       chronicity      Is patient on anti-coagulants? -> No           Priority: Routine  Class: Hospital Performed    Future Order Information      Expires on:04/10/2019              Associated Diagnoses      Z98.1 History of lumbar fusion      M19.90 Spondylosis without myelopathy      M51.37 DDD  (degenerative disc disease), lumbosacral      M48.061 Spinal stenosis of lumbar region without neurogenic claudication      M54.14, M54.17 Thoracic and lumbosacral neuritis      M43.10 Acquired spondylolisthesis      M54.42, M54.41 Bilateral low back pain with bilateral sciatica, unspecified       chronicity      Procedure -> Epidural - Lumbar Transforaminal (Specify level in Comments)                 Cmt: bilateral L3-L4 TF LAKEISHA         Performing Physician -> Felix Person MD         Is patient on anti-coagulants? -> No         Pre-op Diagnosis -> Spinal stenosis          (Order )

## 2018-05-24 NOTE — TELEPHONE ENCOUNTER
Left message for patient to contact the office regarding scheduling procedure. No answer at this time.

## 2018-05-24 NOTE — TELEPHONE ENCOUNTER
----- Message from Gayle Frazier sent at 5/24/2018  4:07 PM CDT -----  Contact: [phone] self  Patient called in returning your call. Please advise.

## 2018-05-25 ENCOUNTER — HOSPITAL ENCOUNTER (OUTPATIENT)
Dept: PREADMISSION TESTING | Facility: HOSPITAL | Age: 68
Discharge: HOME | End: 2018-05-25
Attending: UROLOGY
Payer: COMMERCIAL

## 2018-05-25 ENCOUNTER — TELEPHONE (OUTPATIENT)
Dept: PAIN MEDICINE | Facility: CLINIC | Age: 68
End: 2018-05-25

## 2018-05-25 VITALS — HEIGHT: 69 IN | WEIGHT: 158 LBS | BODY MASS INDEX: 23.4 KG/M2

## 2018-05-25 PROCEDURE — 99900104 DSU ONLY-NO CHARGE-EA ADD'L HR (STAT)

## 2018-05-25 PROCEDURE — 99900103 DSU ONLY-NO CHARGE-INITIAL HR (STAT)

## 2018-05-25 NOTE — TELEPHONE ENCOUNTER
Spoke with patient regarding scheduling procedure. Patient was offered 6/12/18 for the next available procedure date for Dr. Person. Patient stated that he could not do that date and will call his neurosurgeon to see if he can schedule something with them.

## 2018-05-25 NOTE — TELEPHONE ENCOUNTER
Patient spoke to nurse this morning, this call came after the note made in the system. Message is being sent to Nurse at [facility] to address patient concerns.

## 2018-05-25 NOTE — TELEPHONE ENCOUNTER
----- Message from Syl Aldridge sent at 5/25/2018  8:39 AM CDT -----  Contact: Self/ [phone]  Patient called in returning your call. Please call and advise.

## 2018-05-26 ENCOUNTER — ANESTHESIA EVENT (OUTPATIENT)
Dept: SURGERY | Facility: HOSPITAL | Age: 68
End: 2018-05-26
Payer: COMMERCIAL

## 2018-05-28 ENCOUNTER — ANESTHESIA (OUTPATIENT)
Dept: SURGERY | Facility: HOSPITAL | Age: 68
End: 2018-05-28
Payer: COMMERCIAL

## 2018-05-28 ENCOUNTER — SURGERY (OUTPATIENT)
Age: 68
End: 2018-05-28

## 2018-05-28 ENCOUNTER — HOSPITAL ENCOUNTER (OUTPATIENT)
Facility: HOSPITAL | Age: 68
Discharge: HOME | End: 2018-05-28
Attending: UROLOGY | Admitting: UROLOGY
Payer: COMMERCIAL

## 2018-05-28 VITALS
WEIGHT: 158 LBS | RESPIRATION RATE: 18 BRPM | TEMPERATURE: 98 F | OXYGEN SATURATION: 99 % | HEART RATE: 46 BPM | BODY MASS INDEX: 23.33 KG/M2 | DIASTOLIC BLOOD PRESSURE: 89 MMHG | SYSTOLIC BLOOD PRESSURE: 136 MMHG

## 2018-05-28 DIAGNOSIS — R97.20 ELEVATED PROSTATE SPECIFIC ANTIGEN (PSA): ICD-10-CM

## 2018-05-28 DIAGNOSIS — R97.20 ELEVATED PSA: ICD-10-CM

## 2018-05-28 PROCEDURE — 71000015 HC POSTOP RECOV 1ST HR: Performed by: UROLOGY

## 2018-05-28 PROCEDURE — 36000705 HC OR TIME LEV I EA ADD 15 MIN: Performed by: UROLOGY

## 2018-05-28 PROCEDURE — 76942 ECHO GUIDE FOR BIOPSY: CPT | Mod: 26,59,, | Performed by: UROLOGY

## 2018-05-28 PROCEDURE — 71000033 HC RECOVERY, INTIAL HOUR: Performed by: UROLOGY

## 2018-05-28 PROCEDURE — 25000003 PHARM REV CODE 250: Performed by: UROLOGY

## 2018-05-28 PROCEDURE — D9220A PRA ANESTHESIA: Mod: CRNA,,, | Performed by: NURSE ANESTHETIST, CERTIFIED REGISTERED

## 2018-05-28 PROCEDURE — 37000008 HC ANESTHESIA 1ST 15 MINUTES: Performed by: UROLOGY

## 2018-05-28 PROCEDURE — 36000704 HC OR TIME LEV I 1ST 15 MIN: Performed by: UROLOGY

## 2018-05-28 PROCEDURE — 25000003 PHARM REV CODE 250: Performed by: ANESTHESIOLOGY

## 2018-05-28 PROCEDURE — 88305 TISSUE EXAM BY PATHOLOGIST: CPT | Performed by: PATHOLOGY

## 2018-05-28 PROCEDURE — 55700 PR BIOPSY OF PROSTATE,NEEDLE/PUNCH: CPT | Mod: ,,, | Performed by: UROLOGY

## 2018-05-28 PROCEDURE — 63600175 PHARM REV CODE 636 W HCPCS: Performed by: NURSE ANESTHETIST, CERTIFIED REGISTERED

## 2018-05-28 PROCEDURE — 99900104 DSU ONLY-NO CHARGE-EA ADD'L HR (STAT): Performed by: UROLOGY

## 2018-05-28 PROCEDURE — 37000009 HC ANESTHESIA EA ADD 15 MINS: Performed by: UROLOGY

## 2018-05-28 PROCEDURE — D9220A PRA ANESTHESIA: Mod: ANES,,, | Performed by: ANESTHESIOLOGY

## 2018-05-28 PROCEDURE — 99900103 DSU ONLY-NO CHARGE-INITIAL HR (STAT): Performed by: UROLOGY

## 2018-05-28 PROCEDURE — 63600175 PHARM REV CODE 636 W HCPCS: Performed by: UROLOGY

## 2018-05-28 PROCEDURE — 76872 US TRANSRECTAL: CPT | Mod: 26,,, | Performed by: UROLOGY

## 2018-05-28 PROCEDURE — 88305 TISSUE EXAM BY PATHOLOGIST: CPT | Mod: 26,,, | Performed by: PATHOLOGY

## 2018-05-28 RX ORDER — HYDROCODONE BITARTRATE AND ACETAMINOPHEN 5; 325 MG/1; MG/1
1 TABLET ORAL EVERY 4 HOURS PRN
Status: DISCONTINUED | OUTPATIENT
Start: 2018-05-28 | End: 2018-05-28 | Stop reason: HOSPADM

## 2018-05-28 RX ORDER — MIDAZOLAM HYDROCHLORIDE 1 MG/ML
INJECTION, SOLUTION INTRAMUSCULAR; INTRAVENOUS
Status: DISCONTINUED | OUTPATIENT
Start: 2018-05-28 | End: 2018-05-28

## 2018-05-28 RX ORDER — PHENAZOPYRIDINE HYDROCHLORIDE 100 MG/1
200 TABLET, FILM COATED ORAL
Qty: 20 TABLET | Refills: 0 | Status: SHIPPED | OUTPATIENT
Start: 2018-05-28 | End: 2018-06-06 | Stop reason: ALTCHOICE

## 2018-05-28 RX ORDER — ACETAMINOPHEN 500 MG
500 TABLET ORAL EVERY 6 HOURS PRN
COMMUNITY

## 2018-05-28 RX ORDER — OXYCODONE HYDROCHLORIDE 5 MG/1
5 TABLET ORAL ONCE AS NEEDED
Status: COMPLETED | OUTPATIENT
Start: 2018-05-28 | End: 2018-05-28

## 2018-05-28 RX ORDER — DEXAMETHASONE SODIUM PHOSPHATE 4 MG/ML
INJECTION, SOLUTION INTRA-ARTICULAR; INTRALESIONAL; INTRAMUSCULAR; INTRAVENOUS; SOFT TISSUE
Status: DISCONTINUED | OUTPATIENT
Start: 2018-05-28 | End: 2018-05-28

## 2018-05-28 RX ORDER — FENTANYL CITRATE 50 UG/ML
25 INJECTION, SOLUTION INTRAMUSCULAR; INTRAVENOUS EVERY 5 MIN PRN
Status: DISCONTINUED | OUTPATIENT
Start: 2018-05-28 | End: 2018-05-28 | Stop reason: HOSPADM

## 2018-05-28 RX ORDER — HYDROCODONE BITARTRATE AND ACETAMINOPHEN 5; 325 MG/1; MG/1
1 TABLET ORAL
Qty: 20 TABLET | Refills: 0 | Status: SHIPPED | OUTPATIENT
Start: 2018-05-28 | End: 2018-06-06 | Stop reason: ALTCHOICE

## 2018-05-28 RX ORDER — LIDOCAINE HYDROCHLORIDE 10 MG/ML
1 INJECTION, SOLUTION EPIDURAL; INFILTRATION; INTRACAUDAL; PERINEURAL ONCE
Status: DISCONTINUED | OUTPATIENT
Start: 2018-05-28 | End: 2018-05-28 | Stop reason: HOSPADM

## 2018-05-28 RX ORDER — GENTAMICIN SULFATE 80 MG/100ML
80 INJECTION, SOLUTION INTRAVENOUS
Status: COMPLETED | OUTPATIENT
Start: 2018-05-28 | End: 2018-05-28

## 2018-05-28 RX ORDER — SODIUM CHLORIDE, SODIUM LACTATE, POTASSIUM CHLORIDE, CALCIUM CHLORIDE 600; 310; 30; 20 MG/100ML; MG/100ML; MG/100ML; MG/100ML
10 INJECTION, SOLUTION INTRAVENOUS CONTINUOUS
Status: DISCONTINUED | OUTPATIENT
Start: 2018-05-29 | End: 2018-05-28 | Stop reason: HOSPADM

## 2018-05-28 RX ORDER — FENTANYL CITRATE 50 UG/ML
INJECTION, SOLUTION INTRAMUSCULAR; INTRAVENOUS
Status: DISCONTINUED | OUTPATIENT
Start: 2018-05-28 | End: 2018-05-28

## 2018-05-28 RX ORDER — PROPOFOL 10 MG/ML
VIAL (ML) INTRAVENOUS
Status: DISCONTINUED | OUTPATIENT
Start: 2018-05-28 | End: 2018-05-28

## 2018-05-28 RX ORDER — ONDANSETRON HYDROCHLORIDE 2 MG/ML
INJECTION, SOLUTION INTRAMUSCULAR; INTRAVENOUS
Status: DISCONTINUED | OUTPATIENT
Start: 2018-05-28 | End: 2018-05-28

## 2018-05-28 RX ORDER — SODIUM CHLORIDE 0.9 % (FLUSH) 0.9 %
3 SYRINGE (ML) INJECTION EVERY 8 HOURS
Status: DISCONTINUED | OUTPATIENT
Start: 2018-05-28 | End: 2018-05-28 | Stop reason: HOSPADM

## 2018-05-28 RX ORDER — LIDOCAINE HCL/PF 100 MG/5ML
SYRINGE (ML) INTRAVENOUS
Status: DISCONTINUED | OUTPATIENT
Start: 2018-05-28 | End: 2018-05-28

## 2018-05-28 RX ORDER — PHENAZOPYRIDINE HYDROCHLORIDE 200 MG/1
200 TABLET, FILM COATED ORAL ONCE
Status: COMPLETED | OUTPATIENT
Start: 2018-05-28 | End: 2018-05-28

## 2018-05-28 RX ADMIN — DEXAMETHASONE SODIUM PHOSPHATE 4 MG: 4 INJECTION, SOLUTION INTRAMUSCULAR; INTRAVENOUS at 04:05

## 2018-05-28 RX ADMIN — FENTANYL CITRATE 50 MCG: 50 INJECTION, SOLUTION INTRAMUSCULAR; INTRAVENOUS at 04:05

## 2018-05-28 RX ADMIN — MIDAZOLAM 2 MG: 1 INJECTION INTRAMUSCULAR; INTRAVENOUS at 04:05

## 2018-05-28 RX ADMIN — OXYCODONE HYDROCHLORIDE 5 MG: 5 TABLET ORAL at 05:05

## 2018-05-28 RX ADMIN — SODIUM CHLORIDE, SODIUM LACTATE, POTASSIUM CHLORIDE, AND CALCIUM CHLORIDE 10 ML/HR: .6; .31; .03; .02 INJECTION, SOLUTION INTRAVENOUS at 04:05

## 2018-05-28 RX ADMIN — GENTAMICIN SULFATE 80 MG: 80 INJECTION, SOLUTION INTRAVENOUS at 04:05

## 2018-05-28 RX ADMIN — PROPOFOL 150 MG: 10 INJECTION, EMULSION INTRAVENOUS at 04:05

## 2018-05-28 RX ADMIN — PHENAZOPYRIDINE HYDROCHLORIDE 200 MG: 200 TABLET ORAL at 05:05

## 2018-05-28 RX ADMIN — ONDANSETRON 4 MG: 2 INJECTION, SOLUTION INTRAMUSCULAR; INTRAVENOUS at 04:05

## 2018-05-28 RX ADMIN — LIDOCAINE HYDROCHLORIDE 75 MG: 20 INJECTION, SOLUTION INTRAVENOUS at 04:05

## 2018-05-28 RX ADMIN — SODIUM CHLORIDE, SODIUM LACTATE, POTASSIUM CHLORIDE, AND CALCIUM CHLORIDE 10 ML/HR: .6; .31; .03; .02 INJECTION, SOLUTION INTRAVENOUS at 12:05

## 2018-05-28 NOTE — DISCHARGE INSTRUCTIONS
"Discharge Instructions: After Your Surgery/Procedure  Youve just had surgery. During surgery you were given medicine called anesthesia to keep you relaxed and free of pain. After surgery you may have some pain or nausea. This is common. Here are some tips for feeling better and getting well after surgery.     Stay on schedule with your medication.   Going home  Your doctor or nurse will show you how to take care of yourself when you go home. He or she will also answer your questions. Have an adult family member or friend drive you home.      For your safety we recommend these precaution for the first 24 hours after your procedure:  · Do not drive or use heavy equipment.  · Do not make important decisions or sign legal papers.  · Do not drink alcohol.  · Have someone stay with you, if needed. He or she can watch for problems and help keep you safe.  · Your concentration, balance, coordination, and judgement may be impaired for many hours after anesthesia.  Use caution when ambulating or standing up.     · You may feel weak and "washed out" after anesthesia and surgery.      Subtle residual effects of general anesthesia or sedation with regional / local anesthesia can last more than 24 hours.  Rest for the remainder of the day or longer if your Doctor/Surgeon has advised you to do so.  Although you may feel normal within the first 24 hours, your reflexes and mental ability may be impaired without you realizing it.  You may feel dizzy, lightheaded or sleepy for 24 hours or longer.      Be sure to go to all follow-up visits with your doctor. And rest after your surgery for as long as your doctor tells you to.  Coping with pain  If you have pain after surgery, pain medicine will help you feel better. Take it as told, before pain becomes severe. Also, ask your doctor or pharmacist about other ways to control pain. This might be with heat, ice, or relaxation. And follow any other instructions your surgeon or nurse gives " you.  Tips for taking pain medicine  To get the best relief possible, remember these points:  · Pain medicines can upset your stomach. Taking them with a little food may help.  · Most pain relievers taken by mouth need at least 20 to 30 minutes to start to work.  · Taking medicine on a schedule can help you remember to take it. Try to time your medicine so that you can take it before starting an activity. This might be before you get dressed, go for a walk, or sit down for dinner.  · Constipation is a common side effect of pain medicines. Call your doctor before taking any medicines such as laxatives or stool softeners to help ease constipation. Also ask if you should skip any foods. Drinking lots of fluids and eating foods such as fruits and vegetables that are high in fiber can also help. Remember, do not take laxatives unless your surgeon has prescribed them.  · Drinking alcohol and taking pain medicine can cause dizziness and slow your breathing. It can even be deadly. Do not drink alcohol while taking pain medicine.  · Pain medicine can make you react more slowly to things. Do not drive or run machinery while taking pain medicine.  Your health care provider may tell you to take acetaminophen to help ease your pain. Ask him or her how much you are supposed to take each day. Acetaminophen or other pain relievers may interact with your prescription medicines or other over-the-counter (OTC) drugs. Some prescription medicines have acetaminophen and other ingredients. Using both prescription and OTC acetaminophen for pain can cause you to overdose. Read the labels on your OTC medicines with care. This will help you to clearly know the list of ingredients, how much to take, and any warnings. It may also help you not take too much acetaminophen. If you have questions or do not understand the information, ask your pharmacist or health care provider to explain it to you before you take the OTC medicine.  Managing  nausea  Some people have an upset stomach after surgery. This is often because of anesthesia, pain, or pain medicine, or the stress of surgery. These tips will help you handle nausea and eat healthy foods as you get better. If you were on a special food plan before surgery, ask your doctor if you should follow it while you get better. These tips may help:  · Do not push yourself to eat. Your body will tell you when to eat and how much.  · Start off with clear liquids and soup. They are easier to digest.  · Next try semi-solid foods, such as mashed potatoes, applesauce, and gelatin, as you feel ready.  · Slowly move to solid foods. Dont eat fatty, rich, or spicy foods at first.  · Do not force yourself to have 3 large meals a day. Instead eat smaller amounts more often.  · Take pain medicines with a small amount of solid food, such as crackers or toast, to avoid nausea.     Call your surgeon if  · You still have pain an hour after taking medicine. The medicine may not be strong enough.  · You feel too sleepy, dizzy, or groggy. The medicine may be too strong.  · You have side effects like nausea, vomiting, or skin changes, such as rash, itching, or hives.       If you have obstructive sleep apnea  You were given anesthesia medicine during surgery to keep you comfortable and free of pain. After surgery, you may have more apnea spells because of this medicine and other medicines you were given. The spells may last longer than usual.   At home:  · Keep using the continuous positive airway pressure (CPAP) device when you sleep. Unless your health care provider tells you not to, use it when you sleep, day or night. CPAP is a common device used to treat obstructive sleep apnea.  · Talk with your provider before taking any pain medicine, muscle relaxants, or sedatives. Your provider will tell you about the possible dangers of taking these medicines.  © 2031-3313 [employer]. [address] [place] [address]  [place] [address]. All rights reserved. This information is not intended as a substitute for professional medical care. Always follow your healthcare professional's instructions.    General Information:    1.  Do not drink alcoholic beverages including beer for 24 hours or as long as you are on pain medication..  2.  Do not drive a motor vehicle, operate machinery or power tools, or signs legal papers for 24 hours or as long as you are on pain medication.   3.  You may experience light-headedness, dizziness, and sleepiness following surgery. Please do not stay alone. A responsible adult should be with you for this 24 hour period.  4.  Go home and rest.    5. Progress slowly to a normal diet unless instructed.  Otherwise, begin with liquids such as soft drinks, then soup and crackers working up to solid foods. Drink plenty of nonalcoholic fluids.  6.  Certain anesthetics and pain medications produce nausea and vomiting in certain       individuals. If nausea becomes a problem at home, call you doctor.    7. A nurse will be calling you sometime after surgery. Do not be alarmed. This is our way of finding out how you are doing.    8. Several times every hour while you are awake, take 2-3 deep breaths and cough. If you had stomach surgery hold a pillow or rolled towel firmly against your stomach before you cough. This will help with any pain the cough might cause.  9. Several times every hour while you are awake, pump and flex your feet 5-6 times and do foot circles. This will help prevent blood clots.    10.Call your doctor for severe pain, bleeding, fever, or signs or symptoms of infection (pain, swelling, redness, foul odor, drainage).          Transrectal Ultrasound and Biopsy    A transrectal ultrasound is an imaging test. It uses sound waves to create pictures of a mans prostate gland. Your prostate gland is in front of your rectum. For this test, a special probe (transducer) is placed directly into your rectum. During  the test, tissue samples (a biopsy) may also be taken. The test is done by a specially trained technologist called a sonographer.  Getting ready for your test  · You may be asked to clear your bowel before the test. This may be done by injecting liquid into your rectum (an enema). Or it can be done by drinking a special liquid.  · You may be asked not to eat or drink anything after midnight the night before the test.  · Tell your health care provider about any medicines, herbs, or supplements you are taking. This includes any over-the-counter medicines such as aspirin or ibuprofen.  · Answer any questions your provider has about your medical history. This will help your provider tailor the test to your health needs.  During your test  · You may be asked to change into a gown. You will then lie on your side on an exam table, with your knees bent.  · The test is done with a hand-held probe. This is a short, slender marina. It has a sterile, disposable cover. It is also greased (lubricated) with some gel. It is then gently placed inside your rectum.  · You will feel pressure from the probe. If you feel pain, let your provider know.  · If a biopsy is taken, it is done using a small probe with a very tiny needle on the end. This needle enters your prostate and removes several tiny samples of tissue. These samples are then sent to a lab to be examined. Any mild pain from the biopsy is usually minor.   After your test  Before leaving, you may need to wait for a short time while the images are reviewed. In most cases, you can go back to your normal routine after the test. If you had a biopsy, you may see some blood in your urine, sperm, or stool for a day or so. This is normal. Your health care provider will let you know when your test results are ready.  In some cases, a diagnosis cant be made from the tissue sample that was taken. If this happens, your provider will talk with you about whether you may need another biopsy.  Or you may need a different procedure.  When to call your health care provider  Call your provider if you have:  · Very bloody urine or stool  · A fever lasting 24 to 48 hours  · Any other symptoms that your provider asks you to report, based on your medical condition   Date Last Reviewed: 6/19/2015  © 2199-1552 [employer]. [address] [place] [address]. All rights reserved. This information is not intended as a substitute for professional medical care. Always follow your healthcare professional's instructions.              Post op instructions for prevention of DVT  What is deep vein thrombosis?  Deep vein thrombosis (DVT) is the medical term for blood clots in the deep veins of the leg.  These blood clots can be dangerous.  A DVT can block a blood vessel and keep blood from getting where it needs to go.  Another problem is that the clot can travel to other parts of the body such as the lungs.  A clot that travels to the lungs is called a pulmonary embolus (PE) and can cause serious problems with breathing which can lead to death.  Am I at risk for DVT/PE?  If you are not very active, you are at risk of DVT.  Anyone confined to bed, sitting for long periods of time, recovering from surgery, etc. increases the risk of DVT.  Other risk factors are cancer diagnosis, certain medications, estrogen replacement in any form,older age, obesity, pregnancy, smoking, history of clotting disorders, and dehydration.  How will I know if I have a DVT?   Swelling in the lower leg   Pain   Warmth, redness, hardness or bulging of the vein  If you have any of these symptoms, call your doctors office right away.  Some people will not have any symptoms until the clot moves to the lungs.  What are the symptoms of a PE?   Panting, shortness of breath, or trouble breathing   Sharp, knife-like chest pain when you breathe   Coughing or coughing up blood   Rapid heartbeat  If you have any of these symptoms  or get worse quickly, call 911 for emergency treatment.  How can I prevent a DVT?   Avoid long periods of inactivity and dont cross your legs--get up and walk around every hour or so.   Stay active--walking after surgery is highly encouraged.  This means you should get out of the house and walk in the neighborhood.  Going up and down stairs will not impair healing (unless advised against such activity by your doctor).     Drink plenty of noncaffeinated, nonalcoholic fluids each day to prevent dehydration.   Wear special support stockings, if they have been advised by your doctor.   If you travel, stop at least once an hour and walk around.   Avoid smoking (assistance with stopping is available through your healthcare provider)  Always notify your doctor if you are not able to follow the post operative instructions that are given to you at the time of discharge.  It may be necessary to prescribe one of the medications available to prevent DVT.      Using Opioids for Pain Management     Your doctor has given instructions for you to take an opioid.  This is a drug for bad pain.  It helps control pain without causing bleeding and kidney problems.  Common opioid names are morphine, hydromorphone, oxycodone, and methadone. These drugs are called narcotics.    There are several safety concerns you need to know.     · It is against the law to give or sell this drug to another person.  You must keep this medicine safely locked.    · You may have side effects from taking this medication.  These include nausea, itching, sweating, sleepiness, a change in your ability to breathe, and depression.  · Do not take alcohol or sleeping pills opioids.    · Long-term opoid use may no longer giver you relief from pain.  It can cause you stomach pain, mental anxiety, and headaches.  Long-term opoid use can potentially lead to unlawful street drug abuse and reduce your ability to stay employed.    · Your body may become opioid  tolerant if you need to take more to get relief.    · You must stop taking opioids if you begin having more pain as a result of the medicine.    · Opioid withdrawal occurs when you have to stop taking the drug.  It can cause you to have nausea, vomiting, diarrhea, stomach pain, anxiety, and dilated pupils in your eyes. This condition means you are opioid dependent.    · Addiction is a drug induced brain disease. It means there are changes in how your brain is working.  Children, teens, and young adults under 25 years old are more likely to get addicted to opioids.      · Addiction can happen with repeated opioid use.  It does not happen with short-term use of two weeks or less.       For more information, please speak with your doctor or pharmacist.        We hope your stay was comfortable as you heal now, mend and rest.    For we have enjoyed taking care of you by giving your our best.    And as you get better, by regaining your health and strength;   We count it as a privilege to have served you and hope your time at [facility] was well spent.      Thank  You!!!

## 2018-05-28 NOTE — ANESTHESIA POSTPROCEDURE EVALUATION
Anesthesia Post Evaluation    Patient: Irais Mcneil    Procedure(s) Performed: Procedure(s) (LRB):  TRANSRECTAL ULTRASOUND GUIDED PROSTATE BIOPSY (N/A)    Final Anesthesia Type: general  Patient location during evaluation: PACU  Patient participation: Yes- Able to Participate  Level of consciousness: awake and alert and oriented  Post-procedure vital signs: reviewed and stable  Pain management: adequate  Airway patency: patent  PONV status at discharge: No PONV  Anesthetic complications: no      Cardiovascular status: hemodynamically stable and blood pressure returned to baseline  Respiratory status: unassisted, spontaneous ventilation and room air  Hydration status: euvolemic  Follow-up not needed.        Visit Vitals  /89   Pulse (!) 46   Temp 36.7 °C (98.1 °F) (Skin)   Resp 18   Wt 71.7 kg (158 lb)   SpO2 99%   BMI 23.33 kg/m²       Pain/Seth Score: Pain Assessment Performed: Yes (5/28/2018  6:10 PM)  Presence of Pain: denies (5/28/2018  6:10 PM)  Pain Rating Prior to Med Admin: 2 (5/28/2018  5:18 PM)  Seth Score: 10 (5/28/2018  5:47 PM)

## 2018-05-28 NOTE — PLAN OF CARE
Patient voided and drank juice, discharge instructions provided to patient and son, signs and symptoms to report were provided and all questions were answered, IV removed, prescription provided and appointment completed. Criteria met for discharge home

## 2018-05-28 NOTE — ANESTHESIA PREPROCEDURE EVALUATION
05/28/2018  Irais Mcneil is a 67 y.o., male.    Anesthesia Evaluation      I have reviewed the Medications.     Review of Systems  Anesthesia Hx:  No problems with previous Anesthesia   Social:  Non-Smoker, No Alcohol Use    Cardiovascular:   Hypertension hyperlipidemia    Pulmonary:  Pulmonary Normal    Renal/:   Chronic Renal Disease, CRI    Hepatic/GI:  Hepatic/GI Normal    Neurological:  Neurology Normal    Endocrine:  Endocrine Normal        Physical Exam  General:  Well nourished    Airway/Jaw/Neck:  Airway Findings: Mouth Opening: Normal Tongue: Normal  General Airway Assessment: Adult, Average  Mallampati: II  Jaw/Neck Findings:  Neck ROM: Normal ROM       Chest/Lungs:  Chest/Lungs Findings: Clear to auscultation, Normal Respiratory Rate     Heart/Vascular:  Heart Findings: Rate: Normal  Rhythm: Regular Rhythm  Sounds: Normal  Heart murmur: negative       Mental Status:  Mental Status Findings:  Cooperative, Alert and Oriented         Anesthesia Plan  Type of Anesthesia, risks & benefits discussed:  Anesthesia Type:  general  Patient's Preference:   Intra-op Monitoring Plan:   Intra-op Monitoring Plan Comments:   Post Op Pain Control Plan:   Post Op Pain Control Plan Comments:   Induction:   IV  Beta Blocker:  Patient is not currently on a Beta-Blocker (No further documentation required).       Informed Consent: Patient understands risks and agrees with Anesthesia plan.  Questions answered. Anesthesia consent signed with patient.  ASA Score: 2     Day of Surgery Review of History & Physical:        Anesthesia Plan Notes: LMA general        Ready For Surgery From Anesthesia Perspective.

## 2018-05-28 NOTE — OP NOTE
TRANSRECTAL PROSTATIC ULTRASOUND, TRANSRECTAL PROSTATE BIOPSY     DATE: 2018    SURGEON: Jeramie Hill MD    ANESTHESIA: General      NAME:Irais Mcneil  : 1950  Diagnosis: Elevated PSA, BPH,PROSTATE NODULE    Current PSA: 8.1    MARIA DEL ROSARIO: Enlarged firm prostate         PROCEDURE DESCRIPTION:  After informed consent was obtained and signed, the patient was brought to the ultrasound suite, and after adequate induction of general anesthesia he was placed in the lithotomy position.  The prostate was examined and measured the findings are as indicated.  US probe was placed into the rectum.  Axial and saggital views were obtained.                        TRANSRECTAL ULTRASOUND  Measurements:   Height:  33.9 mm  Width:  51.6 mm  Length:  47.9 mm  Volume: 43.8 cm3    Seminal vesicles/Ejaculatory Ducts: Normal  Outline/Symmetry of Prostate: WNL  Central Gland/Transition Zone: BPH Hyperechoic areas  Peripheral Zone: WNL  Demarcation btw. Centra and Peripheral Zones: well demarcated  Bladder: Normal  MedianLobe: Normal    BIOPSY:  Biopsies were obtained from both lobes and total of 6 from each side including base, mid gland, apex, anterior, medial and lateral aspects.  Pressure was held for several minutes on each lobe for hemostasis.  Prophylactic antibiotics were given in the form ofGentamycin 80 mg IV.    IMPRESSION:  Suspicion for adenocarcinoma of the prostate    RECOMMENDATION:  1.  Instructed to return to clinic or ED should S/S including fever, chills or the inability to urinate.  2.  Return to clinic in 7-10 days for results and follow up.  3. Continue on Cipro 500 mg po bid and Ceftin 500 mg po bid til completed.

## 2018-05-28 NOTE — BRIEF OP NOTE
Operative Note     SUMMARY     Surgery Date: 5/28/2018     Surgeon(s) and Role:     * Jeramie Hill MD - Primary    Pre-op Diagnosis:  Elevated prostate specific antigen (PSA) [R97.20]    Post-op Diagnosis:  Elevated prostate specific antigen (PSA) [R97.20]    Procedure(s) (LRB):  TRANSRECTAL ULTRASOUND GUIDED PROSTATE BIOPSY (N/A)    Anesthesia: General    Findings/Key Components:  See Op Note      Estimated Blood Loss: * No values recorded between 5/28/2018  4:32 PM and 5/28/2018  4:52 PM *         Specimens     Start     Ordered    05/28/18 1610  Specimen to Pathology - Surgery  Once      05/28/18 1645            Discharge Note      SUMMARY     Admit Date: 5/28/2018    Attending Physician: Jeramie Hill MD     Discharge Physician: Jeramie Hill MD    Discharge Date: 5/28/2018     Final Diagnosis: Elevated prostate specific antigen (PSA) [R97.20]    Hospital Course: uneventful, going home same day    Disposition: Home or Self Care    Patient Instructions:   Current Discharge Medication List      START taking these medications    Details   HYDROcodone-acetaminophen (NORCO) 5-325 mg per tablet Take 1 tablet by mouth every 4 to 6 hours as needed for Pain.  Qty: 20 tablet, Refills: 0      phenazopyridine (PYRIDIUM) 100 MG tablet Take 2 tablets (200 mg total) by mouth 3 (three) times daily with meals.  Qty: 20 tablet, Refills: 0         CONTINUE these medications which have NOT CHANGED    Details   acetaminophen (TYLENOL) 500 MG tablet Take 500 mg by mouth every 6 (six) hours as needed for Pain.      amLODIPine (NORVASC) 10 MG tablet TAKE 1 TABLET (10 MG TOTAL) BY MOUTH ONCE DAILY.  Qty: 30 tablet, Refills: 5    Associated Diagnoses: Essential hypertension      atorvastatin (LIPITOR) 20 MG tablet TAKE 1 TABLET BY MOUTH EVERY EVENING.  Qty: 30 tablet, Refills: 2    Associated Diagnoses: Essential hypertension      cefUROXime (CEFTIN) 500 MG tablet Take 1 tablet (500 mg total) by mouth 2 (two) times daily.  Qty:  14 tablet, Refills: 0      ciprofloxacin HCl (CIPRO) 500 MG tablet Take 1 tablet (500 mg total) by mouth 2 (two) times daily.  Qty: 14 tablet, Refills: 0      cyclobenzaprine (FLEXERIL) 10 MG tablet Take 1 tablet (10 mg total) by mouth 3 (three) times daily as needed for Muscle spasms.  Qty: 90 tablet, Refills: 0      folic acid (FOLVITE) 1 MG tablet TAKE 1 TABLET BY MOUTH EVERY DAY  Qty: 90 tablet, Refills: 0    Associated Diagnoses: Peripheral vascular disease      gabapentin (NEURONTIN) 300 MG capsule 1 po q hs x 3 days, then 1 po bid x 3 days, then 1 po tid.  Qty: 90 capsule, Refills: 2    Associated Diagnoses: History of lumbar fusion; Spondylosis without myelopathy; DDD (degenerative disc disease), lumbosacral; Spinal stenosis of lumbar region without neurogenic claudication; Thoracic and lumbosacral neuritis; Acquired spondylolisthesis; Bilateral low back pain with bilateral sciatica, unspecified chronicity      topiramate (TOPAMAX) 25 MG tablet TAKE 1 TABLET (25 MG TOTAL) BY MOUTH ONCE DAILY.  Qty: 90 tablet, Refills: 1    Associated Diagnoses: Atypical migraine      valsartan (DIOVAN) 40 MG tablet Take 40 mg by mouth nightly.   Refills: 3      aspirin (ECOTRIN) 81 MG EC tablet Take by mouth.      hydrocortisone 2.5 % cream Apply topically 2 (two) times daily.  Qty: 28 g, Refills: 1      traMADol (ULTRAM) 50 mg tablet TAKE 1-2 TABLETS EVERY 6-8 HOURS AS NEEDED FOR PAIN  Qty: 30 tablet, Refills: 1    Comments: Not to exceed 4 additional fills before 08/08/2018.             Discharge Procedure Orders (must include Diet, Follow-up, Activity)  Resume previous diet.  Follow up with PCP as needed.

## 2018-05-28 NOTE — TRANSFER OF CARE
Anesthesia Transfer of Care Note    Patient: Irais Mcneil    Procedure(s) Performed: Procedure(s) (LRB):  TRANSRECTAL ULTRASOUND GUIDED PROSTATE BIOPSY (N/A)    Patient location: PACU    Anesthesia Type: general    Transport from OR: Transported from OR on 2-3 L/min O2 by NC with adequate spontaneous ventilation    Post pain: adequate analgesia    Post assessment: no apparent anesthetic complications and tolerated procedure well    Post vital signs: stable    Level of consciousness: awake, alert and oriented    Nausea/Vomiting: no nausea/vomiting    Complications: none    Transfer of care protocol was followed      Last vitals:   Visit Vitals  /68   Pulse (!) 58   Temp 37 °C (98.6 °F)   Resp 18   Wt 71.7 kg (158 lb)   SpO2 100%   BMI 23.33 kg/m²

## 2018-06-06 ENCOUNTER — OFFICE VISIT (OUTPATIENT)
Dept: UROLOGY | Facility: CLINIC | Age: 68
End: 2018-06-06
Payer: COMMERCIAL

## 2018-06-06 VITALS
TEMPERATURE: 98 F | WEIGHT: 158.06 LBS | RESPIRATION RATE: 18 BRPM | BODY MASS INDEX: 23.41 KG/M2 | SYSTOLIC BLOOD PRESSURE: 131 MMHG | HEIGHT: 69 IN | HEART RATE: 68 BPM | DIASTOLIC BLOOD PRESSURE: 79 MMHG

## 2018-06-06 DIAGNOSIS — R97.20 ELEVATED PROSTATE SPECIFIC ANTIGEN (PSA): Primary | ICD-10-CM

## 2018-06-06 DIAGNOSIS — N42.31 PIN (PROSTATIC INTRAEPITHELIAL NEOPLASIA): ICD-10-CM

## 2018-06-06 PROCEDURE — 3078F DIAST BP <80 MM HG: CPT | Mod: CPTII,S$GLB,, | Performed by: UROLOGY

## 2018-06-06 PROCEDURE — 3075F SYST BP GE 130 - 139MM HG: CPT | Mod: CPTII,S$GLB,, | Performed by: UROLOGY

## 2018-06-06 PROCEDURE — 3008F BODY MASS INDEX DOCD: CPT | Mod: CPTII,S$GLB,, | Performed by: UROLOGY

## 2018-06-06 PROCEDURE — 99213 OFFICE O/P EST LOW 20 MIN: CPT | Mod: S$GLB,,, | Performed by: UROLOGY

## 2018-06-06 PROCEDURE — 99999 PR PBB SHADOW E&M-EST. PATIENT-LVL III: CPT | Mod: PBBFAC,,, | Performed by: UROLOGY

## 2018-06-06 NOTE — PROGRESS NOTES
OFFICE NOTE    AGE:  66.    CHIEF COMPLAINT:  Elevated PSA.    HISTORY OF PRESENT ILLNESS:  This 67-year-old male returns for routine recheck.    He had undergone a transrectal ultrasound and biopsy of the prostate on   05/28/2018 with an elevated PSA of 8.1.  The pathology report revealed a focal   high-grade prostatic intraepithelial neoplasia, but no evidence of any prostatic   malignancy.  It was mentioned to the patient that this could be considered a   precancerous condition, but does not document that he has prostatic cancer.  We   will need to continue to monitor him and it is very likely he will require   repeat prostate ultrasound with biopsy in the future.  He otherwise is doing   well, has recovered well from the procedure.    FINAL IMPRESSION:  Elevated PSA, high-grade PIN.    RECOMMENDATIONS:  Recheck four months with repeat PSA and subsequently we will   need to consider possible repeat prostate ultrasound with biopsy.      PAIGE  dd: 06/06/2018 15:05:53 (CDT)  td: 06/07/2018 00:58:12 (CDT)  Doc ID   #[id number]  Job ID #[id number]    CC:

## 2018-07-02 RX ORDER — CYCLOBENZAPRINE HCL 10 MG
10 TABLET ORAL 3 TIMES DAILY PRN
Qty: 90 TABLET | Refills: 0 | Status: SHIPPED | OUTPATIENT
Start: 2018-07-02 | End: 2018-08-01

## 2018-07-02 NOTE — TELEPHONE ENCOUNTER
----- Message from Phuc Bray sent at 7/2/2018 12:58 PM CDT -----  Contact: Sarahy from [facility]  Can the clinic reply in [facility]: No      Please refill the medication(s) listed below. The patient can be reached at this phone number [phone] once it is called into the pharmacy.      Medication #1    cyclobenzaprine (FLEXERIL) 10 MG tablet 90 tablet 0 5/17/2018 6/16/2018 No  Sig - Route: Take 1 tablet (10 mg total) by mouth 3 (three) times daily as needed for Muscle spasms. - Oral  Class: Normal  Order: [id number]  Date/Time Signed: 5/17/2018 14:52      E-Prescribing Status: Receipt confirmed by pharmacy (5/17/2018  2:52 PM CDT)          Medication #2      Preferred Pharmacy:    [facility] #[id number] - [place] - [address] [place] AT [facility], [place] [phone] (Phone)  [phone] (Fax)

## 2018-09-05 ENCOUNTER — TELEPHONE (OUTPATIENT)
Dept: SPINE | Facility: CLINIC | Age: 68
End: 2018-09-05

## 2018-09-06 ENCOUNTER — OFFICE VISIT (OUTPATIENT)
Dept: NEUROSURGERY | Facility: CLINIC | Age: 68
End: 2018-09-06
Payer: COMMERCIAL

## 2018-09-06 VITALS
TEMPERATURE: 98 F | BODY MASS INDEX: 23.27 KG/M2 | DIASTOLIC BLOOD PRESSURE: 72 MMHG | WEIGHT: 157.63 LBS | HEART RATE: 70 BPM | SYSTOLIC BLOOD PRESSURE: 125 MMHG

## 2018-09-06 DIAGNOSIS — M41.50 DEGENERATIVE SCOLIOSIS IN ADULT PATIENT: Primary | ICD-10-CM

## 2018-09-06 PROCEDURE — 3078F DIAST BP <80 MM HG: CPT | Mod: CPTII,S$GLB,, | Performed by: NEUROLOGICAL SURGERY

## 2018-09-06 PROCEDURE — 1101F PT FALLS ASSESS-DOCD LE1/YR: CPT | Mod: CPTII,S$GLB,, | Performed by: NEUROLOGICAL SURGERY

## 2018-09-06 PROCEDURE — 3074F SYST BP LT 130 MM HG: CPT | Mod: CPTII,S$GLB,, | Performed by: NEUROLOGICAL SURGERY

## 2018-09-06 PROCEDURE — 99999 PR PBB SHADOW E&M-EST. PATIENT-LVL III: CPT | Mod: PBBFAC,,, | Performed by: NEUROLOGICAL SURGERY

## 2018-09-06 PROCEDURE — 99204 OFFICE O/P NEW MOD 45 MIN: CPT | Mod: S$GLB,,, | Performed by: NEUROLOGICAL SURGERY

## 2018-09-06 PROCEDURE — 3008F BODY MASS INDEX DOCD: CPT | Mod: CPTII,S$GLB,, | Performed by: NEUROLOGICAL SURGERY

## 2018-09-06 NOTE — PROGRESS NOTES
Dear Dr. Diez,      Thank you for referring this patient to my clinic. The full details of my evaluation will also be forthcoming to you by letter.    CHIEF COMPLAINT:  Consult for second opinion    I, Michael Esquivel, attest that this documentation has been prepared under the direction and in the presence of Villa Peña MD.    HPI:  Irais Mcneil is a 67 y.o.  male with history of L4/5 fusion complicated by infection who is referred to me by Dr. Diez for a second opinion concerning his low back and BLE pain. Pt reports primarily low back pain and BLE pain, right worse than left.  After his previous lumbar fusion surgery his pain had improved but returned in the last 2 years. His BLE pain is focused in his anterior and lateral thighs, right worse than left.  He states that his pain is waxing and waning saying that he feels improved some days but other days he feels worse. Pt's wife feels that his posture is leaning to one side but he does not notice it. Pt has been participating in physical therapy with improvement in his strength and walking. He has received injections into the low back with some relief.         Review of patient's allergies indicates:  No Known Allergies    Past Medical History:   Diagnosis Date    Back pain     Elevated PSA     Headache(784.0)     Hyperlipidemia     Hypertension     Other and unspecified hyperlipidemia     Renal disease     Stage II     Past Surgical History:   Procedure Laterality Date    BACK SURGERY      CATARACT EXTRACTION W/  INTRAOCULAR LENS IMPLANT Left 10/06/2008    yag 7/10/13    CATARACT EXTRACTION W/  INTRAOCULAR LENS IMPLANT Right 08/18/2008    yag 7/10/13    EYE SURGERY      bilat cataract    SHOULDER ARTHROSCOPY  2/8/12    rt shoulder     Family History   Problem Relation Age of Onset    Stroke Sister     Diabetes Sister     Hypertension Sister     Stroke Brother     Hypertension Brother     Hypertension Mother      Social History      Tobacco Use    Smoking status: Never Smoker    Smokeless tobacco: Never Used   Substance Use Topics    Alcohol use: No    Drug use: No        Review of Systems   Constitutional: Negative.    HENT: Negative.    Eyes: Negative.    Respiratory: Negative.    Cardiovascular: Negative.    Gastrointestinal: Negative.    Endocrine: Negative.    Genitourinary: Negative.    Musculoskeletal: Positive for back pain (low back pain) and myalgias (BLE, R>L). Negative for gait problem and neck pain.   Skin: Negative.    Allergic/Immunologic: Negative.    Neurological: Negative for weakness, light-headedness, numbness and headaches.   Hematological: Negative.    Psychiatric/Behavioral: Negative.        OBJECTIVE:   Vital Signs:  Temp: 98.3 °F (36.8 °C) (09/06/18 1028)  Pulse: 70 (09/06/18 1028)  BP: 125/72 (09/06/18 1028)    Physical Exam:    Vital signs: All nursing notes and vital signs reviewed -- afebrile, vital signs stable.  Constitutional: Patient sitting comfortably in chair. Appears well developed and well nourished.  Skin: Exposed areas are intact without abnormal markings, rashes or other lesions. Well healed midline lumbar scar.   HEENT: Normocephalic. Normal conjunctivae.  Cardiovascular: Normal rate and regular rhythm.  Respiratory: Chest wall rises and falls symmetrically, without signs of respiratory distress.  Abdomen: Soft and non-tender.  Extremities: Warm and without edema. Calves supple, non-tender.  Psych/Behavior: Normal affect.    Neurological:    Mental status: Alert and oriented. Conversational and appropriate.       Cranial Nerves: Grossly intact.     Motor:    Upper:  Deltoids Triceps Biceps WE WF     R 5/5 5/5 5/5 5/5 5/5 5/5    L 5/5 5/5 5/5 5/5 5/5 5/5      Lower:  HF KE KF DF PF EHL    R 5/5 5/5 5/5 5/5 5/5 5/5    L 5/5 5/5 5/5 5/5 5/5 5/5     Sensory: Intact sensation to light touch in all extremities. Romberg negative.    Reflexes:          DTR: 2+ patellar bilaterally.     Stewart's:  Negative.     Babinski's: Negative.     Clonus: Negative.    Cerebellar: Finger-to-nose and rapid alternating movements normal. Gait stable, fluid.    Spine:    Posture: Mild forward stooping.   No focal or global spinal deformity visible on inspection. Shoulders and hips even. No obvious leg length discrepancy. No scapula winging.    Bending: Full ROM with forward, back and lateral bending. No rib prominence with forward bend.    Cervical:      ROM: Full with flexion, extension, lateral rotation and ear-to-shoulder bend.      Midline TTP: Negative.     Spurling's test: Negative.     Lhermitte's: Negative.    Thoracic:     Midline TTP: Negative.     Lumbar:     Midline TTP: Positive, mid lumbar.      Straight Leg Test: Negative     Crossed Straight Leg Test: Negative     Sciatic notch tenderness: Negative.     TTP lumbosacral.     Other:     SI joint TTP: Negative.     Greater trochanter TTP: Negative.     Tenderness with external/internal hip rotation: Negative.    Diagnostic Results:  All imaging was independently reviewed by me.    MRI L-spine, dated 3/27/2018:  1. Multilevel spondylosis and stenosis worst at L3/4 where he has moderate to severe central stenosis and facet arthropathy  2. Fusion at L4/5     MRI Prostate, dated 4/24/2018:  1. PIRADS 5  2. Significant cancer is likely to be present    Flex/Ex X-ray L-spine, dated 4/10/2018:  1. No gross instability  2. Appears to have a solid fusion mass form L4 to S1    Scoliosis standing AP and Lateral X-ray, dated 4/10/2018:  1. Diffuse right coronal curve of thoracic and lumbar spine measuring 25 degrees  2. Global coronal alignment WNL  3. Global sagittal alignment WNL  4. 40 degrees of spinopelvic mismatch  5. Hypokyphosis thoracic spine  6. Hypolordosos lumbar spine  7. Pelvic Tilt: 24 degrees    ASSESSMENT/PLAN:     Irais Mcneil has adjacent segment disc disease at L3-4 causing axial back pain and right L3 radiculopathy, and he has iatrogenic  scoliosis and flat back syndrome. Because he is improving with PT and his curve is moderate and without global imbalance I recommend continued conservative management. I will order an L spine Ct to evaluate the bony anatomy better and he will follow up with me in 3 months. Should he need surgery in the future, he may be a good candidate for multi-level XLIFs (will ALL release) and perc screws.    The patient understands and agrees with the plan of care. All questions were answered.     1. CT L-spine  2. RTC 3 months      I, Dr. Villa Peña personally performed the services described in this documentation. All medical record entries made by the scribe, Michael Esquivel, were at my direction and in my presence.  I have reviewed the chart and agree that the record reflects my personal performance and is accurate and complete.      Villa Peña M.D.  Department of Neurosurgery  [facility]      .

## 2018-09-06 NOTE — LETTER
September 6, 2018      Sherwin Richardson DO  [address] [place] [address]   [address]  [place] [address]           [facility]surgery 7th Fl  [address]  [facility] [address]  Phone: [phone]          Patient: Irais Mcneil   MR Number: [id number]   YOB: 1950   Date of Visit: 9/6/2018       Dear Dr. Sherwin Richardson:    Thank you for referring Irais Mcneil to me for evaluation. Attached you will find relevant portions of my assessment and plan of care.    If you have questions, please do not hesitate to call me. I look forward to following Irais Mcneil along with you.    Sincerely,    Villa Peña MD    Enclosure  CC:  No Recipients    If you would like to receive this communication electronically, please contact [email or web address] or [phone] to request more information on [employer] access.    For providers and/or their staff who would like to refer a patient to [facility], please contact us through our one-stop-shop provider referral line, [facility] , at [phone].    If you feel you have received this communication in error or would no longer like to receive these types of communications, please e-mail [email or web address]

## 2018-09-18 ENCOUNTER — OFFICE VISIT (OUTPATIENT)
Dept: SPINE | Facility: CLINIC | Age: 68
End: 2018-09-18
Payer: COMMERCIAL

## 2018-09-18 VITALS
HEART RATE: 70 BPM | HEIGHT: 69 IN | WEIGHT: 157 LBS | DIASTOLIC BLOOD PRESSURE: 85 MMHG | BODY MASS INDEX: 23.25 KG/M2 | SYSTOLIC BLOOD PRESSURE: 142 MMHG

## 2018-09-18 DIAGNOSIS — M54.41 BILATERAL LOW BACK PAIN WITH BILATERAL SCIATICA, UNSPECIFIED CHRONICITY: ICD-10-CM

## 2018-09-18 DIAGNOSIS — M43.10 ACQUIRED SPONDYLOLISTHESIS: ICD-10-CM

## 2018-09-18 DIAGNOSIS — M54.14 THORACIC AND LUMBOSACRAL NEURITIS: ICD-10-CM

## 2018-09-18 DIAGNOSIS — M48.061 SPINAL STENOSIS OF LUMBAR REGION WITHOUT NEUROGENIC CLAUDICATION: ICD-10-CM

## 2018-09-18 DIAGNOSIS — Z98.1 HISTORY OF LUMBAR FUSION: ICD-10-CM

## 2018-09-18 DIAGNOSIS — M41.9 SCOLIOSIS, UNSPECIFIED SCOLIOSIS TYPE, UNSPECIFIED SPINAL REGION: Primary | ICD-10-CM

## 2018-09-18 DIAGNOSIS — M54.17 THORACIC AND LUMBOSACRAL NEURITIS: ICD-10-CM

## 2018-09-18 DIAGNOSIS — M47.819 SPONDYLOSIS WITHOUT MYELOPATHY: ICD-10-CM

## 2018-09-18 DIAGNOSIS — M54.42 BILATERAL LOW BACK PAIN WITH BILATERAL SCIATICA, UNSPECIFIED CHRONICITY: ICD-10-CM

## 2018-09-18 DIAGNOSIS — M51.37 DDD (DEGENERATIVE DISC DISEASE), LUMBOSACRAL: ICD-10-CM

## 2018-09-18 PROCEDURE — 99214 OFFICE O/P EST MOD 30 MIN: CPT | Mod: S$GLB,,, | Performed by: PHYSICIAN ASSISTANT

## 2018-09-18 PROCEDURE — 99999 PR PBB SHADOW E&M-EST. PATIENT-LVL III: CPT | Mod: PBBFAC,,, | Performed by: PHYSICIAN ASSISTANT

## 2018-09-18 PROCEDURE — 3079F DIAST BP 80-89 MM HG: CPT | Mod: CPTII,S$GLB,, | Performed by: PHYSICIAN ASSISTANT

## 2018-09-18 PROCEDURE — 1101F PT FALLS ASSESS-DOCD LE1/YR: CPT | Mod: CPTII,S$GLB,, | Performed by: PHYSICIAN ASSISTANT

## 2018-09-18 PROCEDURE — 3008F BODY MASS INDEX DOCD: CPT | Mod: CPTII,S$GLB,, | Performed by: PHYSICIAN ASSISTANT

## 2018-09-18 PROCEDURE — 3077F SYST BP >= 140 MM HG: CPT | Mod: CPTII,S$GLB,, | Performed by: PHYSICIAN ASSISTANT

## 2018-09-18 NOTE — PROGRESS NOTES
Subjective:      Patient ID: Irais Mcneil is a 67 y.o. male.    Chief Complaint: Follow-up      HPI  ([facility])    History of migraines, CKD stage 2, hyperlipidemia, HTN.     History of spinal surgery in 2000- had fusion at L4-L5 (José Miguel). He had postop infection, he had a washout done and he was on IV antibiotics. Pain improved until last year. Fusion looks good at L4-L5, he has adjacent segment disease at L3-L4 with moderate stenosis and mild/left moderate right foraminal stenosis. Also with mild central stenosis and varying foraminal stenosis L1-L2, L2-L3, and L5-S1. Pain generator is likely L3-L4.    He also had bilateral L3-L4 TF LAKEISHA with Dr. Person on 4/25/18 with at least 50% initial improvement with injection for a few weeks. Repeat injection ordered at last visit but not done. He continues with PT. He saw Dr. Peña for second opinion- he recommended continued conservative treatment as long as he was improving. He ordered CT scan of thoracic/lumbar spine as well.     He is here for follow up.     He thinks PT is helping. He is walking better and has less stiffness in the morning when he first wakes up. He has improvement with doing his morning stretches. He continues with intermittent LBP with bilateral anterior thigh pain and posterior pain behind the knees. LBP > leg pain. Right leg pain > left leg pain. Pain is worse with sitting and driving. He rates his pain as a 4 on a scale of 1-10. Pain is aching and sharp. No numbness, tingling, or weakness in his legs. He takes neurontin at night only due to side effects. He takes prn flexeril.       Review of Systems   Constitution: Negative for chills, fever, night sweats and weight gain.   Gastrointestinal: Negative for bowel incontinence, nausea and vomiting.   Genitourinary: Negative for bladder incontinence.   Neurological: Negative for disturbances in coordination and loss of balance.           Objective:        General: Irais is well-developed,  well-nourished, appears stated age, in no acute distress, alert and oriented to time, place and person.     Ortho/SPM Exam     Patient sits comfortably in the exam room and answers questions appropriately. Grossly patient is able to move bilateral LEs without difficulty. Ambulates normally.         Assessment:       1. Scoliosis, unspecified scoliosis type, unspecified spinal region    2. History of lumbar fusion    3. Spondylosis without myelopathy    4. DDD (degenerative disc disease), lumbosacral    5. Spinal stenosis of lumbar region without neurogenic claudication    6. Thoracic and lumbosacral neuritis    7. Acquired spondylolisthesis    8. Bilateral low back pain with bilateral sciatica, unspecified chronicity           Plan:            History of spinal surgery in 2000- had fusion at L4-L5 (Harrison). He had postop infection, he had a washout done and he was on IV antibiotics. Pain improved until last year. Some improvement with recent lumbar LAKEISHA and even more improvement with PT. He continues with intermittent LBP with bilateral anterior thigh pain and posterior pain behind the knees. LBP > leg pain. Right leg pain > left leg pain.     Fusion looks good at L4-L5, he has adjacent segment disease at L3-L4 with moderate stenosis and mild/left moderate right foraminal stenosis. Also with mild central stenosis and varying foraminal stenosis L1-L2, L2-L3, and L5-S1. Pain generator is likely L3-L4. He has mild scoliosis and flat back deformity as well. No sagittal imbalance.     He is doing better than he has been at his previous visits.     Treatment options reviewed with patient and following plan made:     - Continue with PT.   - Continue with neurontin at night and prn flexeril. No refills needed.   - Consider repeat bilateral L3-L4 TF LAKEISHA with pain management if pain gets worse. May try facet injections as well.   - He will f/u with Dr. Peña as scheduled.   - He will f/u with me prn. Will message me with any  concerns.     Follow-up: Follow-up if symptoms worsen or fail to improve. If there are any questions prior to this, the patient was instructed to contact the office.

## 2018-10-02 DIAGNOSIS — M54.17 THORACIC AND LUMBOSACRAL NEURITIS: ICD-10-CM

## 2018-10-02 DIAGNOSIS — M43.10 ACQUIRED SPONDYLOLISTHESIS: ICD-10-CM

## 2018-10-02 DIAGNOSIS — M47.819 SPONDYLOSIS WITHOUT MYELOPATHY: ICD-10-CM

## 2018-10-02 DIAGNOSIS — M54.14 THORACIC AND LUMBOSACRAL NEURITIS: ICD-10-CM

## 2018-10-02 DIAGNOSIS — M54.41 BILATERAL LOW BACK PAIN WITH BILATERAL SCIATICA, UNSPECIFIED CHRONICITY: ICD-10-CM

## 2018-10-02 DIAGNOSIS — M51.37 DDD (DEGENERATIVE DISC DISEASE), LUMBOSACRAL: ICD-10-CM

## 2018-10-02 DIAGNOSIS — M54.42 BILATERAL LOW BACK PAIN WITH BILATERAL SCIATICA, UNSPECIFIED CHRONICITY: ICD-10-CM

## 2018-10-02 DIAGNOSIS — M48.061 SPINAL STENOSIS OF LUMBAR REGION WITHOUT NEUROGENIC CLAUDICATION: ICD-10-CM

## 2018-10-02 DIAGNOSIS — Z98.1 HISTORY OF LUMBAR FUSION: ICD-10-CM

## 2018-10-02 RX ORDER — GABAPENTIN 300 MG/1
300 CAPSULE ORAL 3 TIMES DAILY
Qty: 90 CAPSULE | Refills: 2 | Status: SHIPPED | OUTPATIENT
Start: 2018-10-02 | End: 2019-01-15 | Stop reason: SDUPTHER

## 2018-10-02 RX ORDER — CYCLOBENZAPRINE HCL 10 MG
10 TABLET ORAL 3 TIMES DAILY PRN
COMMUNITY
End: 2018-10-02 | Stop reason: SDUPTHER

## 2018-10-02 RX ORDER — CYCLOBENZAPRINE HCL 10 MG
10 TABLET ORAL 3 TIMES DAILY PRN
Qty: 90 TABLET | Refills: 0 | Status: SHIPPED | OUTPATIENT
Start: 2018-10-02 | End: 2018-11-01

## 2018-10-22 ENCOUNTER — TELEPHONE (OUTPATIENT)
Dept: NEUROSURGERY | Facility: CLINIC | Age: 68
End: 2018-10-22

## 2018-10-22 NOTE — TELEPHONE ENCOUNTER
Called pt to notify him that CT and appt with Julioyvas have been moved from 12/13 to 12/6. No answer, LVM with new appt information, appt reminder in the mail.

## 2018-11-26 DIAGNOSIS — M62.838 MUSCLE SPASM: Primary | ICD-10-CM

## 2018-11-26 RX ORDER — CYCLOBENZAPRINE HCL 10 MG
10 TABLET ORAL 3 TIMES DAILY PRN
Qty: 90 TABLET | Refills: 0 | Status: SHIPPED | OUTPATIENT
Start: 2018-11-26 | End: 2018-12-26 | Stop reason: SDUPTHER

## 2018-12-26 DIAGNOSIS — M62.838 MUSCLE SPASM: ICD-10-CM

## 2018-12-26 RX ORDER — CYCLOBENZAPRINE HCL 10 MG
10 TABLET ORAL 3 TIMES DAILY PRN
Qty: 90 TABLET | Refills: 0 | Status: SHIPPED | OUTPATIENT
Start: 2018-12-26 | End: 2019-01-28 | Stop reason: SDUPTHER

## 2019-01-15 DIAGNOSIS — M47.819 SPONDYLOSIS WITHOUT MYELOPATHY: ICD-10-CM

## 2019-01-15 DIAGNOSIS — M43.10 ACQUIRED SPONDYLOLISTHESIS: ICD-10-CM

## 2019-01-15 DIAGNOSIS — M48.061 SPINAL STENOSIS OF LUMBAR REGION WITHOUT NEUROGENIC CLAUDICATION: ICD-10-CM

## 2019-01-15 DIAGNOSIS — M54.14 THORACIC AND LUMBOSACRAL NEURITIS: ICD-10-CM

## 2019-01-15 DIAGNOSIS — M54.42 BILATERAL LOW BACK PAIN WITH BILATERAL SCIATICA, UNSPECIFIED CHRONICITY: ICD-10-CM

## 2019-01-15 DIAGNOSIS — Z98.1 HISTORY OF LUMBAR FUSION: ICD-10-CM

## 2019-01-15 DIAGNOSIS — M54.17 THORACIC AND LUMBOSACRAL NEURITIS: ICD-10-CM

## 2019-01-15 DIAGNOSIS — M51.37 DDD (DEGENERATIVE DISC DISEASE), LUMBOSACRAL: ICD-10-CM

## 2019-01-15 DIAGNOSIS — M54.41 BILATERAL LOW BACK PAIN WITH BILATERAL SCIATICA, UNSPECIFIED CHRONICITY: ICD-10-CM

## 2019-01-15 RX ORDER — GABAPENTIN 300 MG/1
300 CAPSULE ORAL 3 TIMES DAILY
Qty: 90 CAPSULE | Refills: 2 | Status: SHIPPED | OUTPATIENT
Start: 2019-01-15 | End: 2020-11-20

## 2019-01-28 DIAGNOSIS — M62.838 MUSCLE SPASM: ICD-10-CM

## 2019-01-30 RX ORDER — CYCLOBENZAPRINE HCL 10 MG
TABLET ORAL
Qty: 90 TABLET | Refills: 0 | Status: SHIPPED | OUTPATIENT
Start: 2019-01-30 | End: 2019-03-01 | Stop reason: SDUPTHER

## 2019-01-31 ENCOUNTER — OFFICE VISIT (OUTPATIENT)
Dept: NEUROSURGERY | Facility: CLINIC | Age: 69
End: 2019-01-31
Attending: NEUROLOGICAL SURGERY
Payer: COMMERCIAL

## 2019-01-31 ENCOUNTER — HOSPITAL ENCOUNTER (OUTPATIENT)
Dept: RADIOLOGY | Facility: HOSPITAL | Age: 69
Discharge: HOME | End: 2019-01-31
Attending: NEUROLOGICAL SURGERY
Payer: COMMERCIAL

## 2019-01-31 VITALS — DIASTOLIC BLOOD PRESSURE: 72 MMHG | HEART RATE: 73 BPM | TEMPERATURE: 98 F | SYSTOLIC BLOOD PRESSURE: 117 MMHG

## 2019-01-31 DIAGNOSIS — M41.50 DEGENERATIVE SCOLIOSIS IN ADULT PATIENT: ICD-10-CM

## 2019-01-31 DIAGNOSIS — M41.50 DEGENERATIVE SCOLIOSIS IN ADULT PATIENT: Primary | ICD-10-CM

## 2019-01-31 DIAGNOSIS — M54.15 RADICULOPATHY OF THORACOLUMBAR REGION: ICD-10-CM

## 2019-01-31 PROCEDURE — 1101F PT FALLS ASSESS-DOCD LE1/YR: CPT | Mod: CPTII,S$GLB,, | Performed by: NEUROLOGICAL SURGERY

## 2019-01-31 PROCEDURE — 99999 PR PBB SHADOW E&M-EST. PATIENT-LVL III: ICD-10-PCS | Mod: PBBFAC,,, | Performed by: NEUROLOGICAL SURGERY

## 2019-01-31 PROCEDURE — 99214 PR OFFICE/OUTPT VISIT, EST, LEVL IV, 30-39 MIN: ICD-10-PCS | Mod: S$GLB,,, | Performed by: NEUROLOGICAL SURGERY

## 2019-01-31 PROCEDURE — 72131 CT LUMBAR SPINE W/O DYE: CPT | Mod: TC

## 2019-01-31 PROCEDURE — 3078F PR MOST RECENT DIASTOLIC BLOOD PRESSURE < 80 MM HG: ICD-10-PCS | Mod: CPTII,S$GLB,, | Performed by: NEUROLOGICAL SURGERY

## 2019-01-31 PROCEDURE — 72131 CT LUMBAR SPINE WITHOUT CONTRAST: ICD-10-PCS | Mod: 26,,, | Performed by: RADIOLOGY

## 2019-01-31 PROCEDURE — 3074F PR MOST RECENT SYSTOLIC BLOOD PRESSURE < 130 MM HG: ICD-10-PCS | Mod: CPTII,S$GLB,, | Performed by: NEUROLOGICAL SURGERY

## 2019-01-31 PROCEDURE — 72131 CT LUMBAR SPINE W/O DYE: CPT | Mod: 26,,, | Performed by: RADIOLOGY

## 2019-01-31 PROCEDURE — 99999 PR PBB SHADOW E&M-EST. PATIENT-LVL III: CPT | Mod: PBBFAC,,, | Performed by: NEUROLOGICAL SURGERY

## 2019-01-31 PROCEDURE — 1101F PR PT FALLS ASSESS DOC 0-1 FALLS W/OUT INJ PAST YR: ICD-10-PCS | Mod: CPTII,S$GLB,, | Performed by: NEUROLOGICAL SURGERY

## 2019-01-31 PROCEDURE — 3078F DIAST BP <80 MM HG: CPT | Mod: CPTII,S$GLB,, | Performed by: NEUROLOGICAL SURGERY

## 2019-01-31 PROCEDURE — 99214 OFFICE O/P EST MOD 30 MIN: CPT | Mod: S$GLB,,, | Performed by: NEUROLOGICAL SURGERY

## 2019-01-31 PROCEDURE — 3074F SYST BP LT 130 MM HG: CPT | Mod: CPTII,S$GLB,, | Performed by: NEUROLOGICAL SURGERY

## 2019-01-31 RX ORDER — HYDROCHLOROTHIAZIDE 25 MG/1
25 TABLET ORAL DAILY
Refills: 11 | COMMUNITY
Start: 2018-12-22 | End: 2019-05-22 | Stop reason: ALTCHOICE

## 2019-01-31 NOTE — PROGRESS NOTES
CHIEF COMPLAINT:  Follow up with new imaging    I, Michael Esquivel, attest that this documentation has been prepared under the direction and in the presence of Villa Peña MD.    HPI:  Irais Mcneil is a 68 y.o.  male with history of L4/5 fusion complicated by infection who presents today for follow up evaluation with new imaging. Pt reports he is doing well. His pain is at a 3-4/10 in severity. He notes continued posterior BLE pain worse in the calves, right worse than left. He says that he must constantly change positions to remain comfortable. Physical therapy has improved his pain. His low back pain is worse than his BLE pain. Pt states that his legs feel tight but after stretching his pain improves. This relief is only temporary lasting for a a short period. Pt was walking but it exacerbates his right calf pain. Pt received an LAKEISHA in April 2018 with no significant lasting relief. Pt would not like to pursue surgery just yet.       Review of patient's allergies indicates:  No Known Allergies    Past Medical History:   Diagnosis Date    Back pain     Elevated PSA     Headache(784.0)     Hyperlipidemia     Hypertension     Other and unspecified hyperlipidemia     Renal disease     Stage II     Past Surgical History:   Procedure Laterality Date    BACK SURGERY      CATARACT EXTRACTION W/  INTRAOCULAR LENS IMPLANT Left 10/06/2008    yag 7/10/13    CATARACT EXTRACTION W/  INTRAOCULAR LENS IMPLANT Right 08/18/2008    yag 7/10/13    CYSTOSCOPY N/A 12/1/2015    Performed by [facility] Milton [facility] at [facility] OR    EYE SURGERY      bilat cataract    INJECTION-STEROID-EPIDURAL-TRANSFORAMINAL Bilateral 4/25/2018    [facility]    SHOULDER ARTHROSCOPY  2/8/12    rt shoulder    TRANSRECTAL ULTRASOUND GUIDED PROSTATE BIOPSY N/A 5/28/2018    Performed by Jeramie Hill MD at [facility] OR    TRANSRECTAL ULTRASOUND GUIDED PROSTATE BIOPSY Bilateral 12/1/2015    Performed by  [facility] at [facility] OR     Family History   Problem Relation Age of Onset    Stroke Sister     Diabetes Sister     Hypertension Sister     Stroke Brother     Hypertension Brother     Hypertension Mother      Social History     Tobacco Use    Smoking status: Never Smoker    Smokeless tobacco: Never Used   Substance Use Topics    Alcohol use: No    Drug use: No        Review of Systems   Constitutional: Negative.    HENT: Negative.    Eyes: Negative.    Respiratory: Negative.    Cardiovascular: Negative.    Gastrointestinal: Negative.    Endocrine: Negative.    Genitourinary: Negative.    Musculoskeletal: Positive for back pain (low back) and myalgias (posterior BLE, R>L). Negative for gait problem and neck pain.   Skin: Negative.    Allergic/Immunologic: Negative.    Neurological: Negative for weakness, light-headedness, numbness and headaches.   Hematological: Negative.    Psychiatric/Behavioral: Negative.        OBJECTIVE:   Vital Signs:  Temp: 98.1 °F (36.7 °C) (01/31/19 1111)  Pulse: 73 (01/31/19 1111)  BP: 117/72 (01/31/19 1111)    Physical Exam:    Vital signs: All nursing notes and vital signs reviewed -- afebrile, vital signs stable.  Constitutional: Patient sitting comfortably in chair. Appears well developed and well nourished.  Skin: Exposed areas are intact without abnormal markings, rashes or other lesions. Well healed midline lumbar scar.   HEENT: Normocephalic. Normal conjunctivae.  Cardiovascular: Normal rate and regular rhythm.  Respiratory: Chest wall rises and falls symmetrically, without signs of respiratory distress.  Abdomen: Soft and non-tender.  Extremities: Warm and without edema. Calves supple, non-tender.  Psych/Behavior: Normal affect.    Neurological:    Mental status: Alert and oriented. Conversational and appropriate.       Cranial Nerves: Grossly intact.     Motor:    Upper:  Deltoids Triceps Biceps WE WF     R 5/5 5/5 5/5 5/5 5/5 5/5    L 5/5 5/5 5/5 5/5 5/5  5/5      Lower:  HF KE KF DF PF EHL    R 5/5 5/5 5/5 5/5 5/5 5/5    L 5/5 5/5 5/5 5/5 5/5 5/5     Sensory: Intact sensation to light touch in all extremities. Romberg negative.    Reflexes:          DTR: 2+ patellar bilaterally.     Stewart's: Negative.     Babinski's: Negative.     Clonus: Negative.    Cerebellar: Finger-to-nose and rapid alternating movements normal. Gait stable, fluid.    Spine:    Posture: Mild forward stooping.  No focal or global spinal deformity visible on inspection. Shoulders and hips even. No obvious leg length discrepancy. No scapula winging.    Bending: Full ROM with forward, back and lateral bending. No rib prominence with forward bend.    Cervical:      ROM: Full with flexion, extension, lateral rotation and ear-to-shoulder bend.      Midline TTP: Negative.     Spurling's test: Negative.     Lhermitte's: Negative.    Thoracic:     Midline TTP: Negative    Lumbar:     Midline TTP: Positive, mid lumbar.      Straight Leg Test: Negative     Crossed Straight Leg Test: Negative     Sciatic notch tenderness: Negative.     TTP lumbosacral.     Other:     SI joint TTP: Negative.     Greater trochanter TTP: Negative.     Tenderness with external/internal hip rotation: Negative.    Diagnostic Results:  All imaging was independently reviewed by me.    MRI L-spine, dated 3/27/2018:   1. Severe right L5/S1 neuroforaminal stenosis     CT L-spine, dated 01/31/2019:  1.  film shows a right coronal lumbar curve apex at L1/2 measuring 13 degrees  2. Focal kyphosis apex at L2/3 measuring 24 degrees  3. Vacuum discs at L1/2, L2/3, and L3/4   4. Solid bony fusion at L4/5   5. No instrumentation    ASSESSMENT/PLAN:     Irais Mcneil has degenerative scoliosis and flat back syndrome causing tolerable back pain. His back pain has responded well to PT. His primary concern at the moment is right lumbosacral radicular pain and symptoms of neurogenic claudication. We discussed surgical and non-surgical  options. Unfortunately, given his curve a small decompression surgery is not a good idea and surgery would involve a larger deformity reduction. Since he is generally doing well, I recommend conservative management only, which will include a right selective nerve block at L3-4 and L5-S1. He will follow up with me in 1 year with a repeat scoliosis xray to assess curve progression.    The patient understands and agrees with the plan of care. All questions were answered.     1. Referral to Dr. Person for ESIs  2. RTC 1 year with Scoliosis X-ray       I, Dr. Villa Peña personally performed the services described in this documentation. All medical record entries made by the scribe, Michael Esquivel, were at my direction and in my presence.  I have reviewed the chart and agree that the record reflects my personal performance and is accurate and complete.      Villa Peña M.D.  Department of Neurosurgery  [facility]      .

## 2019-01-31 NOTE — H&P (VIEW-ONLY)
CHIEF COMPLAINT:  Follow up with new imaging    I, Michael Esquivel, attest that this documentation has been prepared under the direction and in the presence of Villa Peña MD.    HPI:  Irais Mcneil is a 68 y.o.  male with history of L4/5 fusion complicated by infection who presents today for follow up evaluation with new imaging. Pt reports he is doing well. His pain is at a 3-4/10 in severity. He notes continued posterior BLE pain worse in the calves, right worse than left. He says that he must constantly change positions to remain comfortable. Physical therapy has improved his pain. His low back pain is worse than his BLE pain. Pt states that his legs feel tight but after stretching his pain improves. This relief is only temporary lasting for a a short period. Pt was walking but it exacerbates his right calf pain. Pt received an LAKEISHA in April 2018 with no significant lasting relief. Pt would not like to pursue surgery just yet.       Review of patient's allergies indicates:  No Known Allergies    Past Medical History:   Diagnosis Date    Back pain     Elevated PSA     Headache(784.0)     Hyperlipidemia     Hypertension     Other and unspecified hyperlipidemia     Renal disease     Stage II     Past Surgical History:   Procedure Laterality Date    BACK SURGERY      CATARACT EXTRACTION W/  INTRAOCULAR LENS IMPLANT Left 10/06/2008    yag 7/10/13    CATARACT EXTRACTION W/  INTRAOCULAR LENS IMPLANT Right 08/18/2008    yag 7/10/13    CYSTOSCOPY N/A 12/1/2015    [facility]    EYE SURGERY      bilat cataract    INJECTION-STEROID-EPIDURAL-TRANSFORAMINAL Bilateral 4/25/2018    [facility]    SHOULDER ARTHROSCOPY  2/8/12    rt shoulder    TRANSRECTAL ULTRASOUND GUIDED PROSTATE BIOPSY N/A 5/28/2018    [facility] Hill [facility] at [facility]    TRANSRECTAL ULTRASOUND GUIDED PROSTATE BIOPSY Bilateral 12/1/2015    Performed by  [facility] at [facility] OR     Family History   Problem Relation Age of Onset    Stroke Sister     Diabetes Sister     Hypertension Sister     Stroke Brother     Hypertension Brother     Hypertension Mother      Social History     Tobacco Use    Smoking status: Never Smoker    Smokeless tobacco: Never Used   Substance Use Topics    Alcohol use: No    Drug use: No        Review of Systems   Constitutional: Negative.    HENT: Negative.    Eyes: Negative.    Respiratory: Negative.    Cardiovascular: Negative.    Gastrointestinal: Negative.    Endocrine: Negative.    Genitourinary: Negative.    Musculoskeletal: Positive for back pain (low back) and myalgias (posterior BLE, R>L). Negative for gait problem and neck pain.   Skin: Negative.    Allergic/Immunologic: Negative.    Neurological: Negative for weakness, light-headedness, numbness and headaches.   Hematological: Negative.    Psychiatric/Behavioral: Negative.        OBJECTIVE:   Vital Signs:  Temp: 98.1 °F (36.7 °C) (01/31/19 1111)  Pulse: 73 (01/31/19 1111)  BP: 117/72 (01/31/19 1111)    Physical Exam:    Vital signs: All nursing notes and vital signs reviewed -- afebrile, vital signs stable.  Constitutional: Patient sitting comfortably in chair. Appears well developed and well nourished.  Skin: Exposed areas are intact without abnormal markings, rashes or other lesions. Well healed midline lumbar scar.   HEENT: Normocephalic. Normal conjunctivae.  Cardiovascular: Normal rate and regular rhythm.  Respiratory: Chest wall rises and falls symmetrically, without signs of respiratory distress.  Abdomen: Soft and non-tender.  Extremities: Warm and without edema. Calves supple, non-tender.  Psych/Behavior: Normal affect.    Neurological:    Mental status: Alert and oriented. Conversational and appropriate.       Cranial Nerves: Grossly intact.     Motor:    Upper:  Deltoids Triceps Biceps WE WF     R 5/5 5/5 5/5 5/5 5/5 5/5    L 5/5 5/5 5/5 5/5 5/5  5/5      Lower:  HF KE KF DF PF EHL    R 5/5 5/5 5/5 5/5 5/5 5/5    L 5/5 5/5 5/5 5/5 5/5 5/5     Sensory: Intact sensation to light touch in all extremities. Romberg negative.    Reflexes:          DTR: 2+ patellar bilaterally.     Stewart's: Negative.     Babinski's: Negative.     Clonus: Negative.    Cerebellar: Finger-to-nose and rapid alternating movements normal. Gait stable, fluid.    Spine:    Posture: Mild forward stooping.  No focal or global spinal deformity visible on inspection. Shoulders and hips even. No obvious leg length discrepancy. No scapula winging.    Bending: Full ROM with forward, back and lateral bending. No rib prominence with forward bend.    Cervical:      ROM: Full with flexion, extension, lateral rotation and ear-to-shoulder bend.      Midline TTP: Negative.     Spurling's test: Negative.     Lhermitte's: Negative.    Thoracic:     Midline TTP: Negative    Lumbar:     Midline TTP: Positive, mid lumbar.      Straight Leg Test: Negative     Crossed Straight Leg Test: Negative     Sciatic notch tenderness: Negative.     TTP lumbosacral.     Other:     SI joint TTP: Negative.     Greater trochanter TTP: Negative.     Tenderness with external/internal hip rotation: Negative.    Diagnostic Results:  All imaging was independently reviewed by me.    MRI L-spine, dated 3/27/2018:   1. Severe right L5/S1 neuroforaminal stenosis     CT L-spine, dated 01/31/2019:  1.  film shows a right coronal lumbar curve apex at L1/2 measuring 13 degrees  2. Focal kyphosis apex at L2/3 measuring 24 degrees  3. Vacuum discs at L1/2, L2/3, and L3/4   4. Solid bony fusion at L4/5   5. No instrumentation    ASSESSMENT/PLAN:     Irais Mcneil has degenerative scoliosis and flat back syndrome causing tolerable back pain. His back pain has responded well to PT. His primary concern at the moment is right lumbosacral radicular pain and symptoms of neurogenic claudication. We discussed surgical and non-surgical  options. Unfortunately, given his curve a small decompression surgery is not a good idea and surgery would involve a larger deformity reduction. Since he is generally doing well, I recommend conservative management only, which will include a right selective nerve block at L3-4 and L5-S1. He will follow up with me in 1 year with a repeat scoliosis xray to assess curve progression.    The patient understands and agrees with the plan of care. All questions were answered.     1. Referral to Dr. Person for ESIs  2. RTC 1 year with Scoliosis X-ray       I, Dr. Villa Peña personally performed the services described in this documentation. All medical record entries made by the scribe, Michael Esquivel, were at my direction and in my presence.  I have reviewed the chart and agree that the record reflects my personal performance and is accurate and complete.      Villa Peña M.D.  Department of Neurosurgery  [facility]      .

## 2019-02-20 ENCOUNTER — HOSPITAL ENCOUNTER (OUTPATIENT)
Facility: OTHER | Age: 69
Discharge: HOME | End: 2019-02-20
Attending: ANESTHESIOLOGY | Admitting: ANESTHESIOLOGY
Payer: COMMERCIAL

## 2019-02-20 VITALS
HEIGHT: 69 IN | HEART RATE: 61 BPM | OXYGEN SATURATION: 100 % | RESPIRATION RATE: 18 BRPM | TEMPERATURE: 98 F | BODY MASS INDEX: 22.81 KG/M2 | DIASTOLIC BLOOD PRESSURE: 56 MMHG | WEIGHT: 154 LBS | SYSTOLIC BLOOD PRESSURE: 113 MMHG

## 2019-02-20 DIAGNOSIS — M51.36 DDD (DEGENERATIVE DISC DISEASE), LUMBAR: ICD-10-CM

## 2019-02-20 DIAGNOSIS — M54.17 LUMBOSACRAL RADICULOPATHY: Primary | ICD-10-CM

## 2019-02-20 DIAGNOSIS — G89.29 CHRONIC PAIN: ICD-10-CM

## 2019-02-20 PROCEDURE — 25000003 PHARM REV CODE 250: Performed by: ANESTHESIOLOGY

## 2019-02-20 PROCEDURE — 99152 PR MOD CONSCIOUS SEDATION, SAME PHYS, 5+ YRS, FIRST 15 MIN: ICD-10-PCS | Mod: ,,, | Performed by: ANESTHESIOLOGY

## 2019-02-20 PROCEDURE — 64483 PR EPIDURAL INJ, ANES/STEROID, TRANSFORAMINAL, LUMB/SACR, SNGL LEVL: ICD-10-PCS | Mod: RT,,, | Performed by: ANESTHESIOLOGY

## 2019-02-20 PROCEDURE — 64484 NJX AA&/STRD TFRM EPI L/S EA: CPT | Mod: RT,,, | Performed by: ANESTHESIOLOGY

## 2019-02-20 PROCEDURE — 63600175 PHARM REV CODE 636 W HCPCS: Performed by: ANESTHESIOLOGY

## 2019-02-20 PROCEDURE — 64484 PRA INJECT ANES/STEROID FORAMEN LUMBAR/SACRAL W IMG GUIDE ,EA ADD LEVEL: ICD-10-PCS | Mod: RT,,, | Performed by: ANESTHESIOLOGY

## 2019-02-20 PROCEDURE — 64483 NJX AA&/STRD TFRM EPI L/S 1: CPT | Performed by: ANESTHESIOLOGY

## 2019-02-20 PROCEDURE — 25500020 PHARM REV CODE 255: Performed by: ANESTHESIOLOGY

## 2019-02-20 PROCEDURE — 99152 MOD SED SAME PHYS/QHP 5/>YRS: CPT | Mod: ,,, | Performed by: ANESTHESIOLOGY

## 2019-02-20 PROCEDURE — 64484 NJX AA&/STRD TFRM EPI L/S EA: CPT | Performed by: ANESTHESIOLOGY

## 2019-02-20 PROCEDURE — 64483 NJX AA&/STRD TFRM EPI L/S 1: CPT | Mod: RT,,, | Performed by: ANESTHESIOLOGY

## 2019-02-20 RX ORDER — LIDOCAINE HYDROCHLORIDE 5 MG/ML
INJECTION, SOLUTION INFILTRATION; INTRAVENOUS
Status: DISCONTINUED | OUTPATIENT
Start: 2019-02-20 | End: 2019-02-20 | Stop reason: HOSPADM

## 2019-02-20 RX ORDER — LIDOCAINE HYDROCHLORIDE 10 MG/ML
INJECTION INFILTRATION; PERINEURAL
Status: DISCONTINUED | OUTPATIENT
Start: 2019-02-20 | End: 2019-02-20 | Stop reason: HOSPADM

## 2019-02-20 RX ORDER — SODIUM CHLORIDE 9 MG/ML
500 INJECTION, SOLUTION INTRAVENOUS CONTINUOUS
Status: DISCONTINUED | OUTPATIENT
Start: 2019-02-20 | End: 2019-02-20 | Stop reason: HOSPADM

## 2019-02-20 RX ORDER — MIDAZOLAM HYDROCHLORIDE 1 MG/ML
INJECTION INTRAMUSCULAR; INTRAVENOUS
Status: DISCONTINUED | OUTPATIENT
Start: 2019-02-20 | End: 2019-02-20 | Stop reason: HOSPADM

## 2019-02-20 RX ORDER — DEXAMETHASONE SODIUM PHOSPHATE 10 MG/ML
INJECTION INTRAMUSCULAR; INTRAVENOUS
Status: DISCONTINUED | OUTPATIENT
Start: 2019-02-20 | End: 2019-02-20 | Stop reason: HOSPADM

## 2019-02-20 RX ORDER — FENTANYL CITRATE 50 UG/ML
INJECTION, SOLUTION INTRAMUSCULAR; INTRAVENOUS
Status: DISCONTINUED | OUTPATIENT
Start: 2019-02-20 | End: 2019-02-20 | Stop reason: HOSPADM

## 2019-02-20 NOTE — DISCHARGE INSTRUCTIONS
Adult Procedural Sedation Instructions    Recovery After Procedural Sedation (Adult)  You have been given medicine by vein to make you sleep during your surgery. This may have included both a pain medicine and sleeping medicine. Most of the effects have worn off. But you may still have some drowsiness for the next 6 to 8 hours.  Home care  Follow these guidelines when you get home:  · For the next 8 hours, you should be watched by a responsible adult. This person should make sure your condition is not getting worse.  · Don't drink any alcohol for the next 24 hours.  · Don't drive, operate dangerous machinery, or make important business or personal decisions during the next 24 hours.  Note: Your healthcare provider may tell you not to take any medicine by mouth for pain or sleep in the next 4 hours. These medicines may react with the medicines you were given in the hospital. This could cause a much stronger response than usual.  Follow-up care  Follow up with your healthcare provider if you are not alert and back to your usual level of activity within 12 hours.  When to seek medical advice  Call your healthcare provider right away if any of these occur:  · Drowsiness gets worse  · Weakness or dizziness gets worse  · Repeated vomiting  · You can't be awakened   Date Last Reviewed: 10/18/2016  © 4431-9639 [employer]. [address] [place] [address], [place] [address]. All rights reserved. This information is not intended as a substitute for professional medical care. Always follow your healthcare professional's instructions.       Thank you for allowing us to care for you today. You may receive a survey about the care we provided. Your feedback is valuable and helps us provide excellent care throughout the community.     Home Care Instructions for Pain Management:    1. DIET:   You may resume your normal diet today.   2. BATHING:   You may shower with luke warm water. No tub baths or anything that will soak  injection sites under water for the next 24 hours.  3. DRESSING:   You may remove your bandage today.   4. ACTIVITY LEVEL:   You may resume your normal activities 24 hrs after your procedure. Nothing strenuous today.  5. MEDICATIONS:   You may resume your normal medications today. To restart blood thinners, ask your doctor.  6. DRIVING    If you have received any sedatives by mouth today, you may not drive for 12 hours.    If you have received any sedation through your IV, you may not drive for 24 hrs.   7. SPECIAL INSTRUCTIONS:   No heat to the injection site for 24 hrs including, hot bath or shower, heating pad, moist heat, or hot tubs.    Use ice pack to injection site for any pain or discomfort.  Apply ice packs for 20 minute intervals as needed.    IF you have diabetes, be sure to monitor your blood sugar more closely. IF your injection contained steroids your blood sugar levels may become higher than normal.    If you are still having pain upon discharge:  Your pain may improve over the next 48 hours. The anesthetic (numbing medication) works immediately to 48 hours. IF your injection contained a steroid (anti-inflammatory medication), it takes approximately 3 days to start feeling relief and 7-10 days to see your greatest results from the medication. It is possible you may need subsequent injections. This would be discussed at your follow up appointment with pain management or your referring doctor.      PLEASE CALL YOUR DOCTOR IF:  1. Redness or swelling around the injection site.  2. Fever of 101 degrees or more  3. Drainage (pus) from the injection site.  4. For any continuous bleeding (some dried blood over the incision is normal.)    FOR EMERGENCIES:   If any unusual problems or difficulties occur during clinic hours, call [phone] or [phone].

## 2019-02-20 NOTE — OP NOTE
Patient Name: Irais Mcneil  MRN: [id number]    INFORMED CONSENT: The procedure, risks, benefits and options were discussed with patient. There are no contraindications to the procedure. The patient expressed understanding and agreed to proceed. The personnel performing the procedure was discussed. I verify that I personally obtained Irais's consent prior to the start of the procedure and the signed consent can be found on the patient's chart.    Procedure Date: 02/20/2019    Anesthesia: Topical    Pre Procedure diagnosis: Radiculopathy of thoracolumbar region [M54.15]  Degenerative scoliosis in adult patient [M41.50]  1. Lumbosacral radiculopathy    2. DDD (degenerative disc disease), lumbar    3. Chronic pain      Post-Procedure diagnosis: SAME      Sedation: Yes - Fentanyl 100 mcg and Midazolam 2 mg    PROCEDURE:Right L3-4 AND L5-S1   TRANSFORAMINAL EPIDURAL STEROID INJECTION        DESCRIPTION OF PROCEDURE: The patient was brought to the procedure room. After performing time out IV access was obtained prior to the procedure. The patient was positioned prone on the fluoroscopy table. Continuous hemodynamic monitoring was initiated including blood pressure, EKG, and pulse oximetry. . The skin was prepped with chlorhexidine three times and draped in a sterile fashion. Skin anesthesia was achieved using 3 mL of lidocaine 1% over the respective injection site.     An oblique fluoroscopic view was obtained, with the superior articular process of the inferior vertebral body aligned with the pedicle. The tip of a 22-gauge 3.5-inch Quincke-type spinal needle was advanced toward the 6 oclock position of the pedicle under intermittent fluoroscopic guidance. Confirmation of proper needle position was made with AP, oblique, and lateral fluoroscopic views. Negative aspiration for blood or CSF was confirmed. 2 mL of Omnipaque 300 was injected. Live fluoroscopic imaging revealed a clear outline of the spinal nerve with  proximal spread of agent through the neural foramen into the anterior epidural space. A total combination of 3 mL of Lidocaine 0.5% and 10 mg decadron was injected at each level. Contrast spread was noted from L2 to L5 level. There was no pain on injection. The needle was removed and bleeding was nil.  A sterile dressing was applied. Esbii was taken back to the recovery room for further observation.     Blood Loss: Nill  Specimen: None    Felix Person MD

## 2019-02-20 NOTE — DISCHARGE SUMMARY
Discharge Note  Short Stay      SUMMARY     Admit Date: 2/20/2019    Attending Physician: Felix Person      Discharge Physician: Felix Person      Discharge Date: 2/20/2019 9:02 AM    Procedure(s) (LRB):  INJECTION, STEROID, EPIDURAL, TRANSFORAMINAL APPROACH (Right)    Final Diagnosis: Radiculopathy of thoracolumbar region [M54.15]  Degenerative scoliosis in adult patient [M41.50]    Disposition: Home or self care    Patient Instructions:   Current Discharge Medication List      CONTINUE these medications which have NOT CHANGED    Details   acetaminophen (TYLENOL) 500 MG tablet Take 500 mg by mouth every 6 (six) hours as needed for Pain.      amLODIPine (NORVASC) 10 MG tablet TAKE 1 TABLET (10 MG TOTAL) BY MOUTH ONCE DAILY.  Qty: 30 tablet, Refills: 5    Associated Diagnoses: Essential hypertension      aspirin (ECOTRIN) 81 MG EC tablet Take by mouth.      !! atorvastatin (LIPITOR) 20 MG tablet TAKE 1 TABLET BY MOUTH EVERY DAY IN THE EVENING  Qty: 30 tablet, Refills: 0    Associated Diagnoses: Essential hypertension      !! atorvastatin (LIPITOR) 20 MG tablet TAKE 1 TABLET BY MOUTH EVERY DAY IN THE EVENING  Qty: 30 tablet, Refills: 1    Associated Diagnoses: Essential hypertension      cyclobenzaprine (FLEXERIL) 10 MG tablet TAKE 1 TABLET BY MOUTH THREE TIMES A DAY AS NEEDED FOR MUSCLE SPASMS  Qty: 90 tablet, Refills: 0    Associated Diagnoses: Muscle spasm      folic acid (FOLVITE) 1 MG tablet TAKE 1 TABLET BY MOUTH EVERY DAY  Qty: 90 tablet, Refills: 0    Associated Diagnoses: Peripheral vascular disease      gabapentin (NEURONTIN) 300 MG capsule Take 1 capsule (300 mg total) by mouth 3 (three) times daily.  Qty: 90 capsule, Refills: 2    Associated Diagnoses: History of lumbar fusion; Spondylosis without myelopathy; DDD (degenerative disc disease), lumbosacral; Spinal stenosis of lumbar region without neurogenic claudication; Thoracic and lumbosacral neuritis; Acquired spondylolisthesis; Bilateral low  back pain with bilateral sciatica, unspecified chronicity      hydroCHLOROthiazide (HYDRODIURIL) 25 MG tablet Take 25 mg by mouth once daily.  Refills: 11      topiramate (TOPAMAX) 25 MG tablet TAKE 1 TABLET BY MOUTH EVERY DAY  Qty: 90 tablet, Refills: 1    Associated Diagnoses: Atypical migraine      !! traMADol (ULTRAM) 50 mg tablet TAKE 1-2 TABLETS EVERY 6-8 HOURS AS NEEDED FOR PAIN  Qty: 30 tablet, Refills: 1    Comments: Not to exceed 4 additional fills before 08/08/2018.      !! traMADol (ULTRAM) 50 mg tablet TAKE 1-2 TABLETS BY MOUTH EVERY 6-8 HOURS AS NEEDED FOR PAIN  Qty: 30 tablet, Refills: 1    Comments: This request is for a new prescription for a controlled substance as required by Federal/State law.      valsartan (DIOVAN) 40 MG tablet TAKE 1 TABLET EVERY DAY  Qty: 90 tablet, Refills: 3       !! - Potential duplicate medications found. Please discuss with provider.              Discharge Diagnosis: Radiculopathy of thoracolumbar region [M54.15]  Degenerative scoliosis in adult patient [M41.50]  Condition on Discharge: Stable with no complications to procedure   Diet on Discharge: Same as before.  Activity: as per instruction sheet.  Discharge to: Home with a responsible adult.  Follow up: 2-4 weeks

## 2019-03-01 DIAGNOSIS — M62.838 MUSCLE SPASM: ICD-10-CM

## 2019-03-01 RX ORDER — CYCLOBENZAPRINE HCL 10 MG
TABLET ORAL
Qty: 90 TABLET | Refills: 0 | Status: SHIPPED | OUTPATIENT
Start: 2019-03-01 | End: 2019-04-02 | Stop reason: SDUPTHER

## 2019-04-02 DIAGNOSIS — M62.838 MUSCLE SPASM: ICD-10-CM

## 2019-04-02 RX ORDER — CYCLOBENZAPRINE HCL 10 MG
TABLET ORAL
Qty: 90 TABLET | Refills: 0 | Status: SHIPPED | OUTPATIENT
Start: 2019-04-02 | End: 2019-05-08 | Stop reason: SDUPTHER

## 2019-04-30 ENCOUNTER — LAB VISIT (OUTPATIENT)
Dept: LAB | Facility: HOSPITAL | Age: 69
End: 2019-04-30
Attending: PSYCHIATRY & NEUROLOGY
Payer: COMMERCIAL

## 2019-04-30 DIAGNOSIS — G43.009 MIGRAINE WITHOUT AURA AND WITHOUT STATUS MIGRAINOSUS, NOT INTRACTABLE: ICD-10-CM

## 2019-04-30 DIAGNOSIS — G43.809 LOWER HALF MIGRAINE: ICD-10-CM

## 2019-04-30 DIAGNOSIS — I73.9 PERIPHERAL VASCULAR DISEASE, UNSPECIFIED: ICD-10-CM

## 2019-04-30 LAB
25(OH)D3+25(OH)D2 SERPL-MCNC: 44 NG/ML (ref 30–96)
ALBUMIN SERPL BCP-MCNC: 3.9 G/DL (ref 3.5–5.2)
ALP SERPL-CCNC: 82 U/L (ref 55–135)
ALT SERPL W/O P-5'-P-CCNC: 25 U/L (ref 10–44)
ANION GAP SERPL CALC-SCNC: 6 MMOL/L (ref 8–16)
AST SERPL-CCNC: 32 U/L (ref 10–40)
BASOPHILS # BLD AUTO: 0.02 K/UL (ref 0–0.2)
BASOPHILS NFR BLD: 0.7 % (ref 0–1.9)
BILIRUB SERPL-MCNC: 0.5 MG/DL (ref 0.1–1)
BUN SERPL-MCNC: 18 MG/DL (ref 8–23)
CALCIUM SERPL-MCNC: 9.7 MG/DL (ref 8.7–10.5)
CHLORIDE SERPL-SCNC: 108 MMOL/L (ref 95–110)
CHOLEST SERPL-MCNC: 185 MG/DL (ref 120–199)
CHOLEST/HDLC SERPL: 2.2 {RATIO} (ref 2–5)
CO2 SERPL-SCNC: 28 MMOL/L (ref 23–29)
CREAT SERPL-MCNC: 1.7 MG/DL (ref 0.5–1.4)
DIFFERENTIAL METHOD: ABNORMAL
EOSINOPHIL # BLD AUTO: 0.2 K/UL (ref 0–0.5)
EOSINOPHIL NFR BLD: 7.4 % (ref 0–8)
ERYTHROCYTE [DISTWIDTH] IN BLOOD BY AUTOMATED COUNT: 13.6 % (ref 11.5–14.5)
EST. GFR  (AFRICAN AMERICAN): 47.5 ML/MIN/1.73 M^2
EST. GFR  (NON AFRICAN AMERICAN): 41.1 ML/MIN/1.73 M^2
ESTIMATED AVG GLUCOSE: 103 MG/DL (ref 68–131)
GLUCOSE SERPL-MCNC: 83 MG/DL (ref 70–110)
HBA1C MFR BLD HPLC: 5.2 % (ref 4–5.6)
HCT VFR BLD AUTO: 42.4 % (ref 40–54)
HDLC SERPL-MCNC: 83 MG/DL (ref 40–75)
HDLC SERPL: 44.9 % (ref 20–50)
HGB BLD-MCNC: 13.9 G/DL (ref 14–18)
IMM GRANULOCYTES # BLD AUTO: 0 K/UL (ref 0–0.04)
IMM GRANULOCYTES NFR BLD AUTO: 0 % (ref 0–0.5)
LDLC SERPL CALC-MCNC: 94.2 MG/DL (ref 63–159)
LYMPHOCYTES # BLD AUTO: 1 K/UL (ref 1–4.8)
LYMPHOCYTES NFR BLD: 33.1 % (ref 18–48)
MAGNESIUM SERPL-MCNC: 2.4 MG/DL (ref 1.6–2.6)
MCH RBC QN AUTO: 29 PG (ref 27–31)
MCHC RBC AUTO-ENTMCNC: 32.8 G/DL (ref 32–36)
MCV RBC AUTO: 89 FL (ref 82–98)
MONOCYTES # BLD AUTO: 0.3 K/UL (ref 0.3–1)
MONOCYTES NFR BLD: 9.5 % (ref 4–15)
NEUTROPHILS # BLD AUTO: 1.5 K/UL (ref 1.8–7.7)
NEUTROPHILS NFR BLD: 49.3 % (ref 38–73)
NONHDLC SERPL-MCNC: 102 MG/DL
NRBC BLD-RTO: 0 /100 WBC
PLATELET # BLD AUTO: 137 K/UL (ref 150–350)
PMV BLD AUTO: 10.5 FL (ref 9.2–12.9)
POTASSIUM SERPL-SCNC: 4.1 MMOL/L (ref 3.5–5.1)
PROT SERPL-MCNC: 7.8 G/DL (ref 6–8.4)
RBC # BLD AUTO: 4.79 M/UL (ref 4.6–6.2)
SODIUM SERPL-SCNC: 142 MMOL/L (ref 136–145)
TRIGL SERPL-MCNC: 39 MG/DL (ref 30–150)
VIT B12 SERPL-MCNC: 1371 PG/ML (ref 210–950)
WBC # BLD AUTO: 2.96 K/UL (ref 3.9–12.7)

## 2019-04-30 PROCEDURE — 83735 ASSAY OF MAGNESIUM: CPT

## 2019-04-30 PROCEDURE — 85025 COMPLETE CBC W/AUTO DIFF WBC: CPT

## 2019-04-30 PROCEDURE — 80053 COMPREHEN METABOLIC PANEL: CPT

## 2019-04-30 PROCEDURE — 36415 COLL VENOUS BLD VENIPUNCTURE: CPT

## 2019-04-30 PROCEDURE — 83036 HEMOGLOBIN GLYCOSYLATED A1C: CPT

## 2019-04-30 PROCEDURE — 82306 VITAMIN D 25 HYDROXY: CPT

## 2019-04-30 PROCEDURE — 82607 VITAMIN B-12: CPT

## 2019-04-30 PROCEDURE — 80061 LIPID PANEL: CPT

## 2019-05-08 DIAGNOSIS — M62.838 MUSCLE SPASM: ICD-10-CM

## 2019-05-08 RX ORDER — CYCLOBENZAPRINE HCL 10 MG
TABLET ORAL
Qty: 90 TABLET | Refills: 0 | Status: SHIPPED | OUTPATIENT
Start: 2019-05-08 | End: 2019-06-20 | Stop reason: SDUPTHER

## 2019-06-13 ENCOUNTER — TELEPHONE (OUTPATIENT)
Dept: SURGERY | Facility: CLINIC | Age: 69
End: 2019-06-13

## 2019-06-13 DIAGNOSIS — Z12.11 ENCOUNTER FOR SCREENING COLONOSCOPY: Primary | ICD-10-CM

## 2019-06-13 RX ORDER — POLYETHYLENE GLYCOL 3350, SODIUM SULFATE ANHYDROUS, SODIUM BICARBONATE, SODIUM CHLORIDE, POTASSIUM CHLORIDE 236; 22.74; 6.74; 5.86; 2.97 G/4L; G/4L; G/4L; G/4L; G/4L
4 POWDER, FOR SOLUTION ORAL ONCE
Qty: 4000 ML | Refills: 0 | Status: CANCELLED | OUTPATIENT
Start: 2019-06-13 | End: 2019-06-13

## 2019-06-13 NOTE — TELEPHONE ENCOUNTER
Pt called to schedule colonoscopy with Dr. Barrera-pt scheduled for 7/11/19 @ 9:00am-owb-prep instructions given to pt and mailed to pt.

## 2019-06-14 RX ORDER — POLYETHYLENE GLYCOL 3350, SODIUM SULFATE ANHYDROUS, SODIUM BICARBONATE, SODIUM CHLORIDE, POTASSIUM CHLORIDE 236; 22.74; 6.74; 5.86; 2.97 G/4L; G/4L; G/4L; G/4L; G/4L
4 POWDER, FOR SOLUTION ORAL ONCE
Qty: 4000 ML | Refills: 0 | Status: CANCELLED | OUTPATIENT
Start: 2019-06-14 | End: 2019-06-14

## 2019-06-18 RX ORDER — POLYETHYLENE GLYCOL 3350, SODIUM SULFATE ANHYDROUS, SODIUM BICARBONATE, SODIUM CHLORIDE, POTASSIUM CHLORIDE 236; 22.74; 6.74; 5.86; 2.97 G/4L; G/4L; G/4L; G/4L; G/4L
4 POWDER, FOR SOLUTION ORAL ONCE
Qty: 4000 ML | Refills: 0 | Status: SHIPPED | OUTPATIENT
Start: 2019-06-18 | End: 2019-06-18

## 2019-06-20 DIAGNOSIS — M62.838 MUSCLE SPASM: ICD-10-CM

## 2019-06-21 RX ORDER — CYCLOBENZAPRINE HCL 10 MG
TABLET ORAL
Qty: 90 TABLET | Refills: 0 | Status: SHIPPED | OUTPATIENT
Start: 2019-06-21 | End: 2022-06-16

## 2019-07-05 ENCOUNTER — PATIENT MESSAGE (OUTPATIENT)
Dept: SURGERY | Facility: CLINIC | Age: 69
End: 2019-07-05

## 2019-07-05 ENCOUNTER — TELEPHONE (OUTPATIENT)
Dept: SURGERY | Facility: CLINIC | Age: 69
End: 2019-07-05

## 2019-07-05 NOTE — TELEPHONE ENCOUNTER
Prep instructions emailed to patient.    ----- Message from Aletha Sears sent at 7/5/2019 12:34 PM CDT -----  Contact: Patient  Type: Patient Call Back    Who called: Patient    What is the request in detail: Pt is requesting that his prep instructions be sent to his e-mail [email or web address].      Can the clinic reply by [facility]? No      Would the patient rather a call back or a response via [facility]? Call back    Best call back number:[phone]    Additional Information: [email or web address]

## 2019-07-11 ENCOUNTER — ANESTHESIA EVENT (OUTPATIENT)
Dept: ENDOSCOPY | Facility: HOSPITAL | Age: 69
End: 2019-07-11
Payer: COMMERCIAL

## 2019-07-11 ENCOUNTER — HOSPITAL ENCOUNTER (OUTPATIENT)
Facility: HOSPITAL | Age: 69
Discharge: HOME | End: 2019-07-11
Attending: INTERNAL MEDICINE | Admitting: INTERNAL MEDICINE
Payer: COMMERCIAL

## 2019-07-11 ENCOUNTER — ANESTHESIA (OUTPATIENT)
Dept: ENDOSCOPY | Facility: HOSPITAL | Age: 69
End: 2019-07-11
Payer: COMMERCIAL

## 2019-07-11 ENCOUNTER — PATIENT MESSAGE (OUTPATIENT)
Dept: ENDOSCOPY | Facility: HOSPITAL | Age: 69
End: 2019-07-11

## 2019-07-11 VITALS
OXYGEN SATURATION: 100 % | BODY MASS INDEX: 23.49 KG/M2 | HEART RATE: 68 BPM | HEIGHT: 68 IN | RESPIRATION RATE: 20 BRPM | SYSTOLIC BLOOD PRESSURE: 120 MMHG | TEMPERATURE: 98 F | WEIGHT: 155 LBS | DIASTOLIC BLOOD PRESSURE: 72 MMHG

## 2019-07-11 DIAGNOSIS — Z12.11 COLON CANCER SCREENING: ICD-10-CM

## 2019-07-11 PROCEDURE — 25000003 PHARM REV CODE 250: Performed by: NURSE ANESTHETIST, CERTIFIED REGISTERED

## 2019-07-11 PROCEDURE — D9220A PRA ANESTHESIA: Mod: CRNA,,, | Performed by: NURSE ANESTHETIST, CERTIFIED REGISTERED

## 2019-07-11 PROCEDURE — D9220A PRA ANESTHESIA: ICD-10-PCS | Mod: ANES,,, | Performed by: ANESTHESIOLOGY

## 2019-07-11 PROCEDURE — G0121 COLON CA SCRN NOT HI RSK IND: HCPCS | Mod: ,,, | Performed by: INTERNAL MEDICINE

## 2019-07-11 PROCEDURE — G0121 COLON CA SCRN NOT HI RSK IND: ICD-10-PCS | Mod: ,,, | Performed by: INTERNAL MEDICINE

## 2019-07-11 PROCEDURE — 25000003 PHARM REV CODE 250: Performed by: ANESTHESIOLOGY

## 2019-07-11 PROCEDURE — 63600175 PHARM REV CODE 636 W HCPCS: Performed by: NURSE ANESTHETIST, CERTIFIED REGISTERED

## 2019-07-11 PROCEDURE — D9220A PRA ANESTHESIA: ICD-10-PCS | Mod: CRNA,,, | Performed by: NURSE ANESTHETIST, CERTIFIED REGISTERED

## 2019-07-11 PROCEDURE — G0121 COLON CA SCRN NOT HI RSK IND: HCPCS | Performed by: INTERNAL MEDICINE

## 2019-07-11 PROCEDURE — 37000008 HC ANESTHESIA 1ST 15 MINUTES: Performed by: INTERNAL MEDICINE

## 2019-07-11 PROCEDURE — 37000009 HC ANESTHESIA EA ADD 15 MINS: Performed by: INTERNAL MEDICINE

## 2019-07-11 PROCEDURE — D9220A PRA ANESTHESIA: Mod: ANES,,, | Performed by: ANESTHESIOLOGY

## 2019-07-11 RX ORDER — PROPOFOL 10 MG/ML
VIAL (ML) INTRAVENOUS
Status: DISCONTINUED | OUTPATIENT
Start: 2019-07-11 | End: 2019-07-11

## 2019-07-11 RX ORDER — LIDOCAINE HYDROCHLORIDE 20 MG/ML
INJECTION, SOLUTION EPIDURAL; INFILTRATION; INTRACAUDAL; PERINEURAL
Status: DISCONTINUED
Start: 2019-07-11 | End: 2019-07-11 | Stop reason: HOSPADM

## 2019-07-11 RX ORDER — SODIUM CHLORIDE, SODIUM LACTATE, POTASSIUM CHLORIDE, CALCIUM CHLORIDE 600; 310; 30; 20 MG/100ML; MG/100ML; MG/100ML; MG/100ML
INJECTION, SOLUTION INTRAVENOUS CONTINUOUS PRN
Status: DISCONTINUED | OUTPATIENT
Start: 2019-07-11 | End: 2019-07-11

## 2019-07-11 RX ORDER — SODIUM CHLORIDE 9 MG/ML
INJECTION, SOLUTION INTRAVENOUS ONCE
Status: COMPLETED | OUTPATIENT
Start: 2019-07-11 | End: 2019-07-11

## 2019-07-11 RX ORDER — LIDOCAINE HYDROCHLORIDE 10 MG/ML
INJECTION, SOLUTION INTRAVENOUS
Status: DISCONTINUED | OUTPATIENT
Start: 2019-07-11 | End: 2019-07-11

## 2019-07-11 RX ORDER — PROPOFOL 10 MG/ML
VIAL (ML) INTRAVENOUS
Status: COMPLETED
Start: 2019-07-11 | End: 2019-07-11

## 2019-07-11 RX ADMIN — PROPOFOL 20 MG: 10 INJECTION, EMULSION INTRAVENOUS at 09:07

## 2019-07-11 RX ADMIN — SODIUM CHLORIDE, SODIUM LACTATE, POTASSIUM CHLORIDE, AND CALCIUM CHLORIDE: .6; .31; .03; .02 INJECTION, SOLUTION INTRAVENOUS at 09:07

## 2019-07-11 RX ADMIN — LIDOCAINE HYDROCHLORIDE 50 MG: 10 INJECTION, SOLUTION INTRAVENOUS at 09:07

## 2019-07-11 RX ADMIN — SODIUM CHLORIDE: 0.9 INJECTION, SOLUTION INTRAVENOUS at 08:07

## 2019-07-11 RX ADMIN — PROPOFOL 100 MG: 10 INJECTION, EMULSION INTRAVENOUS at 09:07

## 2019-07-11 NOTE — DISCHARGE INSTRUCTIONS
Colonoscopy     A camera attached to a flexible tube with a viewing lens is used to take video pictures.     Colonoscopy is a test to view the inside of your lower digestive tract (colon and rectum). Sometimes it can show the last part of the small intestine (ileum). During the test, small pieces of tissue may be removed for testing. This is called a biopsy. Small growths, such as polyps, may also be removed.   Why is colonoscopy done?  The test is done to help look for colon cancer. And it can help find the source of abdominal pain, bleeding, and changes in bowel habits. It may be needed once a year, depending on factors such as your:  · Age  · Health history  · Family health history  · Symptoms  · Results from any prior colonoscopy  Risks and possible complications  These include:  · Bleeding               · A puncture or tear in the colon   · Risks of anesthesia  · A cancer lesion not being seen  Getting ready   To prepare for the test:  · Talk with your healthcare provider about the risks of the test (see below). Also ask your healthcare provider about alternatives to the test.  · Tell your healthcare provider about any medicines you take. Also tell him or her about any health conditions you may have.  · Make sure your rectum and colon are empty for the test. Follow the diet and bowel prep instructions exactly. If you dont, the test may need to be rescheduled.  · Plan for a friend or family member to drive you home after the test.     Colonoscopy provides an inside view of the entire colon.     You may discuss the results with your doctor right away or at a future visit.  During the test   The test is usually done in the hospital on an outpatient basis. This means you go home the same day. The procedure takes about 30 minutes. During that time:  · You are given relaxing (sedating) medicine through an IV line. You may be drowsy, or fully asleep.  · The healthcare provider will first give you a physical exam to  check for anal and rectal problems.  · Then the anus is lubricated and the scope inserted.  · If you are awake, you may have a feeling similar to needing to have a bowel movement. You may also feel pressure as air is pumped into the colon. Its OK to pass gas during the procedure.  · Biopsy, polyp removal, or other treatments may be done during the test.  After the test   You may have gas right after the test. It can help to try to pass it to help prevent later bloating. Your healthcare provider may discuss the results with you right away. Or you may need to schedule a follow-up visit to talk about the results. After the test, you can go back to your normal eating and other activities. You may be tired from the sedation and need to rest for a few hours.  Date Last Reviewed: 11/1/2016 © 2000-2017 [employer]. [address] [place] [address], [place] [address]. All rights reserved. This information is not intended as a substitute for professional medical care. Always follow your healthcare professional's instructions.

## 2019-07-11 NOTE — TRANSFER OF CARE
"Anesthesia Transfer of Care Note    Patient: Irais Mcneil    Procedure(s) Performed: Procedure(s) (LRB):  COLONOSCOPY (N/A)    Patient location: GI    Anesthesia Type: MAC    Transport from OR: Transported from OR on room air with adequate spontaneous ventilation    Post pain: adequate analgesia    Post assessment: no apparent anesthetic complications    Post vital signs: stable    Level of consciousness: awake    Nausea/Vomiting: no nausea/vomiting    Complications: none    Transfer of care protocol was followed      Last vitals:   Visit Vitals  BP (!) 92/53 (BP Location: Left arm, Patient Position: Lying)   Pulse 70   Temp 36.6 °C (97.9 °F) (Oral)   Resp 16   Ht 5' 8" (1.727 m)   Wt 70.3 kg (155 lb)   SpO2 100%   BMI 23.57 kg/m²     "

## 2019-07-11 NOTE — OR NURSING
Procedure and recovery completed. Dr. Barrera discussed results and discuss instruction given. Understanding verbalized. Ready for discharge.

## 2019-07-11 NOTE — [employer] PATIENT INSTRUCTIONS
Discharge Summary/Instructions after an Endoscopic Procedure  Patient Name: Irais Mcneil  Patient MRN: [id number]  Patient YOB: 1950 Thursday, July 11, 2019  Rosangela Barrera MD  RESTRICTIONS:  During your procedure today, you received medications for sedation.  These   medications may affect your judgment, balance and coordination.  Therefore,   for 24 hours, you have the following restrictions:   - DO NOT drive a car, operate machinery, make legal/financial decisions,   sign important papers or drink alcohol.    ACTIVITY:  Today: no heavy lifting, straining or running due to procedural   sedation/anesthesia.  The following day: return to full activity including work.  DIET:  Eat and drink normally unless instructed otherwise.     TREATMENT FOR COMMON SIDE EFFECTS:  - Mild abdominal pain, nausea, belching, bloating or excessive gas:  rest,   eat lightly and use a heating pad.  - Sore Throat: treat with throat lozenges and/or gargle with warm salt   water.  - Because air was used during the procedure, expelling large amounts of air   from your rectum or belching is normal.  - If a bowel prep was taken, you may not have a bowel movement for 1-3 days.    This is normal.  SYMPTOMS TO WATCH FOR AND REPORT TO YOUR PHYSICIAN:  1. Abdominal pain or bloating, other than gas cramps.  2. Chest pain.  3. Back pain.  4. Signs of infection such as: chills or fever occurring within 24 hours   after the procedure.  5. Rectal bleeding, which would show as bright red, maroon, or black stools.   (A tablespoon of blood from the rectum is not serious, especially if   hemorrhoids are present.)  6. Vomiting.  7. Weakness or dizziness.  GO DIRECTLY TO THE NEAREST EMERGENCY ROOM IF YOU HAVE ANY OF THE FOLLOWING:      Difficulty breathing              Chills and/or fever over 101 F   Persistent vomiting and/or vomiting blood   Severe abdominal pain   Severe chest pain   Black, tarry stools   Bleeding- more than one  tablespoon   Any other symptom or condition that you feel may need urgent attention  Your doctor recommends these additional instructions:  If any biopsies were taken, your doctors clinic will contact you in 1 to 2   weeks with any results.  - Discharge patient to home.   - Repeat colonoscopy in 10 years for screening purposes.  For questions, problems or results please call your physician - Rosangela Barrera MD at Work:  [phone].  [facility] can be reached at [phone]     IF A COMPLICATION OR EMERGENCY SITUATION ARISES AND YOU ARE UNABLE TO REACH   YOUR PHYSICIAN - GO DIRECTLY TO THE EMERGENCY ROOM.  Rosangela Barrera MD  7/11/2019 9:47:21 AM  This report has been verified and signed electronically.  [employer]

## 2019-07-11 NOTE — ANESTHESIA PREPROCEDURE EVALUATION
07/11/2019    Pre-operative evaluation for Procedure(s) (LRB):  COLONOSCOPY (N/A)    Irais Mcneil is a 68 y.o. male     Patient Active Problem List   Diagnosis    Shoulder pain    Hypertension    Hyperlipidemia    Chronic renal disease, stage II    Abnormal PSA    Atypical migraine    Dyslipidemia    Light headedness    Kidney disease due to severe high blood pressure    Vasospasm    Elevated PSA    MGUS (monoclonal gammopathy of unknown significance)    Leukopenia    Thrombocytopenia    History of Micaela-Barr virus infection    History of CMV    History of parvovirus B19 infection    Posterior vitreous detachment, left eye    Vitreous hemorrhage, left eye    Asteroid hyalosis, both eyes    Branch retinal vein occlusion of left eye with retinal neovascularization    Chronic pain    Colon cancer screening       Review of patient's allergies indicates:  No Known Allergies    No current facility-administered medications on file prior to encounter.      Current Outpatient Medications on File Prior to Encounter   Medication Sig Dispense Refill    acetaminophen (TYLENOL) 500 MG tablet Take 500 mg by mouth every 6 (six) hours as needed for Pain.      amLODIPine (NORVASC) 10 MG tablet TAKE 1 TABLET (10 MG TOTAL) BY MOUTH ONCE DAILY. (Patient taking differently: Take 10 mg by mouth nightly. ) 30 tablet 5    aspirin (ECOTRIN) 81 MG EC tablet Take by mouth.      atorvastatin (LIPITOR) 20 MG tablet TAKE 1 TABLET BY MOUTH EVERY DAY IN THE EVENING 30 tablet 1    folic acid (FOLVITE) 1 MG tablet TAKE 1 TABLET BY MOUTH EVERY DAY 90 tablet 0    gabapentin (NEURONTIN) 300 MG capsule Take 1 capsule (300 mg total) by mouth 3 (three) times daily. 90 capsule 2    hydrocortisone 2.5 % cream Apply topically 2 (two) times daily. 28 g 2    topiramate (TOPAMAX) 25 MG tablet TAKE 1 TABLET BY MOUTH  EVERY DAY 90 tablet 0    traMADol (ULTRAM) 50 mg tablet TAKE 1-2 TABLETS EVERY 6-8 HOURS AS NEEDED FOR PAIN 30 tablet 1         VITALS    Vitals:    07/11/19 0830   BP: (!) 144/74   Pulse: 61   Resp: 18   Temp: 36.1 °C (97 °F)         Anesthesia Evaluation    I have reviewed the Patient Summary Reports.     I have reviewed the Medications.     Review of Systems  Anesthesia Hx:  No problems with previous Anesthesia  History of prior surgery of interest to airway management or planning: Denies Family Hx of Anesthesia complications.   Denies Personal Hx of Anesthesia complications.   Social:  Non-Smoker    Cardiovascular:   Exercise tolerance: good Hypertension Denies MI.       hyperlipidemia    Pulmonary:  Pulmonary Normal    Renal/:   Chronic Renal Disease, CRI    Hepatic/GI:  Hepatic/GI Normal No Bowel Prep.    Musculoskeletal:  Spine Disorders: lumbar    Endocrine:  Endocrine Normal    Dermatological:  Skin Normal    Psych:  Psychiatric Normal           Physical Exam  General:  Well nourished       Chest/Lungs:  Chest/Lungs Findings:             Anesthesia Plan  Type of Anesthesia, risks & benefits discussed:  Anesthesia Type:  general  Patient's Preference:   Intra-op Monitoring Plan: standard ASA monitors  Intra-op Monitoring Plan Comments:   Post Op Pain Control Plan:   Post Op Pain Control Plan Comments:   Induction:   IV  Beta Blocker:  Patient is on a Beta-Blocker and has received one dose within the past 24 hours (No further documentation required).       Informed Consent: Patient understands risks and agrees with Anesthesia plan.  Questions answered. Anesthesia consent signed with patient.  ASA Score: 2     Day of Surgery Review of History & Physical:            Ready For Surgery From Anesthesia Perspective.

## 2019-07-11 NOTE — H&P
History and Physical      Chief complaints: Requesting screening colonscopy    History of Presenting Illness    Patient requesting colonoscopy.  Patient denies any abdominal pain, weight loss or blood in the stool.  There is no family history of colon cancer.    Review of Systems   Constitutional: Negative for fever and appetite change.   HENT: Negative for sore throat, trouble swallowing and neck pain.   Eyes: Negative for photophobia and visual disturbance.   Respiratory: Negative for wheezing.   Cardiovascular: Negative for chest pain and palpitations.   Gastrointestinal: See HPI for details   Musculoskeletal: Negative for joint swelling and arthralgias.   Skin: Negative for rash and wound.   Neurological: Negative for dizziness, tremors and weakness.   Hematological: Negative.   Psychiatric/Behavioral: Negative for suicidal ideas and behavioral problems.     Past Medical History:   Diagnosis Date    Back pain     Elevated PSA     Headache(784.0)     Hyperlipidemia     Hypertension     Other and unspecified hyperlipidemia     Renal disease     Stage II       Past Surgical History:   Procedure Laterality Date    BACK SURGERY      CATARACT EXTRACTION W/  INTRAOCULAR LENS IMPLANT Left 10/06/2008    yag 7/10/13    CATARACT EXTRACTION W/  INTRAOCULAR LENS IMPLANT Right 08/18/2008    yag 7/10/13    CYSTOSCOPY N/A 12/1/2015    [facility]    EYE SURGERY      bilat cataract    INJECTION, STEROID, EPIDURAL, TRANSFORAMINAL APPROACH Right 2/20/2019    [facility] at [facility]    INJECTION-STEROID-EPIDURAL-TRANSFORAMINAL Bilateral 4/25/2018    [facility] at [facility]    SHOULDER ARTHROSCOPY  2/8/12    rt shoulder    TRANSRECTAL ULTRASOUND GUIDED PROSTATE BIOPSY N/A 5/28/2018    [facility]amie Hill [facility] at [facility]    TRANSRECTAL ULTRASOUND GUIDED PROSTATE BIOPSY Bilateral 12/1/2015    Performed by Severo CEBALLOS  MD [facility] at [facility] OR       Family History   Problem Relation Age of Onset    Stroke Sister     Diabetes Sister     Hypertension Sister     Stroke Brother     Hypertension Brother     Hypertension Mother        Social History     Socioeconomic History    Marital status:      Spouse name: Not on file    Number of children: Not on file    Years of education: Not on file    Highest education level: Not on file   Occupational History    Not on file   Social Needs    Financial resource strain: Not on file    Food insecurity:     Worry: Not on file     Inability: Not on file    Transportation needs:     Medical: Not on file     Non-medical: Not on file   Tobacco Use    Smoking status: Never Smoker    Smokeless tobacco: Never Used   Substance and Sexual Activity    Alcohol use: No    Drug use: No    Sexual activity: Not on file   Lifestyle    Physical activity:     Days per week: Not on file     Minutes per session: Not on file    Stress: Not on file   Relationships    Social connections:     Talks on phone: Not on file     Gets together: Not on file     Attends [employer] service: Not on file     Active member of club or organization: Not on file     Attends meetings of clubs or organizations: Not on file     Relationship status: Not on file   Other Topics Concern    Not on file   Social History Narrative    Not on file       No current facility-administered medications for this encounter.        Review of patient's allergies indicates:  No Known Allergies    Objective:      Vitals:    07/11/19 0830   BP: (!) 144/74   Pulse: 61   Resp: 18   Temp: 97 °F (36.1 °C)     Physical Exam   Constitutional: Patient is oriented to person, place, and time. Appears well-nourished.   HENT:   Mouth/Throat: Oropharynx is clear and moist.   Eyes: Pupils are equal, round, and reactive to light.   Neck: Neck supple.   Cardiovascular: Normal heart sounds.   Pulmonary/Chest: Effort normal and breath sounds  normal.   Abdominal: Soft. Exhibits no mass. There is no tenderness. There is no guarding.   Musculoskeletal: Normal range of motion.   Lymphadenopathy: Has no cervical adenopathy.   Neurological:Alert and oriented to person, place, and time.   Skin: Skin is warm. No rash noted.   Psychiatric: Has a normal mood and affect.     Assessment:  Colon cancer screening    Plan:  Colonoscopy       I have reviewed the patient's medical history in detail and updated the computerized patient record

## 2020-05-04 ENCOUNTER — LAB VISIT (OUTPATIENT)
Dept: LAB | Facility: HOSPITAL | Age: 70
End: 2020-05-04
Attending: PSYCHIATRY & NEUROLOGY
Payer: COMMERCIAL

## 2020-05-04 DIAGNOSIS — D72.819 LEUKOPENIA, UNSPECIFIED TYPE: ICD-10-CM

## 2020-05-04 DIAGNOSIS — G43.009 ATYPICAL MIGRAINE: ICD-10-CM

## 2020-05-04 DIAGNOSIS — E78.5 DYSLIPIDEMIA: ICD-10-CM

## 2020-05-04 DIAGNOSIS — G89.29 OTHER CHRONIC PAIN: ICD-10-CM

## 2020-05-04 LAB
25(OH)D3+25(OH)D2 SERPL-MCNC: 52 NG/ML (ref 30–96)
ALBUMIN SERPL BCP-MCNC: 4.3 G/DL (ref 3.5–5.2)
ALP SERPL-CCNC: 79 U/L (ref 55–135)
ALT SERPL W/O P-5'-P-CCNC: 20 U/L (ref 10–44)
ANION GAP SERPL CALC-SCNC: 8 MMOL/L (ref 8–16)
AST SERPL-CCNC: 26 U/L (ref 10–40)
BASOPHILS # BLD AUTO: 0.02 K/UL (ref 0–0.2)
BASOPHILS NFR BLD: 0.8 % (ref 0–1.9)
BILIRUB SERPL-MCNC: 0.5 MG/DL (ref 0.1–1)
BUN SERPL-MCNC: 20 MG/DL (ref 8–23)
CALCIUM SERPL-MCNC: 9.4 MG/DL (ref 8.7–10.5)
CHLORIDE SERPL-SCNC: 108 MMOL/L (ref 95–110)
CO2 SERPL-SCNC: 27 MMOL/L (ref 23–29)
CREAT SERPL-MCNC: 1.4 MG/DL (ref 0.5–1.4)
CRP SERPL-MCNC: 0.6 MG/L (ref 0–8.2)
D DIMER PPP IA.FEU-MCNC: 0.53 MG/L FEU
DIFFERENTIAL METHOD: ABNORMAL
EOSINOPHIL # BLD AUTO: 0.2 K/UL (ref 0–0.5)
EOSINOPHIL NFR BLD: 5.8 % (ref 0–8)
ERYTHROCYTE [DISTWIDTH] IN BLOOD BY AUTOMATED COUNT: 13.8 % (ref 11.5–14.5)
ERYTHROCYTE [SEDIMENTATION RATE] IN BLOOD BY WESTERGREN METHOD: 14 MM/HR (ref 0–23)
EST. GFR  (AFRICAN AMERICAN): 59.7 ML/MIN/1.73 M^2
EST. GFR  (NON AFRICAN AMERICAN): 51.6 ML/MIN/1.73 M^2
ESTIMATED AVG GLUCOSE: 100 MG/DL (ref 68–131)
GLUCOSE SERPL-MCNC: 81 MG/DL (ref 70–110)
HBA1C MFR BLD HPLC: 5.1 % (ref 4–5.6)
HCT VFR BLD AUTO: 44.4 % (ref 40–54)
HGB BLD-MCNC: 14.3 G/DL (ref 14–18)
IMM GRANULOCYTES # BLD AUTO: 0 K/UL (ref 0–0.04)
IMM GRANULOCYTES NFR BLD AUTO: 0 % (ref 0–0.5)
LYMPHOCYTES # BLD AUTO: 0.8 K/UL (ref 1–4.8)
LYMPHOCYTES NFR BLD: 31.9 % (ref 18–48)
MAGNESIUM SERPL-MCNC: 2.1 MG/DL (ref 1.6–2.6)
MCH RBC QN AUTO: 28.9 PG (ref 27–31)
MCHC RBC AUTO-ENTMCNC: 32.2 G/DL (ref 32–36)
MCV RBC AUTO: 90 FL (ref 82–98)
MONOCYTES # BLD AUTO: 0.2 K/UL (ref 0.3–1)
MONOCYTES NFR BLD: 8.5 % (ref 4–15)
NEUTROPHILS # BLD AUTO: 1.4 K/UL (ref 1.8–7.7)
NEUTROPHILS NFR BLD: 53 % (ref 38–73)
NRBC BLD-RTO: 0 /100 WBC
PLATELET # BLD AUTO: 129 K/UL (ref 150–350)
PMV BLD AUTO: 11.7 FL (ref 9.2–12.9)
POTASSIUM SERPL-SCNC: 3.7 MMOL/L (ref 3.5–5.1)
PROT SERPL-MCNC: 7.9 G/DL (ref 6–8.4)
RBC # BLD AUTO: 4.94 M/UL (ref 4.6–6.2)
RHEUMATOID FACT SERPL-ACNC: 10 IU/ML (ref 0–15)
SODIUM SERPL-SCNC: 143 MMOL/L (ref 136–145)
VIT B12 SERPL-MCNC: 1782 PG/ML (ref 210–950)
WBC # BLD AUTO: 2.6 K/UL (ref 3.9–12.7)

## 2020-05-04 PROCEDURE — 85652 RBC SED RATE AUTOMATED: CPT

## 2020-05-04 PROCEDURE — 80053 COMPREHEN METABOLIC PANEL: CPT

## 2020-05-04 PROCEDURE — 85379 FIBRIN DEGRADATION QUANT: CPT

## 2020-05-04 PROCEDURE — 82306 VITAMIN D 25 HYDROXY: CPT

## 2020-05-04 PROCEDURE — 85025 COMPLETE CBC W/AUTO DIFF WBC: CPT

## 2020-05-04 PROCEDURE — 36415 COLL VENOUS BLD VENIPUNCTURE: CPT

## 2020-05-04 PROCEDURE — 86140 C-REACTIVE PROTEIN: CPT

## 2020-05-04 PROCEDURE — 83735 ASSAY OF MAGNESIUM: CPT

## 2020-05-04 PROCEDURE — 82607 VITAMIN B-12: CPT

## 2020-05-04 PROCEDURE — 86431 RHEUMATOID FACTOR QUANT: CPT

## 2020-05-04 PROCEDURE — 83036 HEMOGLOBIN GLYCOSYLATED A1C: CPT

## 2020-10-22 ENCOUNTER — LAB VISIT (OUTPATIENT)
Dept: LAB | Facility: HOSPITAL | Age: 70
End: 2020-10-22
Attending: PSYCHIATRY & NEUROLOGY
Payer: COMMERCIAL

## 2020-10-22 DIAGNOSIS — R63.0 ANOREXIA: ICD-10-CM

## 2020-10-22 DIAGNOSIS — R63.4 WEIGHT LOSS: ICD-10-CM

## 2020-10-22 LAB
BILIRUB UR QL STRIP: NEGATIVE
CLARITY UR: CLEAR
COLOR UR: YELLOW
GLUCOSE UR QL STRIP: NEGATIVE
HGB UR QL STRIP: NEGATIVE
KETONES UR QL STRIP: NEGATIVE
LEUKOCYTE ESTERASE UR QL STRIP: NEGATIVE
NITRITE UR QL STRIP: NEGATIVE
PH UR STRIP: 7 [PH] (ref 5–8)
PROT UR QL STRIP: NEGATIVE
SP GR UR STRIP: 1.01 (ref 1–1.03)
URN SPEC COLLECT METH UR: NORMAL

## 2020-10-22 PROCEDURE — 84443 ASSAY THYROID STIM HORMONE: CPT

## 2020-10-22 PROCEDURE — 82306 VITAMIN D 25 HYDROXY: CPT

## 2020-10-22 PROCEDURE — 85025 COMPLETE CBC W/AUTO DIFF WBC: CPT

## 2020-10-22 PROCEDURE — 80053 COMPREHEN METABOLIC PANEL: CPT

## 2020-10-22 PROCEDURE — 86677 HELICOBACTER PYLORI ANTIBODY: CPT

## 2020-10-22 PROCEDURE — 83735 ASSAY OF MAGNESIUM: CPT

## 2020-10-22 PROCEDURE — 84402 ASSAY OF FREE TESTOSTERONE: CPT

## 2020-10-22 PROCEDURE — 86140 C-REACTIVE PROTEIN: CPT

## 2020-10-22 PROCEDURE — 84153 ASSAY OF PSA TOTAL: CPT

## 2020-10-22 PROCEDURE — 81003 URINALYSIS AUTO W/O SCOPE: CPT

## 2020-10-22 PROCEDURE — 36415 COLL VENOUS BLD VENIPUNCTURE: CPT

## 2020-10-22 PROCEDURE — 84207 ASSAY OF VITAMIN B-6: CPT

## 2020-10-22 PROCEDURE — 83036 HEMOGLOBIN GLYCOSYLATED A1C: CPT

## 2020-10-22 PROCEDURE — 82607 VITAMIN B-12: CPT

## 2020-10-23 LAB
25(OH)D3+25(OH)D2 SERPL-MCNC: 47 NG/ML (ref 30–96)
ALBUMIN SERPL BCP-MCNC: 4.1 G/DL (ref 3.5–5.2)
ALP SERPL-CCNC: 78 U/L (ref 55–135)
ALT SERPL W/O P-5'-P-CCNC: 21 U/L (ref 10–44)
ANION GAP SERPL CALC-SCNC: 7 MMOL/L (ref 8–16)
AST SERPL-CCNC: 25 U/L (ref 10–40)
BASOPHILS # BLD AUTO: 0.02 K/UL (ref 0–0.2)
BASOPHILS NFR BLD: 0.6 % (ref 0–1.9)
BILIRUB SERPL-MCNC: 0.3 MG/DL (ref 0.1–1)
BUN SERPL-MCNC: 20 MG/DL (ref 8–23)
CALCIUM SERPL-MCNC: 9.5 MG/DL (ref 8.7–10.5)
CHLORIDE SERPL-SCNC: 105 MMOL/L (ref 95–110)
CO2 SERPL-SCNC: 30 MMOL/L (ref 23–29)
COMPLEXED PSA SERPL-MCNC: 8.3 NG/ML (ref 0–4)
CREAT SERPL-MCNC: 1.5 MG/DL (ref 0.5–1.4)
CRP SERPL-MCNC: 0.1 MG/L (ref 0–8.2)
DIFFERENTIAL METHOD: ABNORMAL
EOSINOPHIL # BLD AUTO: 0.2 K/UL (ref 0–0.5)
EOSINOPHIL NFR BLD: 6.5 % (ref 0–8)
ERYTHROCYTE [DISTWIDTH] IN BLOOD BY AUTOMATED COUNT: 13.8 % (ref 11.5–14.5)
EST. GFR  (AFRICAN AMERICAN): 54.9 ML/MIN/1.73 M^2
EST. GFR  (NON AFRICAN AMERICAN): 47.5 ML/MIN/1.73 M^2
ESTIMATED AVG GLUCOSE: 100 MG/DL (ref 68–131)
GLUCOSE SERPL-MCNC: 85 MG/DL (ref 70–110)
HBA1C MFR BLD HPLC: 5.1 % (ref 4–5.6)
HCT VFR BLD AUTO: 42.5 % (ref 40–54)
HGB BLD-MCNC: 13.5 G/DL (ref 14–18)
IMM GRANULOCYTES # BLD AUTO: 0.01 K/UL (ref 0–0.04)
IMM GRANULOCYTES NFR BLD AUTO: 0.3 % (ref 0–0.5)
LYMPHOCYTES # BLD AUTO: 1.1 K/UL (ref 1–4.8)
LYMPHOCYTES NFR BLD: 32.3 % (ref 18–48)
MAGNESIUM SERPL-MCNC: 2.2 MG/DL (ref 1.6–2.6)
MCH RBC QN AUTO: 29 PG (ref 27–31)
MCHC RBC AUTO-ENTMCNC: 31.8 G/DL (ref 32–36)
MCV RBC AUTO: 91 FL (ref 82–98)
MONOCYTES # BLD AUTO: 0.3 K/UL (ref 0.3–1)
MONOCYTES NFR BLD: 9.8 % (ref 4–15)
NEUTROPHILS # BLD AUTO: 1.6 K/UL (ref 1.8–7.7)
NEUTROPHILS NFR BLD: 50.5 % (ref 38–73)
NRBC BLD-RTO: 0 /100 WBC
PLATELET # BLD AUTO: 156 K/UL (ref 150–350)
PMV BLD AUTO: 12.1 FL (ref 9.2–12.9)
POTASSIUM SERPL-SCNC: 4.3 MMOL/L (ref 3.5–5.1)
PROT SERPL-MCNC: 7.6 G/DL (ref 6–8.4)
RBC # BLD AUTO: 4.65 M/UL (ref 4.6–6.2)
SODIUM SERPL-SCNC: 142 MMOL/L (ref 136–145)
TSH SERPL DL<=0.005 MIU/L-ACNC: 2.42 UIU/ML (ref 0.4–4)
VIT B12 SERPL-MCNC: 1430 PG/ML (ref 210–950)
WBC # BLD AUTO: 3.25 K/UL (ref 3.9–12.7)

## 2020-10-26 LAB
H PYLORI IGG SERPL QL IA: NEGATIVE
TESTOST FREE SERPL-MCNC: 4.9 PG/ML

## 2020-10-27 LAB — PYRIDOXAL SERPL-MCNC: 25 UG/L (ref 5–50)

## 2020-11-04 ENCOUNTER — CLINICAL SUPPORT (OUTPATIENT)
Dept: INTERNAL MEDICINE | Facility: CLINIC | Age: 70
End: 2020-11-04
Payer: COMMERCIAL

## 2020-11-04 DIAGNOSIS — Z23 NEED FOR VACCINATION FOR STREP PNEUMONIAE: Primary | ICD-10-CM

## 2020-11-04 PROCEDURE — 99999 PR PBB SHADOW E&M-EST. PATIENT-LVL II: ICD-10-PCS | Mod: PBBFAC,,,

## 2020-11-04 PROCEDURE — 99999 PR PBB SHADOW E&M-EST. PATIENT-LVL II: CPT | Mod: PBBFAC,,,

## 2020-11-20 ENCOUNTER — LAB VISIT (OUTPATIENT)
Dept: OTOLARYNGOLOGY | Facility: CLINIC | Age: 70
End: 2020-11-20
Payer: COMMERCIAL

## 2020-11-20 ENCOUNTER — TELEPHONE (OUTPATIENT)
Dept: GASTROENTEROLOGY | Facility: CLINIC | Age: 70
End: 2020-11-20

## 2020-11-20 ENCOUNTER — OFFICE VISIT (OUTPATIENT)
Dept: GASTROENTEROLOGY | Facility: CLINIC | Age: 70
End: 2020-11-20
Payer: COMMERCIAL

## 2020-11-20 VITALS
HEART RATE: 67 BPM | WEIGHT: 151.69 LBS | SYSTOLIC BLOOD PRESSURE: 120 MMHG | DIASTOLIC BLOOD PRESSURE: 78 MMHG | BODY MASS INDEX: 22.99 KG/M2 | HEIGHT: 68 IN

## 2020-11-20 DIAGNOSIS — R11.0 NAUSEA: ICD-10-CM

## 2020-11-20 DIAGNOSIS — R11.0 NAUSEA: Primary | ICD-10-CM

## 2020-11-20 DIAGNOSIS — R10.9 ABDOMINAL DISCOMFORT: ICD-10-CM

## 2020-11-20 PROCEDURE — 99999 PR PBB SHADOW E&M-EST. PATIENT-LVL III: CPT | Mod: PBBFAC,,, | Performed by: INTERNAL MEDICINE

## 2020-11-20 PROCEDURE — 99204 PR OFFICE/OUTPT VISIT, NEW, LEVL IV, 45-59 MIN: ICD-10-PCS | Mod: S$GLB,,, | Performed by: INTERNAL MEDICINE

## 2020-11-20 PROCEDURE — 1101F PR PT FALLS ASSESS DOC 0-1 FALLS W/OUT INJ PAST YR: ICD-10-PCS | Mod: CPTII,S$GLB,, | Performed by: INTERNAL MEDICINE

## 2020-11-20 PROCEDURE — 1101F PT FALLS ASSESS-DOCD LE1/YR: CPT | Mod: CPTII,S$GLB,, | Performed by: INTERNAL MEDICINE

## 2020-11-20 PROCEDURE — 1125F AMNT PAIN NOTED PAIN PRSNT: CPT | Mod: S$GLB,,, | Performed by: INTERNAL MEDICINE

## 2020-11-20 PROCEDURE — 3008F BODY MASS INDEX DOCD: CPT | Mod: CPTII,S$GLB,, | Performed by: INTERNAL MEDICINE

## 2020-11-20 PROCEDURE — 3074F SYST BP LT 130 MM HG: CPT | Mod: CPTII,S$GLB,, | Performed by: INTERNAL MEDICINE

## 2020-11-20 PROCEDURE — 3288F FALL RISK ASSESSMENT DOCD: CPT | Mod: CPTII,S$GLB,, | Performed by: INTERNAL MEDICINE

## 2020-11-20 PROCEDURE — 3008F PR BODY MASS INDEX (BMI) DOCUMENTED: ICD-10-PCS | Mod: CPTII,S$GLB,, | Performed by: INTERNAL MEDICINE

## 2020-11-20 PROCEDURE — 3078F PR MOST RECENT DIASTOLIC BLOOD PRESSURE < 80 MM HG: ICD-10-PCS | Mod: CPTII,S$GLB,, | Performed by: INTERNAL MEDICINE

## 2020-11-20 PROCEDURE — 3288F PR FALLS RISK ASSESSMENT DOCUMENTED: ICD-10-PCS | Mod: CPTII,S$GLB,, | Performed by: INTERNAL MEDICINE

## 2020-11-20 PROCEDURE — 3074F PR MOST RECENT SYSTOLIC BLOOD PRESSURE < 130 MM HG: ICD-10-PCS | Mod: CPTII,S$GLB,, | Performed by: INTERNAL MEDICINE

## 2020-11-20 PROCEDURE — 1125F PR PAIN SEVERITY QUANTIFIED, PAIN PRESENT: ICD-10-PCS | Mod: S$GLB,,, | Performed by: INTERNAL MEDICINE

## 2020-11-20 PROCEDURE — 99204 OFFICE O/P NEW MOD 45 MIN: CPT | Mod: S$GLB,,, | Performed by: INTERNAL MEDICINE

## 2020-11-20 PROCEDURE — U0003 INFECTIOUS AGENT DETECTION BY NUCLEIC ACID (DNA OR RNA); SEVERE ACUTE RESPIRATORY SYNDROME CORONAVIRUS 2 (SARS-COV-2) (CORONAVIRUS DISEASE [COVID-19]), AMPLIFIED PROBE TECHNIQUE, MAKING USE OF HIGH THROUGHPUT TECHNOLOGIES AS DESCRIBED BY CMS-2020-01-R: HCPCS

## 2020-11-20 PROCEDURE — 1159F PR MEDICATION LIST DOCUMENTED IN MEDICAL RECORD: ICD-10-PCS | Mod: S$GLB,,, | Performed by: INTERNAL MEDICINE

## 2020-11-20 PROCEDURE — 1159F MED LIST DOCD IN RCRD: CPT | Mod: S$GLB,,, | Performed by: INTERNAL MEDICINE

## 2020-11-20 PROCEDURE — 3078F DIAST BP <80 MM HG: CPT | Mod: CPTII,S$GLB,, | Performed by: INTERNAL MEDICINE

## 2020-11-20 PROCEDURE — 99999 PR PBB SHADOW E&M-EST. PATIENT-LVL III: ICD-10-PCS | Mod: PBBFAC,,, | Performed by: INTERNAL MEDICINE

## 2020-11-20 RX ORDER — TAMSULOSIN HYDROCHLORIDE 0.4 MG/1
1 CAPSULE ORAL DAILY
COMMUNITY
Start: 2020-11-13 | End: 2022-06-16

## 2020-11-20 NOTE — ASSESSMENT & PLAN NOTE
-Endorses abdominal bloating, early satiety and weight loss       Plan:   -Given the above symptoms, we will plan on EGD with biopsies.   -Colon cancer screening is up to date.   -Continue PPI

## 2020-11-20 NOTE — PROGRESS NOTES
"Clinic Consult:  [facility] Consultation Note    Reason for Consult:  The primary encounter diagnosis was Nausea. A diagnosis of Abdominal discomfort was also pertinent to this visit.    PCP: Sherwin Richardson   No address on file    HPI:  This is a 69 y.o. male here for evaluation of chronic nausea.     Patient states he has been experiencing nausea for 2-3 months.   His stomach "growls" occasionally.   Sometimes he has gas and abdominal bloating  He has a decrease appetite  He has lost 10 pounds in the past 6 months.   Warm foods, such as soup makes him feel better.   He has never had an EGD. Last colonoscopy was in 2019.       ROS:  Review of Systems   Constitutional: Positive for appetite change and unexpected weight change. Negative for chills, fatigue and fever.   HENT: Negative for trouble swallowing.    Eyes: Negative for pain.   Respiratory: Negative for shortness of breath.    Cardiovascular: Negative for chest pain.   Gastrointestinal: Positive for abdominal distention and nausea. Negative for abdominal pain, blood in stool, constipation, diarrhea and vomiting.   Musculoskeletal: Negative for arthralgias and myalgias.   Skin: Negative for color change.   Allergic/Immunologic: Negative for immunocompromised state.   Neurological: Negative for syncope, weakness and light-headedness.   Hematological: Negative for adenopathy.   Psychiatric/Behavioral: Negative for confusion.           Medical History:   Past Medical History:   Diagnosis Date    Back pain     Elevated PSA     Headache(784.0)     Hyperlipidemia     Hypertension     Other and unspecified hyperlipidemia     Renal disease     Stage II       Surgical History:  Past Surgical History:   Procedure Laterality Date    BACK SURGERY      CATARACT EXTRACTION W/  INTRAOCULAR LENS IMPLANT Left 10/06/2008    yag 7/10/13    CATARACT EXTRACTION W/  INTRAOCULAR LENS IMPLANT Right 08/18/2008    yag 7/10/13    COLONOSCOPY N/A 7/11/2019    " Procedure: COLONOSCOPY;  Surgeon: Rosangela Barrera MD;  Location: [facility] ENDO;  Service: Endoscopy;  Laterality: N/A;    EYE SURGERY      bilat cataract    SHOULDER ARTHROSCOPY  2/8/12    rt shoulder    TRANSFORAMINAL EPIDURAL INJECTION OF STEROID Right 2/20/2019    Procedure: INJECTION, STEROID, EPIDURAL, TRANSFORAMINAL APPROACH;  Surgeon: Felix Person MD;  Location: [facility] PAIN MGT;  Service: Pain Management;  Laterality: Right;  RIGHT TF [place] L3-4 and L5-S1    CONSENT NEEDED    TRANSRECTAL BIOPSY OF PROSTATE WITH ULTRASOUND GUIDANCE N/A 5/28/2018    Procedure: TRANSRECTAL ULTRASOUND GUIDED PROSTATE BIOPSY;  Surgeon: Jeramie Hill MD;  Location: [facility] OR;  Service: Urology;  Laterality: N/A;       Family History:   Family History   Problem Relation Age of Onset    Stroke Sister     Diabetes Sister     Hypertension Sister     Stroke Brother     Hypertension Brother     Hypertension Mother        Social History:   Social History     Tobacco Use    Smoking status: Never Smoker    Smokeless tobacco: Never Used   Substance Use Topics    Alcohol use: No    Drug use: No       Allergies: Reviewed    Home Medications:   Medication List with Changes/Refills   Current Medications    ACETAMINOPHEN (TYLENOL) 500 MG TABLET    Take 500 mg by mouth every 6 (six) hours as needed for Pain.    AMLODIPINE (NORVASC) 10 MG TABLET    TAKE 1 TABLET BY MOUTH EVERY DAY    ASPIRIN (ECOTRIN) 81 MG EC TABLET    Take by mouth.    ATORVASTATIN (LIPITOR) 20 MG TABLET    TAKE 1 TABLET BY MOUTH EVERY DAY IN THE EVENING    CYCLOBENZAPRINE (FLEXERIL) 10 MG TABLET    TAKE 1 TABLET BY MOUTH THREE TIMES A DAY AS NEEDED FOR MUSCLE SPASMS    ESOMEPRAZOLE (NEXIUM) 40 MG CAPSULE    Take 1 capsule (40 mg total) by mouth before breakfast.    FOLIC ACID (FOLVITE) 1 MG TABLET    TAKE 1 TABLET BY MOUTH EVERY DAY    HYDROCORTISONE 2.5 % CREAM    APPLY TO AFFECTED AREA TWICE A DAY    TAMSULOSIN (FLOMAX) 0.4 MG CAP    Take 1 capsule by mouth  "once daily.    TOPIRAMATE (TOPAMAX) 25 MG TABLET    TAKE 1 TABLET BY MOUTH EVERY DAY    TRAMADOL (ULTRAM) 50 MG TABLET    TAKE 1-2 TABLETS EVERY 6-8 HOURS AS NEEDED FOR PAIN   Discontinued Medications    ATORVASTATIN (LIPITOR) 20 MG TABLET    TAKE 1 TABLET BY MOUTH EVERY DAY IN THE EVENING    ATORVASTATIN (LIPITOR) 20 MG TABLET    TAKE 1 TABLET BY MOUTH EVERY DAY IN THE EVENING    GABAPENTIN (NEURONTIN) 300 MG CAPSULE    Take 1 capsule (300 mg total) by mouth 3 (three) times daily.    TOPIRAMATE (TOPAMAX) 25 MG TABLET    TAKE 1 TABLET BY MOUTH EVERY DAY    TRAMADOL (ULTRAM) 50 MG TABLET    TAKE 1-2 TABLETS BY MOUTH EVERY 6-8 HOURS AS NEEDED FOR PAIN         Physical Exam:  Vital Signs:  /78   Pulse 67   Ht 5' 8" (1.727 m)   Wt 68.8 kg (151 lb 10.8 oz)   BMI 23.06 kg/m²   Body mass index is 23.06 kg/m².    Physical Exam  Constitutional:       Appearance: Normal appearance. He is not toxic-appearing.   HENT:      Head: Normocephalic and atraumatic.      Mouth/Throat:      Pharynx: Oropharynx is clear.   Eyes:      General: No scleral icterus.     Extraocular Movements: Extraocular movements intact.      Conjunctiva/sclera: Conjunctivae normal.   Neck:      Musculoskeletal: Normal range of motion.   Cardiovascular:      Rate and Rhythm: Normal rate and regular rhythm.      Pulses: Normal pulses.      Heart sounds: Normal heart sounds. No murmur.   Pulmonary:      Effort: Pulmonary effort is normal.      Breath sounds: Normal breath sounds.   Abdominal:      General: There is no distension.      Palpations: Abdomen is soft.      Tenderness: There is no abdominal tenderness. There is no guarding.   Musculoskeletal:         General: No swelling or tenderness.   Skin:     General: Skin is warm and dry.      Coloration: Skin is not jaundiced.   Neurological:      General: No focal deficit present.      Mental Status: He is alert and oriented to person, place, and time. Mental status is at baseline.   Psychiatric:  "        Mood and Affect: Mood normal.          Labs: Pertinent labs reviewed.      Assessment:  Problem List Items Addressed This Visit        GI    Abdominal discomfort    Current Assessment & Plan     -Endorses abdominal bloating, early satiety and weight loss       Plan:   -Given the above symptoms, we will plan on EGD with biopsies.   -Colon cancer screening is up to date.   -Continue PPI            Other Visit Diagnoses     Nausea    -  Primary        Nausea  -     Case Request Endoscopy: EGD (ESOPHAGOGASTRODUODENOSCOPY)    Abdominal discomfort        Follow up in about 3 months (around 2/20/2021).    Order summary:  No orders of the defined types were placed in this encounter.      Thank you so much for allowing me to participate in the care of Irais Lane MD  Gastroenterology and Hepatology

## 2020-11-21 LAB — SARS-COV-2 RNA RESP QL NAA+PROBE: NOT DETECTED

## 2020-11-23 ENCOUNTER — ANESTHESIA EVENT (OUTPATIENT)
Dept: ENDOSCOPY | Facility: HOSPITAL | Age: 70
End: 2020-11-23
Payer: COMMERCIAL

## 2020-11-23 ENCOUNTER — HOSPITAL ENCOUNTER (OUTPATIENT)
Facility: HOSPITAL | Age: 70
Discharge: HOME | End: 2020-11-23
Attending: INTERNAL MEDICINE | Admitting: INTERNAL MEDICINE
Payer: COMMERCIAL

## 2020-11-23 ENCOUNTER — ANESTHESIA (OUTPATIENT)
Dept: ENDOSCOPY | Facility: HOSPITAL | Age: 70
End: 2020-11-23
Payer: COMMERCIAL

## 2020-11-23 VITALS
BODY MASS INDEX: 22.42 KG/M2 | WEIGHT: 147.94 LBS | RESPIRATION RATE: 20 BRPM | DIASTOLIC BLOOD PRESSURE: 54 MMHG | HEIGHT: 68 IN | HEART RATE: 55 BPM | TEMPERATURE: 98 F | SYSTOLIC BLOOD PRESSURE: 110 MMHG | OXYGEN SATURATION: 99 %

## 2020-11-23 DIAGNOSIS — R11.2 NAUSEA AND VOMITING: ICD-10-CM

## 2020-11-23 DIAGNOSIS — R10.9 ABDOMINAL DISCOMFORT: Primary | ICD-10-CM

## 2020-11-23 PROCEDURE — 63600175 PHARM REV CODE 636 W HCPCS: Performed by: NURSE ANESTHETIST, CERTIFIED REGISTERED

## 2020-11-23 PROCEDURE — 43239 EGD BIOPSY SINGLE/MULTIPLE: CPT | Mod: ,,, | Performed by: INTERNAL MEDICINE

## 2020-11-23 PROCEDURE — 43239 EGD BIOPSY SINGLE/MULTIPLE: CPT | Performed by: INTERNAL MEDICINE

## 2020-11-23 PROCEDURE — 43239 PR EGD, FLEX, W/BIOPSY, SGL/MULTI: ICD-10-PCS | Mod: ,,, | Performed by: INTERNAL MEDICINE

## 2020-11-23 PROCEDURE — 37000008 HC ANESTHESIA 1ST 15 MINUTES: Performed by: INTERNAL MEDICINE

## 2020-11-23 PROCEDURE — 27201012 HC FORCEPS, HOT/COLD, DISP: Performed by: INTERNAL MEDICINE

## 2020-11-23 PROCEDURE — 25000003 PHARM REV CODE 250: Performed by: NURSE ANESTHETIST, CERTIFIED REGISTERED

## 2020-11-23 PROCEDURE — 88305 TISSUE EXAM BY PATHOLOGIST: CPT | Performed by: PATHOLOGY

## 2020-11-23 PROCEDURE — 88305 TISSUE EXAM BY PATHOLOGIST: ICD-10-PCS | Mod: 26,,, | Performed by: PATHOLOGY

## 2020-11-23 PROCEDURE — 88305 TISSUE EXAM BY PATHOLOGIST: CPT | Mod: 26,,, | Performed by: PATHOLOGY

## 2020-11-23 RX ORDER — LIDOCAINE HCL/PF 100 MG/5ML
SYRINGE (ML) INTRAVENOUS
Status: DISCONTINUED | OUTPATIENT
Start: 2020-11-23 | End: 2020-11-23

## 2020-11-23 RX ORDER — PROPOFOL 10 MG/ML
INJECTION, EMULSION INTRAVENOUS
Status: DISCONTINUED | OUTPATIENT
Start: 2020-11-23 | End: 2020-11-23

## 2020-11-23 RX ORDER — SODIUM CHLORIDE 0.9 % (FLUSH) 0.9 %
10 SYRINGE (ML) INJECTION
Status: DISCONTINUED | OUTPATIENT
Start: 2020-11-23 | End: 2020-11-23 | Stop reason: HOSPADM

## 2020-11-23 RX ORDER — SODIUM CHLORIDE, SODIUM LACTATE, POTASSIUM CHLORIDE, CALCIUM CHLORIDE 600; 310; 30; 20 MG/100ML; MG/100ML; MG/100ML; MG/100ML
INJECTION, SOLUTION INTRAVENOUS CONTINUOUS PRN
Status: DISCONTINUED | OUTPATIENT
Start: 2020-11-23 | End: 2020-11-23

## 2020-11-23 RX ADMIN — PROPOFOL 100 MG: 10 INJECTION, EMULSION INTRAVENOUS at 01:11

## 2020-11-23 RX ADMIN — SODIUM CHLORIDE, SODIUM LACTATE, POTASSIUM CHLORIDE, AND CALCIUM CHLORIDE: 600; 310; 30; 20 INJECTION, SOLUTION INTRAVENOUS at 01:11

## 2020-11-23 RX ADMIN — LIDOCAINE HYDROCHLORIDE 50 MG: 20 INJECTION, SOLUTION INTRAVENOUS at 01:11

## 2020-11-23 RX ADMIN — PROPOFOL 120 MG: 10 INJECTION, EMULSION INTRAVENOUS at 01:11

## 2020-11-23 NOTE — TRANSFER OF CARE
"Anesthesia Transfer of Care Note    Patient: Irais Mcneil    Procedure(s) Performed: Procedure(s) (LRB):  EGD (ESOPHAGOGASTRODUODENOSCOPY) (N/A)    Patient location: PACU    Anesthesia Type: MAC    Transport from OR: Transported from OR on room air with adequate spontaneous ventilation    Post pain: adequate analgesia    Post assessment: no apparent anesthetic complications    Post vital signs: stable    Level of consciousness: awake    Complications: none    Transfer of care protocol was followed      Last vitals:   Visit Vitals  /79 (BP Location: Left arm, Patient Position: Lying)   Pulse (!) 56   Temp 37 °C (98.6 °F) (Temporal)   Resp 18   Ht 5' 8" (1.727 m)   Wt 67.1 kg (147 lb 14.9 oz)   BMI 22.49 kg/m²     "

## 2020-11-23 NOTE — PROVATION PATIENT INSTRUCTIONS
Discharge Summary/Instructions after an Endoscopic Procedure  Patient Name: Irais Mcneil  Patient MRN: [id number]  Patient YOB: 1950 Monday, November 23, 2020 Dev Lane MD  RESTRICTIONS:  During your procedure today, you received medications for sedation.  These   medications may affect your judgment, balance and coordination.  Therefore,   for 24 hours, you have the following restrictions:   - DO NOT drive a car, operate machinery, make legal/financial decisions,   sign important papers or drink alcohol.    ACTIVITY:  Today: no heavy lifting, straining or running due to procedural   sedation/anesthesia.  The following day: return to full activity including work.  DIET:  Eat and drink normally unless instructed otherwise.     TREATMENT FOR COMMON SIDE EFFECTS:  - Mild abdominal pain, nausea, belching, bloating or excessive gas:  rest,   eat lightly and use a heating pad.  - Sore Throat: treat with throat lozenges and/or gargle with warm salt   water.  - Because air was used during the procedure, expelling large amounts of air   from your rectum or belching is normal.  - If a bowel prep was taken, you may not have a bowel movement for 1-3 days.    This is normal.  SYMPTOMS TO WATCH FOR AND REPORT TO YOUR PHYSICIAN:  1. Abdominal pain or bloating, other than gas cramps.  2. Chest pain.  3. Back pain.  4. Signs of infection such as: chills or fever occurring within 24 hours   after the procedure.  5. Rectal bleeding, which would show as bright red, maroon, or black stools.   (A tablespoon of blood from the rectum is not serious, especially if   hemorrhoids are present.)  6. Vomiting.  7. Weakness or dizziness.  GO DIRECTLY TO THE NEAREST EMERGENCY ROOM IF YOU HAVE ANY OF THE FOLLOWING:      Difficulty breathing              Chills and/or fever over 101 F   Persistent vomiting and/or vomiting blood   Severe abdominal pain   Severe chest pain   Black, tarry stools   Bleeding- more than one  tablespoon   Any other symptom or condition that you feel may need urgent attention  Your doctor recommends these additional instructions:  If any biopsies were taken, your doctors clinic will contact you in 1 to 2   weeks with any results.  - Discharge patient to home.   - Resume previous diet.   - Continue present medications.   - Await pathology results.   - Return to referring physician.  For questions, problems or results please call your physician Dev Lane MD at Work:  [phone]  If you have any questions about the above instructions, call the GI   department at [phone] or call the endoscopy unit at [phone]   from 7am until 3 pm.  [facility] [place], EMERGENCY ROOM PHONE NUMBER:   [phone]  IF A COMPLICATION OR EMERGENCY SITUATION ARISES AND YOU ARE UNABLE TO REACH   YOUR PHYSICIAN - GO DIRECTLY TO THE EMERGENCY ROOM.  I have read or have had read to me these discharge instructions for my   procedure and have received a written copy.  I understand these   instructions and will follow-up with my physician if I have any questions.     __________________________________       _____________________________________  Nurse Signature                                          Patient/Designated   Responsible Party Signature  Dev Lane MD  11/23/2020 1:19:02 PM  This report has been verified and signed electronically.  [employer]

## 2020-11-23 NOTE — DISCHARGE INSTRUCTIONS

## 2020-11-23 NOTE — ANESTHESIA POSTPROCEDURE EVALUATION
Anesthesia Post Evaluation    Patient: Irais Mcneil    Procedure(s) Performed: Procedure(s) (LRB):  EGD (ESOPHAGOGASTRODUODENOSCOPY) (N/A)    Final Anesthesia Type: MAC    Patient location during evaluation: GI PACU  Patient participation: Yes- Able to Participate  Level of consciousness: awake and alert  Post-procedure vital signs: reviewed and stable  Pain management: adequate  Airway patency: patent  ARCHANA mitigation strategies: Multimodal analgesia  PONV status at discharge: No PONV  Anesthetic complications: no      Cardiovascular status: hemodynamically stable  Respiratory status: unassisted  Hydration status: euvolemic  Follow-up not needed.          Vitals Value Taken Time   /55 11/23/20 1330   Temp 36.5 °C (97.7 °F) 11/23/20 1320   Pulse 56 11/23/20 1330   Resp 20 11/23/20 1330   SpO2 99 % 11/23/20 1330         No case tracking events are documented in the log.      Pain/Seth Score: Seth Score: 10 (11/23/2020  1:30 PM)

## 2020-11-23 NOTE — PLAN OF CARE
DR HARRIS AT BEDSIDE TO SPEAK TO PT. REGARDING RESULTS.  VSS, NO GI BLEEDING, NO ABD. PAIN, NO N/V. PT. DISCHARGED FROM UNIT.

## 2020-11-23 NOTE — H&P
"PRE PROCEDURE H&P    Patient Name: Irais Mcneil  MRN: [id number]  : 1950  Date of Procedure:  2020  Referring Physician: Dev Lane MD  Primary Physician: Sherwin Richardson DO  Procedure Physician: Dev Lane MD       Planned Procedure: EGD  Diagnosis: nausea, early satiety  Chief Complaint: Same as above    HPI:   This is a 69 y.o. male here for evaluation of chronic nausea.      Patient states he has been experiencing nausea for 2-3 months.   His stomach "growls" occasionally.   Sometimes he has gas and abdominal bloating  He has a decrease appetite  He has lost 10 pounds in the past 6 months.   Warm foods, such as soup makes him feel better.   He has never had an EGD. Last colonoscopy was in 2019.     Past Medical History:   Past Medical History:   Diagnosis Date    Back pain     Elevated PSA     Headache(784.0)     Hyperlipidemia     Hypertension     Other and unspecified hyperlipidemia     Renal disease     Stage II        Past Surgical History:  Past Surgical History:   Procedure Laterality Date    BACK SURGERY      CATARACT EXTRACTION W/  INTRAOCULAR LENS IMPLANT Left 10/06/2008    yag 7/10/13    CATARACT EXTRACTION W/  INTRAOCULAR LENS IMPLANT Right 2008    yag 7/10/13    COLONOSCOPY N/A 2019    Procedure: COLONOSCOPY;  Surgeon: Rosangela Barrera MD;  Location: [facility];  Service: Endoscopy;  Laterality: N/A;    EYE SURGERY      bilat cataract    SHOULDER ARTHROSCOPY  12    rt shoulder    TRANSFORAMINAL EPIDURAL INJECTION OF STEROID Right 2019    Procedure: INJECTION, STEROID, EPIDURAL, TRANSFORAMINAL APPROACH;  Surgeon: Felix Person MD;  Location: [facility];  Service: Pain Management;  Laterality: Right;  RIGHT TF [place] L3-4 and L5-S1    CONSENT NEEDED    TRANSRECTAL BIOPSY OF PROSTATE WITH ULTRASOUND GUIDANCE N/A 2018    Procedure: TRANSRECTAL ULTRASOUND GUIDED PROSTATE BIOPSY;  Surgeon: Jeramie Hill MD;  " Location: [facility];  Service: Urology;  Laterality: N/A;        Home Medications:  Prior to Admission medications    Medication Sig Start Date End Date Taking? Authorizing Provider   amLODIPine (NORVASC) 10 MG tablet TAKE 1 TABLET BY MOUTH EVERY DAY 9/21/20  Yes Sherwin Richardson, DO   aspirin (ECOTRIN) 81 MG EC tablet Take by mouth.   Yes Historical Provider   atorvastatin (LIPITOR) 20 MG tablet TAKE 1 TABLET BY MOUTH EVERY DAY IN THE EVENING 11/11/19  Yes Sherwin Richardson, DO   esomeprazole (NEXIUM) 40 MG capsule Take 1 capsule (40 mg total) by mouth before breakfast. 11/8/20 11/8/21 Yes Sherwin Richardson, DO   folic acid (FOLVITE) 1 MG tablet TAKE 1 TABLET BY MOUTH EVERY DAY 10/27/20  Yes Sherwin Richardson, DO   tamsulosin (FLOMAX) 0.4 mg Cap Take 1 capsule by mouth once daily. 11/13/20  Yes Historical Provider   topiramate (TOPAMAX) 25 MG tablet TAKE 1 TABLET BY MOUTH EVERY DAY 3/31/19  Yes Sherwin Richardson,    acetaminophen (TYLENOL) 500 MG tablet Take 500 mg by mouth every 6 (six) hours as needed for Pain.    Historical Provider   cyclobenzaprine (FLEXERIL) 10 MG tablet TAKE 1 TABLET BY MOUTH THREE TIMES A DAY AS NEEDED FOR MUSCLE SPASMS  Patient not taking: Reported on 11/20/2020 6/21/19   Janell Robb PA-C   hydrocortisone 2.5 % cream APPLY TO AFFECTED AREA TWICE A DAY 8/5/20   Sherwin Richardson, DO   traMADol (ULTRAM) 50 mg tablet TAKE 1-2 TABLETS EVERY 6-8 HOURS AS NEEDED FOR PAIN 5/23/18   Sherwin Richardson, DO        Allergies:  Review of patient's allergies indicates:  No Known Allergies     Social History:   Social History     Socioeconomic History    Marital status:      Spouse name: Not on file    Number of children: Not on file    Years of education: Not on file    Highest education level: Not on file   Occupational History    Not on file   Social Needs    Financial resource strain: Not on file    Food insecurity     Worry: Not on file     Inability: Not on file    Transportation  "needs     Medical: Not on file     Non-medical: Not on file   Tobacco Use    Smoking status: Never Smoker    Smokeless tobacco: Never Used   Substance and Sexual Activity    Alcohol use: No    Drug use: No    Sexual activity: Not on file   Lifestyle    Physical activity     Days per week: Not on file     Minutes per session: Not on file    Stress: Not on file   Relationships    Social connections     Talks on phone: Not on file     Gets together: Not on file     Attends Holiness service: Not on file     Active member of club or organization: Not on file     Attends meetings of clubs or organizations: Not on file     Relationship status: Not on file   Other Topics Concern    Not on file   Social History Narrative    Not on file       Family History:  Family History   Problem Relation Age of Onset    Stroke Sister     Diabetes Sister     Hypertension Sister     Stroke Brother     Hypertension Brother     Hypertension Mother        ROS: No acute cardiac events, no acute respiratory complaints.     Physical Exam (all patients):    /79 (BP Location: Left arm, Patient Position: Lying)   Pulse (!) 56   Temp 98.6 °F (37 °C) (Temporal)   Resp 18   Ht 5' 8" (1.727 m)   Wt 67.1 kg (147 lb 14.9 oz)   BMI 22.49 kg/m²   Lungs: Clear to auscultation bilaterally, respirations unlabored  Heart: Regular rate and rhythm, S1 and S2 normal, no obvious murmurs  Abdomen:         Soft, non-tender, bowel sounds normal, no masses, no organomegaly    Lab Results   Component Value Date    WBC 3.25 (L) 10/22/2020    MCV 91 10/22/2020    RDW 13.8 10/22/2020     10/22/2020    INR 1.1 05/25/2018    GLU 85 10/22/2020    HGBA1C 5.1 10/22/2020    BUN 20 10/22/2020     10/22/2020    K 4.3 10/22/2020     10/22/2020        SEDATION PLAN: per anesthesia      History reviewed, vital signs satisfactory, cardiopulmonary status satisfactory, sedation options, risks and plans have been discussed with the patient " in front of endoscopy staff. All their questions were answered and the patient agrees to the sedation procedures as planned and the patient is deemed an appropriate candidate for the sedation as planned.    Procedure explained to patient, informed consent obtained and placed in chart.    Dev Lane  11/23/2020  1:00 PM

## 2020-11-23 NOTE — ANESTHESIA PREPROCEDURE EVALUATION
11/23/2020    Pre-operative evaluation for Procedure(s) (LRB):  COLONOSCOPY (N/A)    Irais Mcneil is a 69 y.o. male     Patient Active Problem List   Diagnosis    Shoulder pain    Hypertension    Hyperlipidemia    Chronic renal disease, stage II    Abnormal PSA    Atypical migraine    Dyslipidemia    Light headedness    Kidney disease due to severe high blood pressure    Vasospasm    Elevated PSA    MGUS (monoclonal gammopathy of unknown significance)    Leukopenia    Thrombocytopenia    History of Micaela-Barr virus infection    History of CMV    History of parvovirus B19 infection    Posterior vitreous detachment, left eye    Vitreous hemorrhage, left eye    Asteroid hyalosis, both eyes    Branch retinal vein occlusion of left eye with retinal neovascularization    Chronic pain    Colon cancer screening    Weight loss    Abdominal discomfort       Review of patient's allergies indicates:  No Known Allergies    Current Outpatient Medications on File Prior to Visit   Medication Sig Dispense Refill    acetaminophen (TYLENOL) 500 MG tablet Take 500 mg by mouth every 6 (six) hours as needed for Pain.      amLODIPine (NORVASC) 10 MG tablet TAKE 1 TABLET BY MOUTH EVERY DAY 30 tablet 11    aspirin (ECOTRIN) 81 MG EC tablet Take by mouth.      atorvastatin (LIPITOR) 20 MG tablet TAKE 1 TABLET BY MOUTH EVERY DAY IN THE EVENING 30 tablet 1    cyclobenzaprine (FLEXERIL) 10 MG tablet TAKE 1 TABLET BY MOUTH THREE TIMES A DAY AS NEEDED FOR MUSCLE SPASMS (Patient not taking: Reported on 11/20/2020) 90 tablet 0    esomeprazole (NEXIUM) 40 MG capsule Take 1 capsule (40 mg total) by mouth before breakfast. 30 capsule 5    folic acid (FOLVITE) 1 MG tablet TAKE 1 TABLET BY MOUTH EVERY DAY 90 tablet 0    hydrocortisone 2.5 % cream APPLY TO AFFECTED AREA TWICE A DAY 28.35 g 3    tamsulosin  (FLOMAX) 0.4 mg Cap Take 1 capsule by mouth once daily.      topiramate (TOPAMAX) 25 MG tablet TAKE 1 TABLET BY MOUTH EVERY DAY 90 tablet 0    traMADol (ULTRAM) 50 mg tablet TAKE 1-2 TABLETS EVERY 6-8 HOURS AS NEEDED FOR PAIN 30 tablet 1     No current facility-administered medications on file prior to visit.          VITALS    There were no vitals filed for this visit.      Pre-op Assessment    I have reviewed the Patient Summary Reports.     I have reviewed the Nursing Notes. I have reviewed the NPO Status.   I have reviewed the Medications.     Review of Systems  Anesthesia Hx:  No problems with previous Anesthesia  History of prior surgery of interest to airway management or planning: Denies Family Hx of Anesthesia complications.   Denies Personal Hx of Anesthesia complications.   Social:  Non-Smoker    Cardiovascular:   Exercise tolerance: good Hypertension Denies MI.       hyperlipidemia    Pulmonary:  Pulmonary Normal    Renal/:   Chronic Renal Disease, CRI    Hepatic/GI:  Hepatic/GI Normal No Bowel Prep.    Musculoskeletal:  Spine Disorders: lumbar    Endocrine:  Endocrine Normal    Dermatological:  Skin Normal    Psych:  Psychiatric Normal           Physical Exam  General:  Well nourished    Airway/Jaw/Neck:  Airway Findings: Mouth Opening: Normal Tongue: Normal  General Airway Assessment: Adult  Mallampati: II      Dental:  Dental Findings: Periodontal disease, Mild, In tact   Chest/Lungs:  Chest/Lungs Findings:        Mental Status:  Mental Status Findings:  Alert and Oriented         Anesthesia Plan  Type of Anesthesia, risks & benefits discussed:  Anesthesia Type:  general  Patient's Preference:   Intra-op Monitoring Plan: standard ASA monitors  Intra-op Monitoring Plan Comments:   Post Op Pain Control Plan: multimodal analgesia  Post Op Pain Control Plan Comments:   Induction:   IV  Beta Blocker:  Patient is on a Beta-Blocker and has received one dose within the past 24 hours (No further  documentation required).       Informed Consent: Patient understands risks and agrees with Anesthesia plan.  Questions answered. Anesthesia consent signed with patient.  ASA Score: 2     Day of Surgery Review of History & Physical: I have interviewed and examined the patient. I have reviewed the patient's H&P dated:  There are no significant changes.          Ready For Surgery From Anesthesia Perspective.

## 2020-12-02 LAB
FINAL PATHOLOGIC DIAGNOSIS: NORMAL
GROSS: NORMAL
Lab: NORMAL

## 2021-02-19 ENCOUNTER — OFFICE VISIT (OUTPATIENT)
Dept: URGENT CARE | Facility: CLINIC | Age: 71
End: 2021-02-19
Payer: COMMERCIAL

## 2021-02-19 VITALS
RESPIRATION RATE: 20 BRPM | OXYGEN SATURATION: 98 % | TEMPERATURE: 98 F | HEIGHT: 68 IN | SYSTOLIC BLOOD PRESSURE: 117 MMHG | BODY MASS INDEX: 22.28 KG/M2 | HEART RATE: 70 BPM | DIASTOLIC BLOOD PRESSURE: 64 MMHG | WEIGHT: 147 LBS

## 2021-02-19 DIAGNOSIS — R42 VERTIGO: Primary | ICD-10-CM

## 2021-02-19 PROCEDURE — 99214 OFFICE O/P EST MOD 30 MIN: CPT | Mod: S$GLB,,, | Performed by: NURSE PRACTITIONER

## 2021-02-19 PROCEDURE — 1125F PR PAIN SEVERITY QUANTIFIED, PAIN PRESENT: ICD-10-PCS | Mod: S$GLB,,, | Performed by: NURSE PRACTITIONER

## 2021-02-19 PROCEDURE — 3008F BODY MASS INDEX DOCD: CPT | Mod: CPTII,S$GLB,, | Performed by: NURSE PRACTITIONER

## 2021-02-19 PROCEDURE — 1125F AMNT PAIN NOTED PAIN PRSNT: CPT | Mod: S$GLB,,, | Performed by: NURSE PRACTITIONER

## 2021-02-19 PROCEDURE — 99214 PR OFFICE/OUTPT VISIT, EST, LEVL IV, 30-39 MIN: ICD-10-PCS | Mod: S$GLB,,, | Performed by: NURSE PRACTITIONER

## 2021-02-19 PROCEDURE — 3008F PR BODY MASS INDEX (BMI) DOCUMENTED: ICD-10-PCS | Mod: CPTII,S$GLB,, | Performed by: NURSE PRACTITIONER

## 2021-02-19 RX ORDER — MECLIZINE HYDROCHLORIDE 25 MG/1
25 TABLET ORAL EVERY 6 HOURS PRN
Qty: 30 TABLET | Refills: 0 | Status: SHIPPED | OUTPATIENT
Start: 2021-02-19 | End: 2021-03-11 | Stop reason: SDUPTHER

## 2021-02-22 ENCOUNTER — TELEPHONE (OUTPATIENT)
Dept: URGENT CARE | Facility: CLINIC | Age: 71
End: 2021-02-22

## 2021-05-14 ENCOUNTER — LAB VISIT (OUTPATIENT)
Dept: LAB | Facility: HOSPITAL | Age: 71
End: 2021-05-14
Attending: PSYCHIATRY & NEUROLOGY
Payer: COMMERCIAL

## 2021-05-14 DIAGNOSIS — R97.20 ABNORMAL PSA: ICD-10-CM

## 2021-05-14 DIAGNOSIS — G89.29 OTHER CHRONIC PAIN: ICD-10-CM

## 2021-05-14 PROCEDURE — 84154 ASSAY OF PSA FREE: CPT | Performed by: PSYCHIATRY & NEUROLOGY

## 2021-05-14 PROCEDURE — 36415 COLL VENOUS BLD VENIPUNCTURE: CPT | Performed by: PSYCHIATRY & NEUROLOGY

## 2021-05-14 PROCEDURE — 81003 URINALYSIS AUTO W/O SCOPE: CPT | Performed by: PSYCHIATRY & NEUROLOGY

## 2021-05-14 PROCEDURE — 82607 VITAMIN B-12: CPT | Performed by: PSYCHIATRY & NEUROLOGY

## 2021-05-14 PROCEDURE — 86140 C-REACTIVE PROTEIN: CPT | Performed by: PSYCHIATRY & NEUROLOGY

## 2021-05-14 PROCEDURE — 82306 VITAMIN D 25 HYDROXY: CPT | Performed by: PSYCHIATRY & NEUROLOGY

## 2021-05-14 PROCEDURE — 83036 HEMOGLOBIN GLYCOSYLATED A1C: CPT | Performed by: PSYCHIATRY & NEUROLOGY

## 2021-05-14 PROCEDURE — 83735 ASSAY OF MAGNESIUM: CPT | Performed by: PSYCHIATRY & NEUROLOGY

## 2021-05-14 PROCEDURE — 84443 ASSAY THYROID STIM HORMONE: CPT | Performed by: PSYCHIATRY & NEUROLOGY

## 2021-05-14 PROCEDURE — 84403 ASSAY OF TOTAL TESTOSTERONE: CPT | Performed by: PSYCHIATRY & NEUROLOGY

## 2021-05-14 PROCEDURE — 85025 COMPLETE CBC W/AUTO DIFF WBC: CPT | Performed by: PSYCHIATRY & NEUROLOGY

## 2021-05-14 PROCEDURE — 80053 COMPREHEN METABOLIC PANEL: CPT | Performed by: PSYCHIATRY & NEUROLOGY

## 2021-05-14 PROCEDURE — 84153 ASSAY OF PSA TOTAL: CPT | Performed by: PSYCHIATRY & NEUROLOGY

## 2021-05-14 PROCEDURE — 84207 ASSAY OF VITAMIN B-6: CPT | Performed by: PSYCHIATRY & NEUROLOGY

## 2021-05-15 LAB
25(OH)D3+25(OH)D2 SERPL-MCNC: 94 NG/ML (ref 30–96)
ALBUMIN SERPL BCP-MCNC: 4.1 G/DL (ref 3.5–5.2)
ALP SERPL-CCNC: 89 U/L (ref 55–135)
ALT SERPL W/O P-5'-P-CCNC: 19 U/L (ref 10–44)
ANION GAP SERPL CALC-SCNC: 8 MMOL/L (ref 8–16)
AST SERPL-CCNC: 26 U/L (ref 10–40)
BASOPHILS # BLD AUTO: 0.02 K/UL (ref 0–0.2)
BASOPHILS NFR BLD: 0.7 % (ref 0–1.9)
BILIRUB SERPL-MCNC: 0.4 MG/DL (ref 0.1–1)
BUN SERPL-MCNC: 21 MG/DL (ref 8–23)
CALCIUM SERPL-MCNC: 10 MG/DL (ref 8.7–10.5)
CHLORIDE SERPL-SCNC: 109 MMOL/L (ref 95–110)
CO2 SERPL-SCNC: 29 MMOL/L (ref 23–29)
CREAT SERPL-MCNC: 1.7 MG/DL (ref 0.5–1.4)
CRP SERPL-MCNC: 0.2 MG/L (ref 0–8.2)
DIFFERENTIAL METHOD: ABNORMAL
EOSINOPHIL # BLD AUTO: 0.2 K/UL (ref 0–0.5)
EOSINOPHIL NFR BLD: 7.1 % (ref 0–8)
ERYTHROCYTE [DISTWIDTH] IN BLOOD BY AUTOMATED COUNT: 13.8 % (ref 11.5–14.5)
EST. GFR  (AFRICAN AMERICAN): 46.9 ML/MIN/1.73 M^2
EST. GFR  (NON AFRICAN AMERICAN): 40.5 ML/MIN/1.73 M^2
ESTIMATED AVG GLUCOSE: 97 MG/DL (ref 68–131)
GLUCOSE SERPL-MCNC: 90 MG/DL (ref 70–110)
HBA1C MFR BLD: 5 % (ref 4–5.6)
HCT VFR BLD AUTO: 42.5 % (ref 40–54)
HGB BLD-MCNC: 14 G/DL (ref 14–18)
IMM GRANULOCYTES # BLD AUTO: 0 K/UL (ref 0–0.04)
IMM GRANULOCYTES NFR BLD AUTO: 0 % (ref 0–0.5)
LYMPHOCYTES # BLD AUTO: 1.1 K/UL (ref 1–4.8)
LYMPHOCYTES NFR BLD: 38.9 % (ref 18–48)
MAGNESIUM SERPL-MCNC: 2.2 MG/DL (ref 1.6–2.6)
MCH RBC QN AUTO: 29.3 PG (ref 27–31)
MCHC RBC AUTO-ENTMCNC: 32.9 G/DL (ref 32–36)
MCV RBC AUTO: 89 FL (ref 82–98)
MONOCYTES # BLD AUTO: 0.3 K/UL (ref 0.3–1)
MONOCYTES NFR BLD: 9.2 % (ref 4–15)
NEUTROPHILS # BLD AUTO: 1.3 K/UL (ref 1.8–7.7)
NEUTROPHILS NFR BLD: 44.1 % (ref 38–73)
NRBC BLD-RTO: 0 /100 WBC
PLATELET # BLD AUTO: 149 K/UL (ref 150–450)
PMV BLD AUTO: 11.6 FL (ref 9.2–12.9)
POTASSIUM SERPL-SCNC: 4.5 MMOL/L (ref 3.5–5.1)
PROSTATE SPECIFIC ANTIGEN, TOTAL: 9.5 NG/ML (ref 0–4)
PROT SERPL-MCNC: 8 G/DL (ref 6–8.4)
PSA FREE MFR SERPL: 24.21 %
PSA FREE SERPL-MCNC: 2.3 NG/ML (ref 0.01–1.5)
RBC # BLD AUTO: 4.78 M/UL (ref 4.6–6.2)
SODIUM SERPL-SCNC: 146 MMOL/L (ref 136–145)
TESTOST SERPL-MCNC: 519 NG/DL (ref 304–1227)
TSH SERPL DL<=0.005 MIU/L-ACNC: 3.24 UIU/ML (ref 0.4–4)
VIT B12 SERPL-MCNC: 1777 PG/ML (ref 210–950)
WBC # BLD AUTO: 2.83 K/UL (ref 3.9–12.7)

## 2021-05-20 LAB — PYRIDOXAL SERPL-MCNC: 23 UG/L (ref 5–50)

## 2021-12-06 PROBLEM — R35.0 INCREASED URINARY FREQUENCY: Status: ACTIVE | Noted: 2021-12-06

## 2021-12-07 ENCOUNTER — LAB VISIT (OUTPATIENT)
Dept: LAB | Facility: HOSPITAL | Age: 71
End: 2021-12-07
Attending: PSYCHIATRY & NEUROLOGY
Payer: COMMERCIAL

## 2021-12-07 DIAGNOSIS — G43.009 ATYPICAL MIGRAINE: ICD-10-CM

## 2021-12-07 DIAGNOSIS — R35.0 INCREASED URINARY FREQUENCY: ICD-10-CM

## 2021-12-07 PROCEDURE — 82607 VITAMIN B-12: CPT | Performed by: PSYCHIATRY & NEUROLOGY

## 2021-12-07 PROCEDURE — 85025 COMPLETE CBC W/AUTO DIFF WBC: CPT | Performed by: PSYCHIATRY & NEUROLOGY

## 2021-12-07 PROCEDURE — 80053 COMPREHEN METABOLIC PANEL: CPT | Performed by: PSYCHIATRY & NEUROLOGY

## 2021-12-07 PROCEDURE — 84153 ASSAY OF PSA TOTAL: CPT | Performed by: PSYCHIATRY & NEUROLOGY

## 2021-12-07 PROCEDURE — 36415 COLL VENOUS BLD VENIPUNCTURE: CPT | Performed by: PSYCHIATRY & NEUROLOGY

## 2021-12-07 PROCEDURE — 83036 HEMOGLOBIN GLYCOSYLATED A1C: CPT | Performed by: PSYCHIATRY & NEUROLOGY

## 2021-12-07 PROCEDURE — 81003 URINALYSIS AUTO W/O SCOPE: CPT | Performed by: PSYCHIATRY & NEUROLOGY

## 2021-12-08 LAB
ALBUMIN SERPL BCP-MCNC: 4 G/DL (ref 3.5–5.2)
ALBUMIN SERPL BCP-MCNC: 4 G/DL (ref 3.5–5.2)
ALP SERPL-CCNC: 73 U/L (ref 55–135)
ALP SERPL-CCNC: 73 U/L (ref 55–135)
ALT SERPL W/O P-5'-P-CCNC: 18 U/L (ref 10–44)
ALT SERPL W/O P-5'-P-CCNC: 18 U/L (ref 10–44)
ANION GAP SERPL CALC-SCNC: 7 MMOL/L (ref 8–16)
ANION GAP SERPL CALC-SCNC: 7 MMOL/L (ref 8–16)
AST SERPL-CCNC: 26 U/L (ref 10–40)
AST SERPL-CCNC: 26 U/L (ref 10–40)
BASOPHILS # BLD AUTO: 0.02 K/UL (ref 0–0.2)
BASOPHILS NFR BLD: 0.6 % (ref 0–1.9)
BILIRUB SERPL-MCNC: 0.4 MG/DL (ref 0.1–1)
BILIRUB SERPL-MCNC: 0.4 MG/DL (ref 0.1–1)
BUN SERPL-MCNC: 19 MG/DL (ref 8–23)
BUN SERPL-MCNC: 19 MG/DL (ref 8–23)
CALCIUM SERPL-MCNC: 9.9 MG/DL (ref 8.7–10.5)
CALCIUM SERPL-MCNC: 9.9 MG/DL (ref 8.7–10.5)
CHLORIDE SERPL-SCNC: 109 MMOL/L (ref 95–110)
CHLORIDE SERPL-SCNC: 109 MMOL/L (ref 95–110)
CO2 SERPL-SCNC: 29 MMOL/L (ref 23–29)
CO2 SERPL-SCNC: 29 MMOL/L (ref 23–29)
COMPLEXED PSA SERPL-MCNC: 10 NG/ML (ref 0–4)
COMPLEXED PSA SERPL-MCNC: 10 NG/ML (ref 0–4)
CREAT SERPL-MCNC: 1.5 MG/DL (ref 0.5–1.4)
CREAT SERPL-MCNC: 1.5 MG/DL (ref 0.5–1.4)
DIFFERENTIAL METHOD: ABNORMAL
EOSINOPHIL # BLD AUTO: 0.2 K/UL (ref 0–0.5)
EOSINOPHIL NFR BLD: 5.5 % (ref 0–8)
ERYTHROCYTE [DISTWIDTH] IN BLOOD BY AUTOMATED COUNT: 14.2 % (ref 11.5–14.5)
EST. GFR  (AFRICAN AMERICAN): 54.1 ML/MIN/1.73 M^2
EST. GFR  (AFRICAN AMERICAN): 54.1 ML/MIN/1.73 M^2
EST. GFR  (NON AFRICAN AMERICAN): 46.8 ML/MIN/1.73 M^2
EST. GFR  (NON AFRICAN AMERICAN): 46.8 ML/MIN/1.73 M^2
ESTIMATED AVG GLUCOSE: 100 MG/DL (ref 68–131)
ESTIMATED AVG GLUCOSE: 100 MG/DL (ref 68–131)
GLUCOSE SERPL-MCNC: 85 MG/DL (ref 70–110)
GLUCOSE SERPL-MCNC: 85 MG/DL (ref 70–110)
HBA1C MFR BLD: 5.1 % (ref 4–5.6)
HBA1C MFR BLD: 5.1 % (ref 4–5.6)
HCT VFR BLD AUTO: 44.9 % (ref 40–54)
HGB BLD-MCNC: 14.4 G/DL (ref 14–18)
IMM GRANULOCYTES # BLD AUTO: 0 K/UL (ref 0–0.04)
IMM GRANULOCYTES NFR BLD AUTO: 0 % (ref 0–0.5)
LYMPHOCYTES # BLD AUTO: 1.3 K/UL (ref 1–4.8)
LYMPHOCYTES NFR BLD: 36.8 % (ref 18–48)
MCH RBC QN AUTO: 29.4 PG (ref 27–31)
MCHC RBC AUTO-ENTMCNC: 32.1 G/DL (ref 32–36)
MCV RBC AUTO: 92 FL (ref 82–98)
MONOCYTES # BLD AUTO: 0.3 K/UL (ref 0.3–1)
MONOCYTES NFR BLD: 9 % (ref 4–15)
NEUTROPHILS # BLD AUTO: 1.7 K/UL (ref 1.8–7.7)
NEUTROPHILS NFR BLD: 48.1 % (ref 38–73)
NRBC BLD-RTO: 0 /100 WBC
PLATELET # BLD AUTO: 151 K/UL (ref 150–450)
PMV BLD AUTO: 12.2 FL (ref 9.2–12.9)
POTASSIUM SERPL-SCNC: 4.4 MMOL/L (ref 3.5–5.1)
POTASSIUM SERPL-SCNC: 4.4 MMOL/L (ref 3.5–5.1)
PROT SERPL-MCNC: 7.8 G/DL (ref 6–8.4)
PROT SERPL-MCNC: 7.8 G/DL (ref 6–8.4)
RBC # BLD AUTO: 4.9 M/UL (ref 4.6–6.2)
SODIUM SERPL-SCNC: 145 MMOL/L (ref 136–145)
SODIUM SERPL-SCNC: 145 MMOL/L (ref 136–145)
VIT B12 SERPL-MCNC: 1652 PG/ML (ref 210–950)
VIT B12 SERPL-MCNC: 1652 PG/ML (ref 210–950)
WBC # BLD AUTO: 3.45 K/UL (ref 3.9–12.7)

## 2021-12-09 ENCOUNTER — LAB VISIT (OUTPATIENT)
Dept: LAB | Facility: HOSPITAL | Age: 71
End: 2021-12-09
Attending: PSYCHIATRY & NEUROLOGY
Payer: COMMERCIAL

## 2021-12-09 DIAGNOSIS — E78.5 HYPERLIPIDEMIA, UNSPECIFIED HYPERLIPIDEMIA TYPE: ICD-10-CM

## 2021-12-09 PROCEDURE — 82465 ASSAY BLD/SERUM CHOLESTEROL: CPT | Performed by: PSYCHIATRY & NEUROLOGY

## 2021-12-09 PROCEDURE — 36415 COLL VENOUS BLD VENIPUNCTURE: CPT | Performed by: PSYCHIATRY & NEUROLOGY

## 2021-12-10 LAB — CHOLEST SERPL-MCNC: 189 MG/DL (ref 120–199)

## 2022-04-07 ENCOUNTER — TELEPHONE (OUTPATIENT)
Dept: HEMATOLOGY/ONCOLOGY | Facility: CLINIC | Age: 72
End: 2022-04-07
Payer: COMMERCIAL

## 2022-04-07 NOTE — TELEPHONE ENCOUNTER
Left message with Dr Khoobehi that the provider may call him to review pt records from this clinic in 2017 as Dr Cedillo is no longer employed by this facility. Dr Bermudez was provided with clinic back line number if needed      ----- Message from Yvette Mcclellan sent at 4/7/2022  1:07 PM CDT -----  Contact[email or web address] [place]  Type: Patient Call Back         Who called: Dr. Jose pena/ [facility] in [place]         What is the request in detail: patient was seen by Ping Cedillo; last seen in 4/2017; calling get questions answered regarding patient's med history in department; states patient is sched for surgery tmrw and doctor is requesting to speak with someone directly; please advise         Best call back number: [phone] - Dr. Jose pena/ [facility] in [place]         Additional Information:            Thank You

## 2022-05-24 PROBLEM — G44.89 OTHER HEADACHE SYNDROME: Status: ACTIVE | Noted: 2022-05-24

## 2022-05-25 ENCOUNTER — LAB VISIT (OUTPATIENT)
Dept: LAB | Facility: HOSPITAL | Age: 72
End: 2022-05-25
Attending: PSYCHIATRY & NEUROLOGY
Payer: COMMERCIAL

## 2022-05-25 DIAGNOSIS — G44.89 OTHER HEADACHE SYNDROME: ICD-10-CM

## 2022-05-25 LAB
BASOPHILS # BLD AUTO: 0.01 K/UL (ref 0–0.2)
BASOPHILS NFR BLD: 0.4 % (ref 0–1.9)
BILIRUB UR QL STRIP: NEGATIVE
CLARITY UR: CLEAR
COLOR UR: YELLOW
DIFFERENTIAL METHOD: ABNORMAL
EOSINOPHIL # BLD AUTO: 0.1 K/UL (ref 0–0.5)
EOSINOPHIL NFR BLD: 5.1 % (ref 0–8)
ERYTHROCYTE [DISTWIDTH] IN BLOOD BY AUTOMATED COUNT: 14.1 % (ref 11.5–14.5)
ERYTHROCYTE [SEDIMENTATION RATE] IN BLOOD BY WESTERGREN METHOD: 13 MM/HR (ref 0–23)
GLUCOSE UR QL STRIP: NEGATIVE
HCT VFR BLD AUTO: 42.7 % (ref 40–54)
HGB BLD-MCNC: 14 G/DL (ref 14–18)
HGB UR QL STRIP: NEGATIVE
IMM GRANULOCYTES # BLD AUTO: 0.01 K/UL (ref 0–0.04)
IMM GRANULOCYTES NFR BLD AUTO: 0.4 % (ref 0–0.5)
KETONES UR QL STRIP: NEGATIVE
LEUKOCYTE ESTERASE UR QL STRIP: NEGATIVE
LYMPHOCYTES # BLD AUTO: 1 K/UL (ref 1–4.8)
LYMPHOCYTES NFR BLD: 36.6 % (ref 18–48)
MCH RBC QN AUTO: 29.8 PG (ref 27–31)
MCHC RBC AUTO-ENTMCNC: 32.8 G/DL (ref 32–36)
MCV RBC AUTO: 91 FL (ref 82–98)
MONOCYTES # BLD AUTO: 0.2 K/UL (ref 0.3–1)
MONOCYTES NFR BLD: 8.8 % (ref 4–15)
NEUTROPHILS # BLD AUTO: 1.3 K/UL (ref 1.8–7.7)
NEUTROPHILS NFR BLD: 48.7 % (ref 38–73)
NITRITE UR QL STRIP: NEGATIVE
NRBC BLD-RTO: 0 /100 WBC
PH UR STRIP: 7 [PH] (ref 5–8)
PLATELET # BLD AUTO: 164 K/UL (ref 150–450)
PMV BLD AUTO: 11.2 FL (ref 9.2–12.9)
PROT UR QL STRIP: NEGATIVE
RBC # BLD AUTO: 4.7 M/UL (ref 4.6–6.2)
SP GR UR STRIP: 1.01 (ref 1–1.03)
URN SPEC COLLECT METH UR: NORMAL
WBC # BLD AUTO: 2.73 K/UL (ref 3.9–12.7)

## 2022-05-25 PROCEDURE — 80053 COMPREHEN METABOLIC PANEL: CPT | Performed by: PSYCHIATRY & NEUROLOGY

## 2022-05-25 PROCEDURE — 80201 ASSAY OF TOPIRAMATE: CPT | Performed by: PSYCHIATRY & NEUROLOGY

## 2022-05-25 PROCEDURE — 82607 VITAMIN B-12: CPT | Performed by: PSYCHIATRY & NEUROLOGY

## 2022-05-25 PROCEDURE — 86140 C-REACTIVE PROTEIN: CPT | Performed by: PSYCHIATRY & NEUROLOGY

## 2022-05-25 PROCEDURE — 85025 COMPLETE CBC W/AUTO DIFF WBC: CPT | Performed by: PSYCHIATRY & NEUROLOGY

## 2022-05-25 PROCEDURE — 83735 ASSAY OF MAGNESIUM: CPT | Performed by: PSYCHIATRY & NEUROLOGY

## 2022-05-25 PROCEDURE — 85652 RBC SED RATE AUTOMATED: CPT | Performed by: PSYCHIATRY & NEUROLOGY

## 2022-05-25 PROCEDURE — 81003 URINALYSIS AUTO W/O SCOPE: CPT | Performed by: PSYCHIATRY & NEUROLOGY

## 2022-05-25 PROCEDURE — 36415 COLL VENOUS BLD VENIPUNCTURE: CPT | Performed by: PSYCHIATRY & NEUROLOGY

## 2022-05-26 LAB
ALBUMIN SERPL BCP-MCNC: 4.1 G/DL (ref 3.5–5.2)
ALP SERPL-CCNC: 78 U/L (ref 55–135)
ALT SERPL W/O P-5'-P-CCNC: 12 U/L (ref 10–44)
ANION GAP SERPL CALC-SCNC: 9 MMOL/L (ref 8–16)
AST SERPL-CCNC: 21 U/L (ref 10–40)
BILIRUB SERPL-MCNC: 0.4 MG/DL (ref 0.1–1)
BUN SERPL-MCNC: 20 MG/DL (ref 8–23)
CALCIUM SERPL-MCNC: 9.5 MG/DL (ref 8.7–10.5)
CHLORIDE SERPL-SCNC: 106 MMOL/L (ref 95–110)
CO2 SERPL-SCNC: 28 MMOL/L (ref 23–29)
CREAT SERPL-MCNC: 1.3 MG/DL (ref 0.5–1.4)
CRP SERPL-MCNC: 0.3 MG/L (ref 0–8.2)
EST. GFR  (AFRICAN AMERICAN): >60 ML/MIN/1.73 M^2
EST. GFR  (NON AFRICAN AMERICAN): 55.7 ML/MIN/1.73 M^2
GLUCOSE SERPL-MCNC: 93 MG/DL (ref 70–110)
MAGNESIUM SERPL-MCNC: 2.2 MG/DL (ref 1.6–2.6)
POTASSIUM SERPL-SCNC: 4.1 MMOL/L (ref 3.5–5.1)
PROT SERPL-MCNC: 7.7 G/DL (ref 6–8.4)
SODIUM SERPL-SCNC: 143 MMOL/L (ref 136–145)
VIT B12 SERPL-MCNC: 950 PG/ML (ref 210–950)

## 2022-05-28 LAB — TOPIRAMATE SERPL-MCNC: <1 MCG/ML

## 2022-06-16 ENCOUNTER — HOSPITAL ENCOUNTER (EMERGENCY)
Facility: HOSPITAL | Age: 72
Discharge: HOME | End: 2022-06-16
Attending: EMERGENCY MEDICINE
Payer: COMMERCIAL

## 2022-06-16 VITALS
HEART RATE: 52 BPM | RESPIRATION RATE: 15 BRPM | BODY MASS INDEX: 22.01 KG/M2 | TEMPERATURE: 98 F | OXYGEN SATURATION: 99 % | DIASTOLIC BLOOD PRESSURE: 66 MMHG | SYSTOLIC BLOOD PRESSURE: 141 MMHG | WEIGHT: 144.75 LBS

## 2022-06-16 DIAGNOSIS — R41.0 CONFUSION: ICD-10-CM

## 2022-06-16 DIAGNOSIS — R41.82 ALTERED MENTAL STATUS: Primary | ICD-10-CM

## 2022-06-16 LAB
ALBUMIN SERPL BCP-MCNC: 3.9 G/DL (ref 3.5–5.2)
ALP SERPL-CCNC: 78 U/L (ref 55–135)
ALT SERPL W/O P-5'-P-CCNC: 17 U/L (ref 10–44)
ANION GAP SERPL CALC-SCNC: 8 MMOL/L (ref 8–16)
AST SERPL-CCNC: 22 U/L (ref 10–40)
BASOPHILS # BLD AUTO: 0.02 K/UL (ref 0–0.2)
BASOPHILS NFR BLD: 0.6 % (ref 0–1.9)
BILIRUB SERPL-MCNC: 0.3 MG/DL (ref 0.1–1)
BILIRUB UR QL STRIP: NEGATIVE
BUN SERPL-MCNC: 24 MG/DL (ref 8–23)
CALCIUM SERPL-MCNC: 9.9 MG/DL (ref 8.7–10.5)
CHLORIDE SERPL-SCNC: 104 MMOL/L (ref 95–110)
CLARITY UR: CLEAR
CO2 SERPL-SCNC: 29 MMOL/L (ref 23–29)
COLOR UR: YELLOW
CREAT SERPL-MCNC: 1.6 MG/DL (ref 0.5–1.4)
DIFFERENTIAL METHOD: ABNORMAL
EOSINOPHIL # BLD AUTO: 0.2 K/UL (ref 0–0.5)
EOSINOPHIL NFR BLD: 4.2 % (ref 0–8)
ERYTHROCYTE [DISTWIDTH] IN BLOOD BY AUTOMATED COUNT: 13.5 % (ref 11.5–14.5)
EST. GFR  (AFRICAN AMERICAN): 50 ML/MIN/1.73 M^2
EST. GFR  (NON AFRICAN AMERICAN): 43 ML/MIN/1.73 M^2
GLUCOSE SERPL-MCNC: 87 MG/DL (ref 70–110)
GLUCOSE UR QL STRIP: NEGATIVE
HCT VFR BLD AUTO: 41.2 % (ref 40–54)
HGB BLD-MCNC: 13.6 G/DL (ref 14–18)
HGB UR QL STRIP: NEGATIVE
IMM GRANULOCYTES # BLD AUTO: 0 K/UL (ref 0–0.04)
IMM GRANULOCYTES NFR BLD AUTO: 0 % (ref 0–0.5)
KETONES UR QL STRIP: NEGATIVE
LEUKOCYTE ESTERASE UR QL STRIP: NEGATIVE
LYMPHOCYTES # BLD AUTO: 1.2 K/UL (ref 1–4.8)
LYMPHOCYTES NFR BLD: 33.6 % (ref 18–48)
MCH RBC QN AUTO: 29.4 PG (ref 27–31)
MCHC RBC AUTO-ENTMCNC: 33 G/DL (ref 32–36)
MCV RBC AUTO: 89 FL (ref 82–98)
MONOCYTES # BLD AUTO: 0.4 K/UL (ref 0.3–1)
MONOCYTES NFR BLD: 10.1 % (ref 4–15)
NEUTROPHILS # BLD AUTO: 1.8 K/UL (ref 1.8–7.7)
NEUTROPHILS NFR BLD: 51.5 % (ref 38–73)
NITRITE UR QL STRIP: NEGATIVE
NRBC BLD-RTO: 0 /100 WBC
PH UR STRIP: 6 [PH] (ref 5–8)
PLATELET # BLD AUTO: 155 K/UL (ref 150–450)
PMV BLD AUTO: 9.4 FL (ref 9.2–12.9)
POCT GLUCOSE: 95 MG/DL (ref 70–110)
POTASSIUM SERPL-SCNC: 4.1 MMOL/L (ref 3.5–5.1)
PROT SERPL-MCNC: 7.9 G/DL (ref 6–8.4)
PROT UR QL STRIP: NEGATIVE
RBC # BLD AUTO: 4.63 M/UL (ref 4.6–6.2)
SODIUM SERPL-SCNC: 141 MMOL/L (ref 136–145)
SP GR UR STRIP: 1.01 (ref 1–1.03)
TROPONIN I SERPL DL<=0.01 NG/ML-MCNC: <0.006 NG/ML (ref 0–0.03)
URN SPEC COLLECT METH UR: NORMAL
UROBILINOGEN UR STRIP-ACNC: NEGATIVE EU/DL
WBC # BLD AUTO: 3.57 K/UL (ref 3.9–12.7)

## 2022-06-16 PROCEDURE — 80053 COMPREHEN METABOLIC PANEL: CPT | Performed by: NURSE PRACTITIONER

## 2022-06-16 PROCEDURE — 82962 GLUCOSE BLOOD TEST: CPT

## 2022-06-16 PROCEDURE — 85025 COMPLETE CBC W/AUTO DIFF WBC: CPT | Performed by: NURSE PRACTITIONER

## 2022-06-16 PROCEDURE — 93010 ELECTROCARDIOGRAM REPORT: CPT | Mod: ,,, | Performed by: INTERNAL MEDICINE

## 2022-06-16 PROCEDURE — 93005 ELECTROCARDIOGRAM TRACING: CPT

## 2022-06-16 PROCEDURE — 96360 HYDRATION IV INFUSION INIT: CPT

## 2022-06-16 PROCEDURE — 96361 HYDRATE IV INFUSION ADD-ON: CPT

## 2022-06-16 PROCEDURE — 93010 EKG 12-LEAD: ICD-10-PCS | Mod: ,,, | Performed by: INTERNAL MEDICINE

## 2022-06-16 PROCEDURE — 99285 EMERGENCY DEPT VISIT HI MDM: CPT | Mod: 25

## 2022-06-16 PROCEDURE — 84484 ASSAY OF TROPONIN QUANT: CPT | Performed by: NURSE PRACTITIONER

## 2022-06-16 PROCEDURE — 25000003 PHARM REV CODE 250: Performed by: EMERGENCY MEDICINE

## 2022-06-16 PROCEDURE — 81003 URINALYSIS AUTO W/O SCOPE: CPT | Performed by: NURSE PRACTITIONER

## 2022-06-16 RX ORDER — LIFITEGRAST 50 MG/ML
1 SOLUTION/ DROPS OPHTHALMIC 2 TIMES DAILY
COMMUNITY
Start: 2022-05-20

## 2022-06-16 RX ORDER — DOCUSATE SODIUM 100 MG/1
100 CAPSULE, LIQUID FILLED ORAL 2 TIMES DAILY
COMMUNITY

## 2022-06-16 RX ORDER — PREDNISOLONE ACETATE 10 MG/ML
SUSPENSION/ DROPS OPHTHALMIC
COMMUNITY
Start: 2022-03-16

## 2022-06-16 RX ORDER — OXYBUTYNIN CHLORIDE 10 MG/1
10 TABLET, EXTENDED RELEASE ORAL DAILY
COMMUNITY
Start: 2022-04-10 | End: 2022-12-14 | Stop reason: ALTCHOICE

## 2022-06-16 RX ADMIN — SODIUM CHLORIDE 1000 ML: 0.9 INJECTION, SOLUTION INTRAVENOUS at 09:06

## 2022-06-16 NOTE — FIRST PROVIDER EVALUATION
Medical screening exam completed.  I have conducted a focused provider triage encounter, findings are as follows:    Brief history of present illness:  Pt brought to the ER by his son who states patient has had increasingly worsening altered mental status over the last couple of months.    Vitals:    06/16/22 1821   BP: (!) 176/80   BP Location: Right arm   Patient Position: Sitting   Pulse: 83   Resp: 18   Temp: 98.3 °F (36.8 °C)   TempSrc: Oral   SpO2: 100%   Weight: 65.6 kg (144 lb 11.7 oz)       Pertinent physical exam:      Brief workup plan:      Preliminary workup initiated; this workup will be continued and followed by the physician or advanced practice provider that is assigned to the patient when roomed.

## 2022-06-17 NOTE — ED PROVIDER NOTES
SCRIBE #1 NOTE: I, Osbaldo Francis, am scribing for, and in the presence of, Alvaro Guevara MD. I have scribed the entire note.       History     Chief Complaint   Patient presents with    Altered Mental Status     Pt and his son report that the patient is having confusion, forgetfulness, difficulty driving, and difficulty working his phone.  Pt had prostate surgery in April and that his symptoms began after the surgery.  He has not been seen for this complaint prior to this visit.     Review of patient's allergies indicates:  No Known Allergies      History of Present Illness     HPI    6/16/2022, 9:12 PM  History obtained from the patient      History of Present Illness: Irais Mcneil is a 71 y.o. male patient with a PMHx of HTN and hyperlipidemia who presents to the Emergency Department for evaluation of altered mental status which onset gradually following prostate procedure in April of 2022, but has recently worsened over the past 3 weeks. Patient reports that his brother recently passed away and that he has been very stressed as of lately. Family member reports that pt has been recently having issues with memory, speech, and oftentimes will forget routes to destination while driving. Symptoms are constant, progressive, and moderate in severity. No mitigating factors reported. Associated sxs include anxiety, weakness, and fatigue. Patient denies any SOB, chest pain, dizziness, nausea, vomiting, diarrhea, and all other sxs at this time. No prior Tx reported. No further complaints or concerns at this time.       Arrival mode: Personal vehicle    PCP: Sherwin Richardson DO        Past Medical History:  Past Medical History:   Diagnosis Date    Back pain     Elevated PSA     Headache(784.0)     Hyperlipidemia     Hypertension     Other and unspecified hyperlipidemia     Renal disease     Stage II       Past Surgical History:  Past Surgical History:   Procedure Laterality Date    BACK SURGERY       CATARACT EXTRACTION W/  INTRAOCULAR LENS IMPLANT Left 10/06/2008    yag 7/10/13    CATARACT EXTRACTION W/  INTRAOCULAR LENS IMPLANT Right 08/18/2008    yag 7/10/13    COLONOSCOPY N/A 7/11/2019    Procedure: COLONOSCOPY;  Surgeon: Rosangela Barrera MD;  Location: [facility] ENDO;  Service: Endoscopy;  Laterality: N/A;    ESOPHAGOGASTRODUODENOSCOPY N/A 11/23/2020    Procedure: EGD (ESOPHAGOGASTRODUODENOSCOPY);  Surgeon: Dev Lane MD;  Location: [facility] ENDO;  Service: Gastroenterology;  Laterality: N/A;    EYE SURGERY      bilat cataract    SHOULDER ARTHROSCOPY  2/8/12    rt shoulder    TRANSFORAMINAL EPIDURAL INJECTION OF STEROID Right 2/20/2019    Procedure: INJECTION, STEROID, EPIDURAL, TRANSFORAMINAL APPROACH;  Surgeon: Felix Person MD;  [facility];  Service: Pain Management;  Laterality: Right;  RIGHT TF [place] L3-4 and L5-S1    CONSENT NEEDED    TRANSRECTAL BIOPSY OF PROSTATE WITH ULTRASOUND GUIDANCE N/A 5/28/2018    Procedure: TRANSRECTAL ULTRASOUND GUIDED PROSTATE BIOPSY;  Surgeon: Jeramie Hill MD;  Location: [facility] OR;  Service: Urology;  Laterality: N/A;         Family History:  Family History   Problem Relation Age of Onset    Stroke Sister     Diabetes Sister     Hypertension Sister     Stroke Brother     Hypertension Brother     Hypertension Mother        Social History:  Social History     Tobacco Use    Smoking status: Never Smoker    Smokeless tobacco: Never Used   Substance and Sexual Activity    Alcohol use: No    Drug use: No    Sexual activity: Not on file        Review of Systems     Review of Systems   Constitutional: Positive for fatigue. Negative for fever.        (+) Altered mental status   HENT: Negative for sore throat.    Respiratory: Negative for shortness of breath.    Cardiovascular: Negative for chest pain.   Gastrointestinal: Negative for diarrhea, nausea and vomiting.   Genitourinary: Negative for dysuria.   Musculoskeletal: Negative for  back pain.   Skin: Negative for rash.   Neurological: Positive for weakness (general). Negative for dizziness.   Hematological: Does not bruise/bleed easily.   Psychiatric/Behavioral: The patient is nervous/anxious.    All other systems reviewed and are negative.     Physical Exam     Initial Vitals [06/16/22 1821]   BP Pulse Resp Temp SpO2   (!) 176/80 83 18 98.3 °F (36.8 °C) 100 %      MAP       --          Physical Exam  Nursing Notes and Vital Signs Reviewed.  Constitutional: Patient is in no acute distress. Well-developed and well-nourished.  Head: Atraumatic. Normocephalic.  Eyes: PERRL. EOM intact. Conjunctivae are not pale. No scleral icterus.  ENT: Mucous membranes are moist. Oropharynx is clear and symmetric.    Neck: Supple. Full ROM. No lymphadenopathy.  Cardiovascular: Regular rate. Regular rhythm. No murmurs, rubs, or gallops. Distal pulses are 2+ and symmetric.  Pulmonary/Chest: No respiratory distress. Clear to auscultation bilaterally. No wheezing or rales.  Abdominal: Soft and non-distended.  There is no tenderness.  No rebound, guarding, or rigidity. Good bowel sounds.  Genitourinary: No CVA tenderness  Musculoskeletal: Moves all extremities. No obvious deformities. No edema. No calf tenderness.  Skin: Warm and dry.  Neurological:  Alert, awake, and appropriate.  Normal speech.  No acute focal neurological deficits are appreciated.  Psychiatric: Normal affect. Good eye contact. Appropriate in content.     ED Course   Procedures  ED Vital Signs:  Vitals:    06/16/22 1821 06/16/22 2046   BP: (!) 176/80 130/74   Pulse: 83 (!) 55   Resp: 18 20   Temp: 98.3 °F (36.8 °C)    TempSrc: Oral    SpO2: 100% 98%   Weight: 65.6 kg (144 lb 11.7 oz)        Abnormal Lab Results:  Labs Reviewed   CBC W/ AUTO DIFFERENTIAL - Abnormal; Notable for the following components:       Result Value    WBC 3.57 (*)     Hemoglobin 13.6 (*)     All other components within normal limits   COMPREHENSIVE METABOLIC PANEL -  Abnormal; Notable for the following components:    BUN 24 (*)     Creatinine 1.6 (*)     eGFR if  50 (*)     eGFR if non  43 (*)     All other components within normal limits   URINALYSIS, REFLEX TO URINE CULTURE    Narrative:     Specimen Source->Urine   TROPONIN I   POCT GLUCOSE   POCT GLUCOSE MONITORING CONTINUOUS        All Lab Results:  Results for orders placed or performed during the hospital encounter of 06/16/22   CBC auto differential   Result Value Ref Range    WBC 3.57 (L) 3.90 - 12.70 K/uL    RBC 4.63 4.60 - 6.20 M/uL    Hemoglobin 13.6 (L) 14.0 - 18.0 g/dL    Hematocrit 41.2 40.0 - 54.0 %    MCV 89 82 - 98 fL    MCH 29.4 27.0 - 31.0 pg    MCHC 33.0 32.0 - 36.0 g/dL    RDW 13.5 11.5 - 14.5 %    Platelets 155 150 - 450 K/uL    MPV 9.4 9.2 - 12.9 fL    Immature Granulocytes 0.0 0.0 - 0.5 %    Gran # (ANC) 1.8 1.8 - 7.7 K/uL    Immature Grans (Abs) 0.00 0.00 - 0.04 K/uL    Lymph # 1.2 1.0 - 4.8 K/uL    Mono # 0.4 0.3 - 1.0 K/uL    Eos # 0.2 0.0 - 0.5 K/uL    Baso # 0.02 0.00 - 0.20 K/uL    nRBC 0 0 /100 WBC    Gran % 51.5 38.0 - 73.0 %    Lymph % 33.6 18.0 - 48.0 %    Mono % 10.1 4.0 - 15.0 %    Eosinophil % 4.2 0.0 - 8.0 %    Basophil % 0.6 0.0 - 1.9 %    Differential Method Automated    Comprehensive metabolic panel   Result Value Ref Range    Sodium 141 136 - 145 mmol/L    Potassium 4.1 3.5 - 5.1 mmol/L    Chloride 104 95 - 110 mmol/L    CO2 29 23 - 29 mmol/L    Glucose 87 70 - 110 mg/dL    BUN 24 (H) 8 - 23 mg/dL    Creatinine 1.6 (H) 0.5 - 1.4 mg/dL    Calcium 9.9 8.7 - 10.5 mg/dL    Total Protein 7.9 6.0 - 8.4 g/dL    Albumin 3.9 3.5 - 5.2 g/dL    Total Bilirubin 0.3 0.1 - 1.0 mg/dL    Alkaline Phosphatase 78 55 - 135 U/L    AST 22 10 - 40 U/L    ALT 17 10 - 44 U/L    Anion Gap 8 8 - 16 mmol/L    eGFR if African American 50 (A) >60 mL/min/1.73 m^2    eGFR if non African American 43 (A) >60 mL/min/1.73 m^2   Urinalysis, Reflex to Urine Culture Urine, Clean Catch     Specimen: Urine   Result Value Ref Range    Specimen UA Urine, Clean Catch     Color, UA Yellow Yellow, Straw, Nataly    Appearance, UA Clear Clear    pH, UA 6.0 5.0 - 8.0    Specific Gravity, UA 1.010 1.005 - 1.030    Protein, UA Negative Negative    Glucose, UA Negative Negative    Ketones, UA Negative Negative    Bilirubin (UA) Negative Negative    Occult Blood UA Negative Negative    Nitrite, UA Negative Negative    Urobilinogen, UA Negative <2.0 EU/dL    Leukocytes, UA Negative Negative   Troponin I   Result Value Ref Range    Troponin I <0.006 0.000 - 0.026 ng/mL   POCT glucose   Result Value Ref Range    POCT Glucose 95 70 - 110 mg/dL         Imaging Results:  Imaging Results          X-Ray Chest 1 View (Final result)  Result time 06/16/22 19:05:10    Final result by Jack Montez MD (06/16/22 19:05:10)                 Impression:      No acute abnormality.      Electronically signed by: Jack Montez  Date:    06/16/2022  Time:    19:05             Narrative:    EXAMINATION:  XR CHEST 1 VIEW    CLINICAL HISTORY:  Altered mental status, unspecified    TECHNIQUE:  Single frontal view of the chest was performed.    COMPARISON:  01/17/2012    FINDINGS:  The lungs are clear, with normal appearance of pulmonary vasculature and no pleural effusion or pneumothorax.    The cardiac silhouette is normal in size. The hilar and mediastinal contours are unremarkable.    Bones are intact.                               CT Head Without Contrast (Final result)  Result time 06/16/22 19:01:43    Final result by Jack Montez MD (06/16/22 19:01:43)                 Impression:      No acute intracranial CT abnormality.  Clinical correlation and further evaluation as warranted.    All CT scans at this facility are performed  using dose modulation techniques as appropriate to performed exam including the following:  automated exposure control; adjustment of mA and/or kV according to the patients size (this includes  techniques or standardized protocols for targeted exams where dose is matched to indication/reason for exam: i.e. extremities or head);  iterative reconstruction technique.      Electronically signed by: Jack Montez  Date:    06/16/2022  Time:    19:01             Narrative:    EXAMINATION:  CT HEAD WITHOUT CONTRAST    CLINICAL HISTORY:  Mental status change, unknown cause;    TECHNIQUE:  Low dose axial CT images obtained throughout the head without intravenous contrast. Sagittal and coronal reconstructions were performed.    COMPARISON:  MRI brain 08/11/2008    FINDINGS:  Intracranial compartment:    Ventricles and sulci are normal in size for age without evidence of hydrocephalus. No extra-axial blood or fluid collections.    Mild microvascular ischemic change.  No parenchymal mass, hemorrhage, edema or major vascular distribution infarct.    Skull/extracranial contents (limited evaluation): No fracture. Mastoid air cells and paranasal sinuses are essentially clear.                                 The EKG was ordered, reviewed, and independently interpreted by the ED provider.  Interpretation time: 19:23  Rate: 58 BPM  Rhythm: sinus bradycardia  Interpretation: Minimal voltage criteria for LVH, may be normal variant. No STEMI.    The EKG was ordered, reviewed, and independently interpreted by the ED provider.  Interpretation time: 19:31  Rate: 56 BPM  Rhythm: sinus bradycardia  Interpretation: Nonspecific T wave abnormality. No STEMI.             The Emergency Provider reviewed the vital signs and test results, which are outlined above.     ED Discussion       10:16 PM: Discussed pt's case with Dr. Lilly (Neurology) who agrees with outpatient follow up.    10:17 PM: Reassessed pt at this time. Discussed with pt all pertinent ED information and results. Discussed pt dx and plan of tx. Gave pt all f/u and return to the ED instructions. All questions and concerns were addressed at this time. Pt expresses  understanding of information and instructions, and is comfortable with plan to discharge. Pt is stable for discharge.    I discussed with patient and/or family/caretaker that evaluation in the ED does not suggest any emergent or life threatening medical conditions requiring immediate intervention beyond what was provided in the ED, and I believe patient is safe for discharge.  Regardless, an unremarkable evaluation in the ED does not preclude the development or presence of a serious of life threatening condition. As such, patient was instructed to return immediately for any worsening or change in current symptoms.         Medical Decision Making:   Clinical Tests:   Lab Tests: Ordered and Reviewed  Radiological Study: Ordered and Reviewed  Medical Tests: Ordered and Reviewed           ED Medication(s):  Medications   sodium chloride 0.9% bolus 1,000 mL (1,000 mLs Intravenous New Bag 6/16/22 2122)       New Prescriptions    No medications on file        Follow-up Information     Neurology.    Why: call [phone] to arrange appt with Dr Lilly           PCP. Call in 3 days.    Why: As needed           [facility] - Emergency Dept..    Specialty: Emergency Medicine  Why: If symptoms worsen  Contact information:  [address] [facility] [address] [phone]                           Scribe Attestation:   Scribe #1: I performed the above scribed service and the documentation accurately describes the services I performed. I attest to the accuracy of the note.     Attending:   Physician Attestation Statement for Scribe #1: I, Alvaro Guevara MD, personally performed the services described in this documentation, as scribed by Osbaldo Francis, in my presence, and it is both accurate and complete.           Clinical Impression       ICD-10-CM ICD-9-CM   1. Altered mental status  R41.82 780.97   2. Confusion  R41.0 298.9       Disposition:   Disposition: Discharged  Condition: Stable          Alvaro Guevara MD  06/16/22 4032

## 2022-07-28 PROBLEM — F43.23 ADJUSTMENT REACTION WITH ANXIETY AND DEPRESSION: Status: ACTIVE | Noted: 2022-07-28

## 2022-09-28 ENCOUNTER — TELEPHONE (OUTPATIENT)
Dept: GASTROENTEROLOGY | Facility: CLINIC | Age: 72
End: 2022-09-28
Payer: COMMERCIAL

## 2022-09-28 NOTE — TELEPHONE ENCOUNTER
----- Message from Ekta Stoner sent at 9/28/2022 11:06 AM CDT -----  Contact: Patient, [phone]  Calling to speak with the nurse regarding his issues, blood in his stool. Please call him. Thanks.

## 2022-09-28 NOTE — TELEPHONE ENCOUNTER
Returned call to pt who stated he was unable to get an appointment with Dr Lane before December but only wants to see Dr Lane. Pt also stated that when he eats certain things he experiences some rectal bleeding (less than tablespoon). Pt would like to know if it would benefit him to have a Colonoscopy ordered. Please advise.

## 2022-10-13 ENCOUNTER — LAB VISIT (OUTPATIENT)
Dept: LAB | Facility: HOSPITAL | Age: 72
End: 2022-10-13
Attending: PSYCHIATRY & NEUROLOGY
Payer: COMMERCIAL

## 2022-10-13 DIAGNOSIS — G43.009 ATYPICAL MIGRAINE: ICD-10-CM

## 2022-10-13 PROCEDURE — 81003 URINALYSIS AUTO W/O SCOPE: CPT | Performed by: PSYCHIATRY & NEUROLOGY

## 2022-11-05 ENCOUNTER — OFFICE VISIT (OUTPATIENT)
Dept: URGENT CARE | Facility: CLINIC | Age: 72
End: 2022-11-05
Payer: COMMERCIAL

## 2022-11-05 VITALS
HEART RATE: 65 BPM | SYSTOLIC BLOOD PRESSURE: 192 MMHG | BODY MASS INDEX: 21.98 KG/M2 | OXYGEN SATURATION: 99 % | WEIGHT: 145 LBS | TEMPERATURE: 98 F | DIASTOLIC BLOOD PRESSURE: 93 MMHG | RESPIRATION RATE: 18 BRPM | HEIGHT: 68 IN

## 2022-11-05 DIAGNOSIS — I10 ESSENTIAL HYPERTENSION: Primary | ICD-10-CM

## 2022-11-05 PROCEDURE — 3077F SYST BP >= 140 MM HG: CPT | Mod: CPTII,S$GLB,, | Performed by: PHYSICIAN ASSISTANT

## 2022-11-05 PROCEDURE — 3077F PR MOST RECENT SYSTOLIC BLOOD PRESSURE >= 140 MM HG: ICD-10-PCS | Mod: CPTII,S$GLB,, | Performed by: PHYSICIAN ASSISTANT

## 2022-11-05 PROCEDURE — 1125F PR PAIN SEVERITY QUANTIFIED, PAIN PRESENT: ICD-10-PCS | Mod: CPTII,S$GLB,, | Performed by: PHYSICIAN ASSISTANT

## 2022-11-05 PROCEDURE — 99213 OFFICE O/P EST LOW 20 MIN: CPT | Mod: S$GLB,,, | Performed by: PHYSICIAN ASSISTANT

## 2022-11-05 PROCEDURE — 3080F DIAST BP >= 90 MM HG: CPT | Mod: CPTII,S$GLB,, | Performed by: PHYSICIAN ASSISTANT

## 2022-11-05 PROCEDURE — 3008F BODY MASS INDEX DOCD: CPT | Mod: CPTII,S$GLB,, | Performed by: PHYSICIAN ASSISTANT

## 2022-11-05 PROCEDURE — 1159F MED LIST DOCD IN RCRD: CPT | Mod: CPTII,S$GLB,, | Performed by: PHYSICIAN ASSISTANT

## 2022-11-05 PROCEDURE — 1160F PR REVIEW ALL MEDS BY PRESCRIBER/CLIN PHARMACIST DOCUMENTED: ICD-10-PCS | Mod: CPTII,S$GLB,, | Performed by: PHYSICIAN ASSISTANT

## 2022-11-05 PROCEDURE — 99213 PR OFFICE/OUTPT VISIT, EST, LEVL III, 20-29 MIN: ICD-10-PCS | Mod: S$GLB,,, | Performed by: PHYSICIAN ASSISTANT

## 2022-11-05 PROCEDURE — 1160F RVW MEDS BY RX/DR IN RCRD: CPT | Mod: CPTII,S$GLB,, | Performed by: PHYSICIAN ASSISTANT

## 2022-11-05 PROCEDURE — 3080F PR MOST RECENT DIASTOLIC BLOOD PRESSURE >= 90 MM HG: ICD-10-PCS | Mod: CPTII,S$GLB,, | Performed by: PHYSICIAN ASSISTANT

## 2022-11-05 PROCEDURE — 1125F AMNT PAIN NOTED PAIN PRSNT: CPT | Mod: CPTII,S$GLB,, | Performed by: PHYSICIAN ASSISTANT

## 2022-11-05 PROCEDURE — 1159F PR MEDICATION LIST DOCUMENTED IN MEDICAL RECORD: ICD-10-PCS | Mod: CPTII,S$GLB,, | Performed by: PHYSICIAN ASSISTANT

## 2022-11-05 PROCEDURE — 3008F PR BODY MASS INDEX (BMI) DOCUMENTED: ICD-10-PCS | Mod: CPTII,S$GLB,, | Performed by: PHYSICIAN ASSISTANT

## 2022-11-05 RX ORDER — AMLODIPINE BESYLATE 10 MG/1
10 TABLET ORAL DAILY
Qty: 30 TABLET | Refills: 0 | Status: SHIPPED | OUTPATIENT
Start: 2022-11-05 | End: 2022-12-05 | Stop reason: SDUPTHER

## 2022-11-05 NOTE — PROGRESS NOTES
"Subjective:       Patient ID: Irais Mcneil is a 71 y.o. male.    Vitals:  height is 5' 8" (1.727 m) and weight is 65.8 kg (145 lb). His oral temperature is 98.3 °F (36.8 °C). His blood pressure is 192/93 (abnormal) and his pulse is 65. His respiration is 18 and oxygen saturation is 99%.     Chief Complaint: Hypertension    Pt. Stated he has been out of his blood pressure meds for 3 days now.  He takes Amlodipine and is requesting a refill.  He has no complaints at this time and is asymptomatic.    Hypertension  This is a new problem. The current episode started in the past 7 days. The problem is unchanged. The problem is controlled. Pertinent negatives include no anxiety, blurred vision, chest pain, headaches, malaise/fatigue, neck pain, orthopnea, palpitations, peripheral edema, PND, shortness of breath or sweats. Treatments tried: rx meds. The current treatment provides significant improvement. There is no history of chronic renal disease, coarctation of the aorta, hyperaldosteronism, hypercortisolism, hyperparathyroidism, a hypertension causing med, pheochromocytoma, renovascular disease, sleep apnea or a thyroid problem.     Neck: Negative for neck pain.   Cardiovascular:  Negative for chest pain and palpitations.   Eyes:  Negative for blurred vision.   Respiratory:  Negative for shortness of breath.    Neurological:  Negative for headaches.     Objective:      Physical Exam   Constitutional: He is oriented to person, place, and time. He appears well-developed. He is cooperative. No distress.   HENT:   Head: Normocephalic and atraumatic.   Nose: Nose normal.   Mouth/Throat: Oropharynx is clear and moist and mucous membranes are normal.   Eyes: Conjunctivae and lids are normal.   Neck: Trachea normal and phonation normal. Neck supple.   Pulmonary/Chest: No respiratory distress.   Abdominal: Normal appearance.   Musculoskeletal:         General: No deformity.   Neurological: He is alert and oriented to " person, place, and time. He has normal strength and normal reflexes. No sensory deficit.   Skin: Skin is warm, dry, intact and not diaphoretic.   Psychiatric: His speech is normal and behavior is normal. Judgment and thought content normal.   Nursing note and vitals reviewed.      Assessment:       1. Essential hypertension          Plan:         Essential hypertension  -     amLODIPine (NORVASC) 10 MG tablet; Take 1 tablet (10 mg total) by mouth once daily.  Dispense: 30 tablet; Refill: 0       Will send in refill for Amlodipine for 30 days.  Will need to schedule a follow up visit with his pcp for additional refills.  All questions answered.

## 2022-12-29 ENCOUNTER — HOSPITAL ENCOUNTER (OUTPATIENT)
Dept: SLEEP MEDICINE | Facility: HOSPITAL | Age: 72
Discharge: HOME | End: 2022-12-29
Attending: PSYCHIATRY & NEUROLOGY
Payer: COMMERCIAL

## 2022-12-29 DIAGNOSIS — G47.59 SLEEP-RELATED HALLUCINATIONS: ICD-10-CM

## 2022-12-29 DIAGNOSIS — F51.04 PSYCHOPHYSIOLOGICAL INSOMNIA: Primary | ICD-10-CM

## 2022-12-29 DIAGNOSIS — Z72.821 INADEQUATE SLEEP HYGIENE: ICD-10-CM

## 2022-12-29 DIAGNOSIS — R06.83 PRIMARY SNORING: ICD-10-CM

## 2022-12-29 PROCEDURE — 95810 POLYSOM 6/> YRS 4/> PARAM: CPT

## 2022-12-30 PROBLEM — G47.8 INSOMNIA DISORDER, WITH OTHER SLEEP DISORDER, RECURRENT: Status: ACTIVE | Noted: 2022-12-30

## 2022-12-30 PROBLEM — G47.00 INSOMNIA DISORDER, WITH OTHER SLEEP DISORDER, RECURRENT: Status: ACTIVE | Noted: 2022-12-30

## 2023-01-01 NOTE — ANESTHESIA POSTPROCEDURE EVALUATION
Anesthesia Post Evaluation    Patient: Irais Mcneil    Procedure(s) Performed: Procedure(s) (LRB):  COLONOSCOPY (N/A)    Final Anesthesia Type: general  Patient location during evaluation: GI PACU  Patient participation: Yes- Able to Participate  Level of consciousness: awake and alert and oriented  Post-procedure vital signs: reviewed and stable  Pain management: adequate  Airway patency: patent  PONV status at discharge: No PONV  Anesthetic complications: no      Cardiovascular status: blood pressure returned to baseline, hemodynamically stable and stable  Respiratory status: unassisted, spontaneous ventilation and room air  Hydration status: euvolemic  Follow-up not needed.          Vitals Value Taken Time   /72 7/11/2019 10:14 AM   Temp 36.6 °C (97.9 °F) 7/11/2019  9:52 AM   Pulse 68 7/11/2019 10:14 AM   Resp 20 7/11/2019 10:14 AM   SpO2 100 % 7/11/2019 10:14 AM         Event Time     Out of Recovery 10:20:00          Pain/Seth Score: Seth Score: 10 (7/11/2019 10:14 AM)        
Statement Selected

## 2023-01-02 PROBLEM — F51.04 PSYCHOPHYSIOLOGICAL INSOMNIA: Status: ACTIVE | Noted: 2022-12-30

## 2023-01-02 PROCEDURE — 95810 POLYSOM 6/> YRS 4/> PARAM: CPT | Mod: 26,,, | Performed by: PSYCHOLOGIST

## 2023-01-02 PROCEDURE — 95810 PR POLYSOMNOGRAPHY, 4 OR MORE: ICD-10-PCS | Mod: 26,,, | Performed by: PSYCHOLOGIST

## 2023-01-02 NOTE — PROCEDURES
"Patient Name: Irais Mcneil   Date of Report: 23 Study Date:  2022  [facility] No.: [id number]   : 1950  Time of Study:  10:23:19 PM - 05:00:19 AM   Sex:  Male   Age:  72 year   Weight:  151.0 lbs   Height:  5' 7"  Type of Study:  Diagnostic    REASONS FOR REFERRAL: Mr. Mcneil is a 72 year year old male, referred for diagnostic polysomnography by Dr. Sherwin Richardson, based on the patient's reported frequently waking up after about 2 hrs sleep and inability to regain it.  He is having difficulty with his normal daily routine activities due to fatigue.  He also has been anxious and depressed, though both problems have improved recently with the use of venlafaxine / Effexor.  His [place] Sleepiness Scale score was 16, clinically significant.  There is additional concern about possible obstructive sleep apnea and seizure disorder.  Dr. Richardson is the patient's primary care physician.    STUDY PARAMETERS: This diagnostic study involved analysis of the patient's sleep pattern while breathing unassisted. The study was performed with a sleep technologist in attendance for the entire test period, with video monitoring throughout the study, and routine laboratory clinical parameters recorded:  NOTE:  This polysomnographic sleep study was reviewed epoch-by-epoch, interpreted and signed below by an American Academy of Sleep Medicine Board Certified Sleep Specialist    SUMMARY STATEMENTS  DIAGNOSTIC IMPRESSIONS  F51.04  /  307.42 Psychophysiological Insomnia (stress - related and conditioned; with depression and anxiety)    R06.83  /  780.53-1 Primary Snoring   R44.1    /  368.16   Sleep Related Hallucinations  Z72.821 / V69.4 Inadequate Sleep Hygiene  F51.12   / 307.44 r/o Insufficient Sleep Syndrome  G47.21  /  327.31 r/o Delayed Sleep - Wake Phase Disorder    TREATMENT RECOMMENDATIONS  Treat, or refer to [facility] Disorders Center.  The diagnostic polysomnography revealed no diagnosable obstructive sleep apnea / " hypopnea syndrome (A + H Index = 0.0 events / hr asleep with no respiratory event - related arousals or RERAs (respiratory effort -  related arousals) for the study.  The mean SpO2 value was 96.2 %, mild, minimum oxygen saturation during sleep was 93.0 %, and the maximum waking baseline SpO2 was   99.0 %.  Persistent, mild snoring was noted.  A CPAP titration polysomnography is not recommended.  If treatment of snoring (persistent, mild during the PSG) is desired, consider a reversible treatment such as a dental oral device.  If a permanent procedure such as UPPP is preferred, periodic polysomnography may be needed because signs of worsening apnea could be missed (silent apneas) if ARCHANA develops or becomes more severe.  The following changes in sleep hygiene / sleep - related behavior are recommended after medical treatments are successful  Set time for sleep to number of hours of sleep needed for adequate daytime functioning (7.5 to 9.0 hrs / night).  Regular bedtimes and wake times, including weekends: Total sleep time / night should not be more than one hour more than usual, and bedtime or wake time should not be more than one hour earlier or later than usual.    Do not attempt to make up lost sleep by extending sleep periods.    Avoid naps; none longer than 20 min or later than mid - afternoon.  Avoid meals or large snacks within 3 hours of bedtime.  If insomnia persists after treatments for medical sleep disorders have proven effective, recommend follow - up inquiry regarding frequency, duration and nature of reported sleep loss, delayed sleep onset, and involuntary early awakening (stress - related and / or conditioned psychophysiological insomnia,  r/o  delayed sleep - wake phase disorder) and referral for behavioral treatment of insomnia, as indicated.  Please see SDI.  Consider behavioral and cognitive / behavioral treatments for depression, anxiety, stress, hypnagogic hallucinations, and delayed sleep  phase, to complement the medication and to increase the probability of long - term adaptive change.  Sleep might be expected to further improve.    See below for a complete interpretation of data from the polysomnography and Sleep Disorders Inventory.     Thank you for referring this patient to the [facility].      Federico Abraham, Ph.D., ABPP; Diplomate, American Board of Sleep Medicine

## 2023-01-06 ENCOUNTER — LAB VISIT (OUTPATIENT)
Dept: LAB | Facility: HOSPITAL | Age: 73
End: 2023-01-06
Attending: PSYCHIATRY & NEUROLOGY
Payer: COMMERCIAL

## 2023-01-06 DIAGNOSIS — R97.20 ABNORMAL PSA: ICD-10-CM

## 2023-01-06 DIAGNOSIS — R35.0 INCREASED URINARY FREQUENCY: ICD-10-CM

## 2023-01-06 DIAGNOSIS — H57.13 EYE PAIN, BILATERAL: ICD-10-CM

## 2023-01-06 DIAGNOSIS — G43.009 ATYPICAL MIGRAINE: ICD-10-CM

## 2023-01-06 LAB
BILIRUB UR QL STRIP: NEGATIVE
CLARITY UR: CLEAR
COLOR UR: YELLOW
GLUCOSE UR QL STRIP: NEGATIVE
HGB UR QL STRIP: NEGATIVE
KETONES UR QL STRIP: NEGATIVE
LEUKOCYTE ESTERASE UR QL STRIP: NEGATIVE
NITRITE UR QL STRIP: NEGATIVE
PH UR STRIP: 8 [PH] (ref 5–8)
PROT UR QL STRIP: NEGATIVE
SP GR UR STRIP: 1.01 (ref 1–1.03)
URN SPEC COLLECT METH UR: NORMAL

## 2023-01-06 PROCEDURE — 81003 URINALYSIS AUTO W/O SCOPE: CPT | Performed by: PSYCHIATRY & NEUROLOGY

## 2023-01-09 ENCOUNTER — PATIENT MESSAGE (OUTPATIENT)
Dept: PULMONOLOGY | Facility: CLINIC | Age: 73
End: 2023-01-09
Payer: COMMERCIAL

## 2023-01-23 ENCOUNTER — OFFICE VISIT (OUTPATIENT)
Dept: PULMONOLOGY | Facility: CLINIC | Age: 73
End: 2023-01-23
Payer: COMMERCIAL

## 2023-01-23 VITALS
DIASTOLIC BLOOD PRESSURE: 82 MMHG | WEIGHT: 149.5 LBS | HEIGHT: 68 IN | OXYGEN SATURATION: 99 % | SYSTOLIC BLOOD PRESSURE: 120 MMHG | BODY MASS INDEX: 22.66 KG/M2 | RESPIRATION RATE: 16 BRPM | HEART RATE: 73 BPM

## 2023-01-23 DIAGNOSIS — Z72.820 LACK OF ADEQUATE SLEEP: ICD-10-CM

## 2023-01-23 DIAGNOSIS — F51.04 PSYCHOPHYSIOLOGICAL INSOMNIA: Primary | ICD-10-CM

## 2023-01-23 DIAGNOSIS — Z72.821 POOR SLEEP HYGIENE: ICD-10-CM

## 2023-01-23 PROCEDURE — 1160F PR REVIEW ALL MEDS BY PRESCRIBER/CLIN PHARMACIST DOCUMENTED: ICD-10-PCS | Mod: CPTII,S$GLB,, | Performed by: NURSE PRACTITIONER

## 2023-01-23 PROCEDURE — 99203 PR OFFICE/OUTPT VISIT, NEW, LEVL III, 30-44 MIN: ICD-10-PCS | Mod: S$GLB,,, | Performed by: NURSE PRACTITIONER

## 2023-01-23 PROCEDURE — 3074F SYST BP LT 130 MM HG: CPT | Mod: CPTII,S$GLB,, | Performed by: NURSE PRACTITIONER

## 2023-01-23 PROCEDURE — 1159F MED LIST DOCD IN RCRD: CPT | Mod: CPTII,S$GLB,, | Performed by: NURSE PRACTITIONER

## 2023-01-23 PROCEDURE — 99203 OFFICE O/P NEW LOW 30 MIN: CPT | Mod: S$GLB,,, | Performed by: NURSE PRACTITIONER

## 2023-01-23 PROCEDURE — 3288F PR FALLS RISK ASSESSMENT DOCUMENTED: ICD-10-PCS | Mod: CPTII,S$GLB,, | Performed by: NURSE PRACTITIONER

## 2023-01-23 PROCEDURE — 3288F FALL RISK ASSESSMENT DOCD: CPT | Mod: CPTII,S$GLB,, | Performed by: NURSE PRACTITIONER

## 2023-01-23 PROCEDURE — 1125F AMNT PAIN NOTED PAIN PRSNT: CPT | Mod: CPTII,S$GLB,, | Performed by: NURSE PRACTITIONER

## 2023-01-23 PROCEDURE — 3008F PR BODY MASS INDEX (BMI) DOCUMENTED: ICD-10-PCS | Mod: CPTII,S$GLB,, | Performed by: NURSE PRACTITIONER

## 2023-01-23 PROCEDURE — 1101F PR PT FALLS ASSESS DOC 0-1 FALLS W/OUT INJ PAST YR: ICD-10-PCS | Mod: CPTII,S$GLB,, | Performed by: NURSE PRACTITIONER

## 2023-01-23 PROCEDURE — 99999 PR PBB SHADOW E&M-EST. PATIENT-LVL V: CPT | Mod: PBBFAC,,, | Performed by: NURSE PRACTITIONER

## 2023-01-23 PROCEDURE — 1159F PR MEDICATION LIST DOCUMENTED IN MEDICAL RECORD: ICD-10-PCS | Mod: CPTII,S$GLB,, | Performed by: NURSE PRACTITIONER

## 2023-01-23 PROCEDURE — 1160F RVW MEDS BY RX/DR IN RCRD: CPT | Mod: CPTII,S$GLB,, | Performed by: NURSE PRACTITIONER

## 2023-01-23 PROCEDURE — 3079F DIAST BP 80-89 MM HG: CPT | Mod: CPTII,S$GLB,, | Performed by: NURSE PRACTITIONER

## 2023-01-23 PROCEDURE — 99999 PR PBB SHADOW E&M-EST. PATIENT-LVL V: ICD-10-PCS | Mod: PBBFAC,,, | Performed by: NURSE PRACTITIONER

## 2023-01-23 PROCEDURE — 3079F PR MOST RECENT DIASTOLIC BLOOD PRESSURE 80-89 MM HG: ICD-10-PCS | Mod: CPTII,S$GLB,, | Performed by: NURSE PRACTITIONER

## 2023-01-23 PROCEDURE — 3008F BODY MASS INDEX DOCD: CPT | Mod: CPTII,S$GLB,, | Performed by: NURSE PRACTITIONER

## 2023-01-23 PROCEDURE — 3074F PR MOST RECENT SYSTOLIC BLOOD PRESSURE < 130 MM HG: ICD-10-PCS | Mod: CPTII,S$GLB,, | Performed by: NURSE PRACTITIONER

## 2023-01-23 PROCEDURE — 1125F PR PAIN SEVERITY QUANTIFIED, PAIN PRESENT: ICD-10-PCS | Mod: CPTII,S$GLB,, | Performed by: NURSE PRACTITIONER

## 2023-01-23 PROCEDURE — 1101F PT FALLS ASSESS-DOCD LE1/YR: CPT | Mod: CPTII,S$GLB,, | Performed by: NURSE PRACTITIONER

## 2023-01-23 NOTE — PATIENT INSTRUCTIONS
"Stimulus control -- Stimulus control therapy is based on the idea that some people with insomnia have learned to associate the bedroom with staying awake rather than sleeping.  ?You should spend no more than 20 minutes lying in bed trying to fall asleep.  ?If you cannot fall asleep within 20 minutes, get up, go to another room and read or find another relaxing activity until you feel sleepy again. Activities such as eating, balancing your checkbook, doing housework, watching TV, or studying for a test, which "reward" you for staying awake, should be avoided.  ?When you start to feel sleepy, you can return to bed. If you cannot fall asleep in another 20 minutes, repeat the process.  ?Set an alarm clock and get up at the same time every day, including weekends.  ?Do not take a nap during the day.  You may not sleep much on the first night. However, sleep is more likely on succeeding nights because sleepiness is increased and naps are not allowed.       "

## 2023-01-24 ENCOUNTER — TELEPHONE (OUTPATIENT)
Dept: GASTROENTEROLOGY | Facility: CLINIC | Age: 73
End: 2023-01-24
Payer: COMMERCIAL

## 2023-01-26 ENCOUNTER — LAB VISIT (OUTPATIENT)
Dept: LAB | Facility: HOSPITAL | Age: 73
End: 2023-01-26
Attending: PSYCHIATRY & NEUROLOGY
Payer: COMMERCIAL

## 2023-01-26 DIAGNOSIS — C61 PROSTATE CANCER: ICD-10-CM

## 2023-01-26 PROCEDURE — 84153 ASSAY OF PSA TOTAL: CPT | Performed by: PSYCHIATRY & NEUROLOGY

## 2023-01-26 PROCEDURE — 36415 COLL VENOUS BLD VENIPUNCTURE: CPT | Performed by: PSYCHIATRY & NEUROLOGY

## 2023-01-27 LAB — COMPLEXED PSA SERPL-MCNC: <0.01 NG/ML (ref 0–4)

## 2023-01-30 ENCOUNTER — HOSPITAL ENCOUNTER (EMERGENCY)
Facility: HOSPITAL | Age: 73
Discharge: HOME | End: 2023-01-30
Attending: EMERGENCY MEDICINE
Payer: COMMERCIAL

## 2023-01-30 VITALS
SYSTOLIC BLOOD PRESSURE: 150 MMHG | RESPIRATION RATE: 18 BRPM | TEMPERATURE: 99 F | HEART RATE: 63 BPM | OXYGEN SATURATION: 98 % | DIASTOLIC BLOOD PRESSURE: 80 MMHG

## 2023-01-30 DIAGNOSIS — R42 DIZZY: Primary | ICD-10-CM

## 2023-01-30 LAB
ALBUMIN SERPL BCP-MCNC: 4 G/DL (ref 3.5–5.2)
ALP SERPL-CCNC: 84 U/L (ref 55–135)
ALT SERPL W/O P-5'-P-CCNC: 16 U/L (ref 10–44)
ANION GAP SERPL CALC-SCNC: 9 MMOL/L (ref 8–16)
AST SERPL-CCNC: 24 U/L (ref 10–40)
BASOPHILS # BLD AUTO: 0.01 K/UL (ref 0–0.2)
BASOPHILS NFR BLD: 0.3 % (ref 0–1.9)
BILIRUB SERPL-MCNC: 0.5 MG/DL (ref 0.1–1)
BILIRUB UR QL STRIP: NEGATIVE
BNP SERPL-MCNC: 24 PG/ML (ref 0–99)
BUN SERPL-MCNC: 16 MG/DL (ref 8–23)
CALCIUM SERPL-MCNC: 9.9 MG/DL (ref 8.7–10.5)
CHLORIDE SERPL-SCNC: 104 MMOL/L (ref 95–110)
CK SERPL-CCNC: 310 U/L (ref 20–200)
CLARITY UR: CLEAR
CO2 SERPL-SCNC: 30 MMOL/L (ref 23–29)
COLOR UR: YELLOW
CREAT SERPL-MCNC: 1.5 MG/DL (ref 0.5–1.4)
DIFFERENTIAL METHOD: ABNORMAL
EOSINOPHIL # BLD AUTO: 0.1 K/UL (ref 0–0.5)
EOSINOPHIL NFR BLD: 3.8 % (ref 0–8)
ERYTHROCYTE [DISTWIDTH] IN BLOOD BY AUTOMATED COUNT: 13.2 % (ref 11.5–14.5)
EST. GFR  (NO RACE VARIABLE): 50 ML/MIN/1.73 M^2
GLUCOSE SERPL-MCNC: 81 MG/DL (ref 70–110)
GLUCOSE UR QL STRIP: NEGATIVE
HCT VFR BLD AUTO: 44.1 % (ref 40–54)
HGB BLD-MCNC: 14.8 G/DL (ref 14–18)
HGB UR QL STRIP: NEGATIVE
IMM GRANULOCYTES # BLD AUTO: 0 K/UL (ref 0–0.04)
IMM GRANULOCYTES NFR BLD AUTO: 0 % (ref 0–0.5)
KETONES UR QL STRIP: NEGATIVE
LEUKOCYTE ESTERASE UR QL STRIP: NEGATIVE
LYMPHOCYTES # BLD AUTO: 0.6 K/UL (ref 1–4.8)
LYMPHOCYTES NFR BLD: 19.6 % (ref 18–48)
MCH RBC QN AUTO: 29.7 PG (ref 27–31)
MCHC RBC AUTO-ENTMCNC: 33.6 G/DL (ref 32–36)
MCV RBC AUTO: 88 FL (ref 82–98)
MONOCYTES # BLD AUTO: 0.2 K/UL (ref 0.3–1)
MONOCYTES NFR BLD: 7.6 % (ref 4–15)
NEUTROPHILS # BLD AUTO: 2.2 K/UL (ref 1.8–7.7)
NEUTROPHILS NFR BLD: 68.7 % (ref 38–73)
NITRITE UR QL STRIP: NEGATIVE
NRBC BLD-RTO: 0 /100 WBC
PH UR STRIP: 8 [PH] (ref 5–8)
PLATELET # BLD AUTO: 162 K/UL (ref 150–450)
PMV BLD AUTO: 10 FL (ref 9.2–12.9)
POTASSIUM SERPL-SCNC: 3.9 MMOL/L (ref 3.5–5.1)
PROT SERPL-MCNC: 7.9 G/DL (ref 6–8.4)
PROT UR QL STRIP: NEGATIVE
RBC # BLD AUTO: 4.99 M/UL (ref 4.6–6.2)
SODIUM SERPL-SCNC: 143 MMOL/L (ref 136–145)
SP GR UR STRIP: 1.01 (ref 1–1.03)
TROPONIN I SERPL DL<=0.01 NG/ML-MCNC: <0.006 NG/ML (ref 0–0.03)
URN SPEC COLLECT METH UR: NORMAL
UROBILINOGEN UR STRIP-ACNC: 1 EU/DL
WBC # BLD AUTO: 3.17 K/UL (ref 3.9–12.7)

## 2023-01-30 PROCEDURE — 80053 COMPREHEN METABOLIC PANEL: CPT | Performed by: EMERGENCY MEDICINE

## 2023-01-30 PROCEDURE — 81003 URINALYSIS AUTO W/O SCOPE: CPT | Performed by: EMERGENCY MEDICINE

## 2023-01-30 PROCEDURE — 25000003 PHARM REV CODE 250: Performed by: EMERGENCY MEDICINE

## 2023-01-30 PROCEDURE — 83880 ASSAY OF NATRIURETIC PEPTIDE: CPT | Performed by: EMERGENCY MEDICINE

## 2023-01-30 PROCEDURE — 93005 ELECTROCARDIOGRAM TRACING: CPT

## 2023-01-30 PROCEDURE — 84484 ASSAY OF TROPONIN QUANT: CPT | Performed by: EMERGENCY MEDICINE

## 2023-01-30 PROCEDURE — 93010 ELECTROCARDIOGRAM REPORT: CPT | Mod: ,,, | Performed by: STUDENT IN AN ORGANIZED HEALTH CARE EDUCATION/TRAINING PROGRAM

## 2023-01-30 PROCEDURE — 82550 ASSAY OF CK (CPK): CPT | Performed by: EMERGENCY MEDICINE

## 2023-01-30 PROCEDURE — 93010 EKG 12-LEAD: ICD-10-PCS | Mod: ,,, | Performed by: STUDENT IN AN ORGANIZED HEALTH CARE EDUCATION/TRAINING PROGRAM

## 2023-01-30 PROCEDURE — 96360 HYDRATION IV INFUSION INIT: CPT

## 2023-01-30 PROCEDURE — 99285 EMERGENCY DEPT VISIT HI MDM: CPT | Mod: 25

## 2023-01-30 PROCEDURE — 85025 COMPLETE CBC W/AUTO DIFF WBC: CPT | Performed by: EMERGENCY MEDICINE

## 2023-01-30 RX ADMIN — SODIUM CHLORIDE 1000 ML: 9 INJECTION, SOLUTION INTRAVENOUS at 10:01

## 2023-01-30 NOTE — ED PROVIDER NOTES
SCRIBE #1 NOTE: I, Carolynn Raman, federico scribing for, and in the presence of, Avila Mcgowan MD. I have scribed the entire note.       History     Chief Complaint   Patient presents with    Dizziness     Pt brought in by EMS for dizziness since 7 am; issues resolved ion arrival to ED     Review of patient's allergies indicates:  No Known Allergies      History of Present Illness     HPI    1/30/2023, 8:48 AM  History obtained from the patient      History of Present Illness: Irais Mcneil is a 72 y.o. male patient with a PMHx of HTN, HLD, HA, stage 2 renal disease, s/p back surgery, who presents to the Emergency Department for evaluation of dizziness which onset this morning around 7 AM. Patient was brought in to the ED by EMS and issues resolved on arrival. Symptoms are episodic and moderate in severity. No mitigating or exacerbating factors reported. No associated sxs reported. Patient denies any fever, chills, CP, SOB, numbness, weakness, HA, syncope, and all other sxs at this time. No prior Tx reported. No further complaints or concerns at this time.       Arrival mode: EMS    PCP: Sherwin Richardson DO        Past Medical History:  Past Medical History:   Diagnosis Date    Back pain     Elevated PSA     Headache(784.0)     Hyperlipidemia     Hypertension     Other and unspecified hyperlipidemia     Renal disease     Stage II       Past Surgical History:  Past Surgical History:   Procedure Laterality Date    BACK SURGERY      CATARACT EXTRACTION W/  INTRAOCULAR LENS IMPLANT Left 10/06/2008    yag 7/10/13    CATARACT EXTRACTION W/  INTRAOCULAR LENS IMPLANT Right 08/18/2008    yag 7/10/13    COLONOSCOPY N/A 7/11/2019    Procedure: COLONOSCOPY;  Surgeon: Rosangela Barrera MD;  Location: [facility];  Service: Endoscopy;  Laterality: N/A;    ESOPHAGOGASTRODUODENOSCOPY N/A 11/23/2020    Procedure: EGD (ESOPHAGOGASTRODUODENOSCOPY);  Surgeon: Dev Lane MD;  Location: [facility];  Service:  Gastroenterology;  Laterality: N/A;    EYE SURGERY      bilat cataract    SHOULDER ARTHROSCOPY  2/8/12    rt shoulder    TRANSFORAMINAL EPIDURAL INJECTION OF STEROID Right 2/20/2019    Procedure: INJECTION, STEROID, EPIDURAL, TRANSFORAMINAL APPROACH;  Surgeon: Felix Person MD;  Location: [facility];  Service: Pain Management;  Laterality: Right;  RIGHT TF [place] L3-4 and L5-S1    CONSENT NEEDED    TRANSRECTAL BIOPSY OF PROSTATE WITH ULTRASOUND GUIDANCE N/A 5/28/2018    Procedure: TRANSRECTAL ULTRASOUND GUIDED PROSTATE BIOPSY;  Surgeon: Jeramie Hill MD;  Location: [facility];  Service: Urology;  Laterality: N/A;         Family History:  Family History   Problem Relation Age of Onset    Stroke Sister     Diabetes Sister     Hypertension Sister     Stroke Brother     Hypertension Brother     Hypertension Mother        Social History:  Social History     Tobacco Use    Smoking status: Never    Smokeless tobacco: Never   Substance and Sexual Activity    Alcohol use: No    Drug use: No    Sexual activity: Not on file        Review of Systems     Review of Systems   Constitutional:  Negative for chills and fever.   HENT:  Negative for sore throat.    Respiratory:  Negative for shortness of breath.    Cardiovascular:  Negative for chest pain.   Gastrointestinal:  Negative for diarrhea, nausea and vomiting.   Genitourinary:  Negative for dysuria.   Musculoskeletal:  Negative for back pain.   Skin:  Negative for rash.   Neurological:  Positive for dizziness. Negative for syncope, weakness, numbness and headaches.   Hematological:  Does not bruise/bleed easily.   All other systems reviewed and are negative.     Physical Exam     Initial Vitals   BP Pulse Resp Temp SpO2   01/30/23 0837 01/30/23 0837 01/30/23 0837 01/30/23 1203 01/30/23 0837   (!) 154/75 69 16 98.6 °F (37 °C) 98 %      MAP       --                 Physical Exam  Nursing Notes and Vital Signs Reviewed.  Constitutional: Patient is in no acute distress.  Well-developed and well-nourished.  Head: Atraumatic. Normocephalic.  Eyes: PERRL. EOM intact. Conjunctivae are not pale. No scleral icterus.  ENT: Mucous membranes are moist.    Neck: Supple. Full ROM.   Cardiovascular: Regular rate. Regular rhythm. No murmurs, rubs, or gallops. Distal pulses are 2+ and symmetric.  Pulmonary/Chest: No respiratory distress. Clear to auscultation bilaterally. No wheezing or rales.  Abdominal: Soft and non-distended.  There is no tenderness.  No rebound, guarding, or rigidity.   Musculoskeletal: Moves all extremities. No obvious deformities. No edema.   Skin: Warm and dry.  Neurological:  Alert, awake, and appropriate.  Normal speech.  No acute focal neurological deficits are appreciated.  Psychiatric: Normal affect. Good eye contact. Appropriate in content.     ED Course   Procedures  ED Vital Signs:  Vitals:    01/30/23 0837 01/30/23 0942 01/30/23 0943 01/30/23 0945   BP: (!) 154/75 (!) 152/76 (!) 158/82 (!) 145/81   Pulse: 69 63 (!) 56 64   Resp: 16 18 16 18   Temp:       TempSrc:       SpO2: 98% 98% 98% 98%    01/30/23 1203   BP: (!) 150/80   Pulse: 63   Resp: 18   Temp: 98.6 °F (37 °C)   TempSrc: Oral   SpO2: 98%       Abnormal Lab Results:  Labs Reviewed   CBC W/ AUTO DIFFERENTIAL - Abnormal; Notable for the following components:       Result Value    WBC 3.17 (*)     Lymph # 0.6 (*)     Mono # 0.2 (*)     All other components within normal limits   COMPREHENSIVE METABOLIC PANEL - Abnormal; Notable for the following components:    CO2 30 (*)     Creatinine 1.5 (*)     eGFR 50 (*)     All other components within normal limits   CK - Abnormal; Notable for the following components:     (*)     All other components within normal limits   URINALYSIS, REFLEX TO URINE CULTURE    Narrative:     Specimen Source->Urine   B-TYPE NATRIURETIC PEPTIDE   TROPONIN I        All Lab Results:  Results for orders placed or performed during the hospital encounter of 01/30/23   CBC Auto  Differential   Result Value Ref Range    WBC 3.17 (L) 3.90 - 12.70 K/uL    RBC 4.99 4.60 - 6.20 M/uL    Hemoglobin 14.8 14.0 - 18.0 g/dL    Hematocrit 44.1 40.0 - 54.0 %    MCV 88 82 - 98 fL    MCH 29.7 27.0 - 31.0 pg    MCHC 33.6 32.0 - 36.0 g/dL    RDW 13.2 11.5 - 14.5 %    Platelets 162 150 - 450 K/uL    MPV 10.0 9.2 - 12.9 fL    Immature Granulocytes 0.0 0.0 - 0.5 %    Gran # (ANC) 2.2 1.8 - 7.7 K/uL    Immature Grans (Abs) 0.00 0.00 - 0.04 K/uL    Lymph # 0.6 (L) 1.0 - 4.8 K/uL    Mono # 0.2 (L) 0.3 - 1.0 K/uL    Eos # 0.1 0.0 - 0.5 K/uL    Baso # 0.01 0.00 - 0.20 K/uL    nRBC 0 0 /100 WBC    Gran % 68.7 38.0 - 73.0 %    Lymph % 19.6 18.0 - 48.0 %    Mono % 7.6 4.0 - 15.0 %    Eosinophil % 3.8 0.0 - 8.0 %    Basophil % 0.3 0.0 - 1.9 %    Differential Method Automated    Comprehensive Metabolic Panel   Result Value Ref Range    Sodium 143 136 - 145 mmol/L    Potassium 3.9 3.5 - 5.1 mmol/L    Chloride 104 95 - 110 mmol/L    CO2 30 (H) 23 - 29 mmol/L    Glucose 81 70 - 110 mg/dL    BUN 16 8 - 23 mg/dL    Creatinine 1.5 (H) 0.5 - 1.4 mg/dL    Calcium 9.9 8.7 - 10.5 mg/dL    Total Protein 7.9 6.0 - 8.4 g/dL    Albumin 4.0 3.5 - 5.2 g/dL    Total Bilirubin 0.5 0.1 - 1.0 mg/dL    Alkaline Phosphatase 84 55 - 135 U/L    AST 24 10 - 40 U/L    ALT 16 10 - 44 U/L    Anion Gap 9 8 - 16 mmol/L    eGFR 50 (A) >60 mL/min/1.73 m^2   Urinalysis, Reflex to Urine Culture Urine, Clean Catch    Specimen: Urine   Result Value Ref Range    Specimen UA Urine, Clean Catch     Color, UA Yellow Yellow, Straw, Nataly    Appearance, UA Clear Clear    pH, UA 8.0 5.0 - 8.0    Specific Gravity, UA 1.015 1.005 - 1.030    Protein, UA Negative Negative    Glucose, UA Negative Negative    Ketones, UA Negative Negative    Bilirubin (UA) Negative Negative    Occult Blood UA Negative Negative    Nitrite, UA Negative Negative    Urobilinogen, UA 1.0 <2.0 EU/dL    Leukocytes, UA Negative Negative   BNP   Result Value Ref Range    BNP 24 0 - 99 pg/mL    CK   Result Value Ref Range     (H) 20 - 200 U/L   Troponin I   Result Value Ref Range    Troponin I <0.006 0.000 - 0.026 ng/mL       Imaging Results:  Imaging Results              CT Head Without Contrast (Final result)  Result time 01/30/23 09:29:46      Final result by Abhinav Lott MD (01/30/23 09:29:46)                   Impression:      Chronic microvascular ischemic changes.      Electronically signed by: Abhinav Lott MD  Date:    01/30/2023  Time:    09:29               Narrative:    EXAMINATION:  CT HEAD WITHOUT CONTRAST    CLINICAL HISTORY:  Syncope, recurrent;    TECHNIQUE:  Low dose axial CT images obtained throughout the head without intravenous contrast. Sagittal and coronal reconstructions were performed.  All CT scans at this facility use dose modulation, iterative reconstruction and/or weight based dosing when appropriate to reduce radiation dose to as low as reasonably achievable.    COMPARISON:  06/16/2022    FINDINGS:  Intracranial compartment:    The brain parenchyma demonstrates areas of decreased attenuation with mild to moderate periventricular white matter consistent with chronic microvascular ischemic changes..  No parenchymal mass, hemorrhage, edema or major vascular distribution infarct.  Vascular calcifications are noted.    Mild prominence of the sulci and ventricles are consistent with age-related involutional changes.    No extra-axial blood or fluid collections.    Skull/extracranial contents (limited evaluation): No fracture. Mastoid air cells and paranasal sinuses are essentially clear.                                       X-Ray Chest AP Portable (Final result)  Result time 01/30/23 09:16:24      Final result by BART Cali Sr., MD (01/30/23 09:16:24)                   Impression:      Normal study.      Electronically signed by: Yonathan Cali MD  Date:    01/30/2023  Time:    09:16               Narrative:    EXAMINATION:  XR CHEST AP PORTABLE    CLINICAL  HISTORY:  dizzy;    COMPARISON:  06/16/2022    FINDINGS:  The size of the heart is normal. The lungs are clear. There is no pneumothorax.  The costophrenic angles are sharp.                                       The EKG was ordered, reviewed, and independently interpreted by the ED provider.  Interpretation time: 9:30  Rate: 58 BPM  Rhythm: sinus bradycardia  Interpretation: Minimal voltage criteria for LVH, may be normal variant. No STEMI.           The Emergency Provider reviewed the vital signs and test results, which are outlined above.     ED Discussion       11:00 AM: Reassessed pt at this time. Discussed with pt all pertinent ED information and results. Discussed pt dx and plan of tx. Gave pt all f/u and return to the ED instructions. All questions and concerns were addressed at this time. Pt expresses understanding of information and instructions, and is comfortable with plan to discharge. Pt is stable for discharge.    I discussed with patient and/or family/caretaker that evaluation in the ED does not suggest any emergent or life threatening medical conditions requiring immediate intervention beyond what was provided in the ED, and I believe patient is safe for discharge.  Regardless, an unremarkable evaluation in the ED does not preclude the development or presence of a serious of life threatening condition. As such, patient was instructed to return immediately for any worsening or change in current symptoms.       Medical Decision Making:   Initial Assessment:   Felt dizzy while at work.  Rested, and felt better  Differential Diagnosis:   Dizziness, anemia, dehydration  Clinical Tests:   Lab Tests: Ordered and Reviewed  Radiological Study: Ordered and Reviewed  Medical Tests: Ordered and Reviewed  ED Management:  Feels dizzy upon standing, but vitals wnl.  Creatinine 1.6 slightly elevated above normal.  Patient feels better after fluids. W ill discharge clayton.           ED Medication(s):  Medications    sodium chloride 0.9% bolus 1,000 mL 1,000 mL (0 mLs Intravenous Stopped 1/30/23 1144)       Discharge Medication List as of 1/30/2023 11:02 AM           Follow-up Information       Sherwin Richardson DO.    Specialty: Neurology  Contact information:  [address]  [place] [address]  [phone]                                 Scribe Attestation:   Scribe #1: I performed the above scribed service and the documentation accurately describes the services I performed. I attest to the accuracy of the note.     Attending:   Physician Attestation Statement for Scribe #1: I, Avila Mcgowan MD, personally performed the services described in this documentation, as scribed by Carolynn Raman, in my presence, and it is both accurate and complete.           Clinical Impression       ICD-10-CM ICD-9-CM   1. Dizzy  R42 780.4       Disposition:   Disposition: Discharged  Condition: Stable       Avila Mcgowan MD  01/30/23 1210

## 2023-01-30 NOTE — ED NOTES
Unable to place patient on telemetry monitoring, d/t non-monitored bed. MD and charge nurse aware.

## 2023-01-30 NOTE — Clinical Note
"Maria Guadalupebii "Martinaaleks" Tarun was seen and treated in our emergency department on 1/30/2023.  He may return to work on 02/02/2023.       If you have any questions or concerns, please don't hesitate to call.      Izzy LUX"

## 2023-03-01 ENCOUNTER — OFFICE VISIT (OUTPATIENT)
Dept: GASTROENTEROLOGY | Facility: CLINIC | Age: 73
End: 2023-03-01
Payer: COMMERCIAL

## 2023-03-01 VITALS
WEIGHT: 150.13 LBS | SYSTOLIC BLOOD PRESSURE: 160 MMHG | DIASTOLIC BLOOD PRESSURE: 88 MMHG | HEIGHT: 68 IN | BODY MASS INDEX: 22.75 KG/M2 | HEART RATE: 68 BPM

## 2023-03-01 DIAGNOSIS — Z00.00 HEALTH MAINTENANCE EXAMINATION: ICD-10-CM

## 2023-03-01 PROCEDURE — 1101F PT FALLS ASSESS-DOCD LE1/YR: CPT | Mod: CPTII,S$GLB,, | Performed by: INTERNAL MEDICINE

## 2023-03-01 PROCEDURE — 1126F PR PAIN SEVERITY QUANTIFIED, NO PAIN PRESENT: ICD-10-PCS | Mod: CPTII,S$GLB,, | Performed by: INTERNAL MEDICINE

## 2023-03-01 PROCEDURE — 1159F PR MEDICATION LIST DOCUMENTED IN MEDICAL RECORD: ICD-10-PCS | Mod: CPTII,S$GLB,, | Performed by: INTERNAL MEDICINE

## 2023-03-01 PROCEDURE — 99213 OFFICE O/P EST LOW 20 MIN: CPT | Mod: S$GLB,,, | Performed by: INTERNAL MEDICINE

## 2023-03-01 PROCEDURE — 1159F MED LIST DOCD IN RCRD: CPT | Mod: CPTII,S$GLB,, | Performed by: INTERNAL MEDICINE

## 2023-03-01 PROCEDURE — 3288F PR FALLS RISK ASSESSMENT DOCUMENTED: ICD-10-PCS | Mod: CPTII,S$GLB,, | Performed by: INTERNAL MEDICINE

## 2023-03-01 PROCEDURE — 3077F SYST BP >= 140 MM HG: CPT | Mod: CPTII,S$GLB,, | Performed by: INTERNAL MEDICINE

## 2023-03-01 PROCEDURE — 3079F PR MOST RECENT DIASTOLIC BLOOD PRESSURE 80-89 MM HG: ICD-10-PCS | Mod: CPTII,S$GLB,, | Performed by: INTERNAL MEDICINE

## 2023-03-01 PROCEDURE — 3008F BODY MASS INDEX DOCD: CPT | Mod: CPTII,S$GLB,, | Performed by: INTERNAL MEDICINE

## 2023-03-01 PROCEDURE — 1101F PR PT FALLS ASSESS DOC 0-1 FALLS W/OUT INJ PAST YR: ICD-10-PCS | Mod: CPTII,S$GLB,, | Performed by: INTERNAL MEDICINE

## 2023-03-01 PROCEDURE — 3077F PR MOST RECENT SYSTOLIC BLOOD PRESSURE >= 140 MM HG: ICD-10-PCS | Mod: CPTII,S$GLB,, | Performed by: INTERNAL MEDICINE

## 2023-03-01 PROCEDURE — 3008F PR BODY MASS INDEX (BMI) DOCUMENTED: ICD-10-PCS | Mod: CPTII,S$GLB,, | Performed by: INTERNAL MEDICINE

## 2023-03-01 PROCEDURE — 99999 PR PBB SHADOW E&M-EST. PATIENT-LVL IV: ICD-10-PCS | Mod: PBBFAC,,, | Performed by: INTERNAL MEDICINE

## 2023-03-01 PROCEDURE — 99999 PR PBB SHADOW E&M-EST. PATIENT-LVL IV: CPT | Mod: PBBFAC,,, | Performed by: INTERNAL MEDICINE

## 2023-03-01 PROCEDURE — 99213 PR OFFICE/OUTPT VISIT, EST, LEVL III, 20-29 MIN: ICD-10-PCS | Mod: S$GLB,,, | Performed by: INTERNAL MEDICINE

## 2023-03-01 PROCEDURE — 1126F AMNT PAIN NOTED NONE PRSNT: CPT | Mod: CPTII,S$GLB,, | Performed by: INTERNAL MEDICINE

## 2023-03-01 PROCEDURE — 3079F DIAST BP 80-89 MM HG: CPT | Mod: CPTII,S$GLB,, | Performed by: INTERNAL MEDICINE

## 2023-03-01 PROCEDURE — 3288F FALL RISK ASSESSMENT DOCD: CPT | Mod: CPTII,S$GLB,, | Performed by: INTERNAL MEDICINE

## 2023-03-01 NOTE — PROGRESS NOTES
"Clinic Progress Note:  [facility] Progress Note    Reason for Visit:  The encounter diagnosis was Health maintenance examination.    PCP: Sherwin Richardson       HPI:  This is a 72 y.o. male here for follow-up.   Patient states he thought this was just a check up.  Received a telephone message that patient had rectal bleeding in 9/2022.   Patient states he did not have rectal bleeding.   He denies any GI issues today.   Colonoscopy in 2019. He is due for repeat colonoscopy in 2029.     ROS:  As above HPI       Allergies: Reviewed    Home Medications:   Medication List with Changes/Refills   Current Medications    ACETAMINOPHEN (TYLENOL) 500 MG TABLET    Take 500 mg by mouth every 6 (six) hours as needed for Pain.    AMLODIPINE (NORVASC) 10 MG TABLET    Take 1 tablet (10 mg total) by mouth once daily.    ASPIRIN (ECOTRIN) 81 MG EC TABLET    Take by mouth.    ATORVASTATIN (LIPITOR) 20 MG TABLET    Take 1 tablet (20 mg total) by mouth every evening.    CHOLECALCIFEROL, VITAMIN D3, 125 MCG (5,000 UNIT) TAB        DOCUSATE SODIUM (COLACE) 100 MG CAPSULE    Take 100 mg by mouth 2 (two) times a day.    FOLIC ACID (FOLVITE) 1 MG TABLET    TAKE 1 TABLET BY MOUTH EVERY DAY    LIFITEGRAST (XIIDRA OPHT)        MULTIVIT-MINERALS/FOLIC ACID (ADULT ONE DAILY MULTIVITAMIN ORAL)        OMEGA-3-DHA-EPA-DPA-FISH OIL ORAL        PREDNISOLONE ACETATE (PRED FORTE) 1 % DRPS    INSTILL 1 DROP INTO BOTH EYES 4 TIMES A DAY    TADALAFIL (CIALIS) 5 MG TABLET    TAKE ONE TABLET BY MOUTH ONE TIME DAILY at the same time every day    VENLAFAXINE (EFFEXOR-XR) 75 MG 24 HR CAPSULE    Take 1 by mouth daily    XIIDRA 5 % DPET    Place 1 drop into both eyes 2 (two) times daily.         Physical Exam:  Vital Signs:  BP (!) 160/88   Pulse 68   Ht 5' 8" (1.727 m)   Wt 68.1 kg (150 lb 2.1 oz)   BMI 22.83 kg/m²   Body mass index is 22.83 kg/m².    Physical Exam  Constitutional:       Appearance: Normal appearance.   HENT:      Head: " Normocephalic.   Eyes:      Conjunctiva/sclera: Conjunctivae normal.   Abdominal:      General: There is no distension.      Palpations: Abdomen is soft.      Tenderness: There is no abdominal tenderness. There is no guarding.   Neurological:      Mental Status: He is alert.        Labs: Pertinent labs reviewed.          Assessment:  Problem List Items Addressed This Visit          Other    Health maintenance examination    Current Assessment & Plan     Plan:   -Repeat colonoscopy in 2019.   -Please return to GI clinic if he develops any symptoms.           Health maintenance examination                No follow-ups on file.    Order summary:  No orders of the defined types were placed in this encounter.      Thank you so much for allowing me to participate in the care of Maria Guadalupehawa Lane MD  Gastroenterology and Hepatology  [facility]

## 2023-06-05 PROBLEM — Z00.00 HEALTH MAINTENANCE EXAMINATION: Status: RESOLVED | Noted: 2019-07-11 | Resolved: 2023-06-05

## 2025-06-22 NOTE — PATIENT INSTRUCTIONS
Continue current medications. Can take zyrtec D or claritin D prior to your plane flight.   Continue exercise regimen. Patient has been encouraged to exercise a minimum of 30 minutes daily, 5 times per week.   Patient advised to consume a low salt, heart healthy diet. Mediterranean diet recommended.  Return to clinic in one year after bloodwork.          177.8

## (undated) DEVICE — SOL 9P NACL IRR PIC IL

## (undated) DEVICE — SEE MEDLINE ITEM 152487

## (undated) DEVICE — DRESSING LEUKOPLAST FLEX 1X3IN

## (undated) DEVICE — SCRUB 10% POVIDONE IODINE 4OZ

## (undated) DEVICE — LUBRICANT SURGILUBE 2 OZ

## (undated) DEVICE — SLEEVE SCD EXPRESS CALF MEDIUM

## (undated) DEVICE — GAUZE SPONGE BULKEE 6X6.75IN

## (undated) DEVICE — SYR 50ML CATH TIP

## (undated) DEVICE — GUN BIOPSY 18GA MONOPLY